# Patient Record
Sex: MALE | Race: BLACK OR AFRICAN AMERICAN | NOT HISPANIC OR LATINO | Employment: OTHER | URBAN - METROPOLITAN AREA
[De-identification: names, ages, dates, MRNs, and addresses within clinical notes are randomized per-mention and may not be internally consistent; named-entity substitution may affect disease eponyms.]

---

## 2017-08-22 ENCOUNTER — OFFICE VISIT (OUTPATIENT)
Dept: INTERNAL MEDICINE | Facility: CLINIC | Age: 42
End: 2017-08-22
Payer: COMMERCIAL

## 2017-08-22 ENCOUNTER — LAB VISIT (OUTPATIENT)
Dept: LAB | Facility: HOSPITAL | Age: 42
End: 2017-08-22
Attending: INTERNAL MEDICINE
Payer: COMMERCIAL

## 2017-08-22 VITALS
HEIGHT: 74 IN | WEIGHT: 216.06 LBS | HEART RATE: 60 BPM | RESPIRATION RATE: 16 BRPM | TEMPERATURE: 98 F | BODY MASS INDEX: 27.73 KG/M2 | DIASTOLIC BLOOD PRESSURE: 80 MMHG | SYSTOLIC BLOOD PRESSURE: 116 MMHG

## 2017-08-22 DIAGNOSIS — M54.6 ACUTE RIGHT-SIDED THORACIC BACK PAIN: ICD-10-CM

## 2017-08-22 LAB
ALBUMIN SERPL BCP-MCNC: 3.9 G/DL
ALP SERPL-CCNC: 89 U/L
ALT SERPL W/O P-5'-P-CCNC: 17 U/L
ANION GAP SERPL CALC-SCNC: 9 MMOL/L
AST SERPL-CCNC: 19 U/L
BASOPHILS # BLD AUTO: 0.02 K/UL
BASOPHILS NFR BLD: 0.4 %
BILIRUB SERPL-MCNC: 0.4 MG/DL
BUN SERPL-MCNC: 16 MG/DL
CALCIUM SERPL-MCNC: 9.7 MG/DL
CHLORIDE SERPL-SCNC: 107 MMOL/L
CHOLEST/HDLC SERPL: 3.3 {RATIO}
CK SERPL-CCNC: 172 U/L
CO2 SERPL-SCNC: 25 MMOL/L
CREAT SERPL-MCNC: 1.5 MG/DL
D DIMER PPP IA.FEU-MCNC: <0.19 MG/L FEU
DIFFERENTIAL METHOD: ABNORMAL
EOSINOPHIL # BLD AUTO: 0.1 K/UL
EOSINOPHIL NFR BLD: 1.6 %
ERYTHROCYTE [DISTWIDTH] IN BLOOD BY AUTOMATED COUNT: 13.8 %
EST. GFR  (AFRICAN AMERICAN): >60 ML/MIN/1.73 M^2
EST. GFR  (NON AFRICAN AMERICAN): 56.6 ML/MIN/1.73 M^2
GLUCOSE SERPL-MCNC: 84 MG/DL
HCT VFR BLD AUTO: 41.8 %
HDL/CHOLESTEROL RATIO: 30.3 %
HDLC SERPL-MCNC: 152 MG/DL
HDLC SERPL-MCNC: 46 MG/DL
HGB BLD-MCNC: 15.2 G/DL
LDLC SERPL CALC-MCNC: 94.8 MG/DL
LYMPHOCYTES # BLD AUTO: 2.1 K/UL
LYMPHOCYTES NFR BLD: 40.8 %
MCH RBC QN AUTO: 29.4 PG
MCHC RBC AUTO-ENTMCNC: 36.4 G/DL
MCV RBC AUTO: 81 FL
MONOCYTES # BLD AUTO: 0.5 K/UL
MONOCYTES NFR BLD: 9.4 %
NEUTROPHILS # BLD AUTO: 2.4 K/UL
NEUTROPHILS NFR BLD: 47.2 %
NONHDLC SERPL-MCNC: 106 MG/DL
PLATELET # BLD AUTO: 234 K/UL
PMV BLD AUTO: 9.5 FL
POTASSIUM SERPL-SCNC: 4.5 MMOL/L
PROT SERPL-MCNC: 8.1 G/DL
RBC # BLD AUTO: 5.17 M/UL
SODIUM SERPL-SCNC: 141 MMOL/L
TRIGL SERPL-MCNC: 56 MG/DL
TROPONIN I SERPL DL<=0.01 NG/ML-MCNC: <0.006 NG/ML
TSH SERPL DL<=0.005 MIU/L-ACNC: 1.28 UIU/ML
WBC # BLD AUTO: 5.02 K/UL

## 2017-08-22 PROCEDURE — 85025 COMPLETE CBC W/AUTO DIFF WBC: CPT

## 2017-08-22 PROCEDURE — 99204 OFFICE O/P NEW MOD 45 MIN: CPT | Mod: S$GLB,,, | Performed by: INTERNAL MEDICINE

## 2017-08-22 PROCEDURE — 36415 COLL VENOUS BLD VENIPUNCTURE: CPT | Mod: PO

## 2017-08-22 PROCEDURE — 80053 COMPREHEN METABOLIC PANEL: CPT

## 2017-08-22 PROCEDURE — 85379 FIBRIN DEGRADATION QUANT: CPT

## 2017-08-22 PROCEDURE — 82550 ASSAY OF CK (CPK): CPT

## 2017-08-22 PROCEDURE — 80061 LIPID PANEL: CPT

## 2017-08-22 PROCEDURE — 84443 ASSAY THYROID STIM HORMONE: CPT

## 2017-08-22 PROCEDURE — 3008F BODY MASS INDEX DOCD: CPT | Mod: S$GLB,,, | Performed by: INTERNAL MEDICINE

## 2017-08-22 PROCEDURE — 99999 PR PBB SHADOW E&M-EST. PATIENT-LVL III: CPT | Mod: PBBFAC,,, | Performed by: INTERNAL MEDICINE

## 2017-08-22 PROCEDURE — 84484 ASSAY OF TROPONIN QUANT: CPT

## 2017-08-22 RX ORDER — HYDROCODONE BITARTRATE AND ACETAMINOPHEN 7.5; 325 MG/1; MG/1
1 TABLET ORAL 3 TIMES DAILY
Refills: 0 | COMMUNITY
Start: 2017-07-26 | End: 2017-08-30 | Stop reason: SDUPTHER

## 2017-08-22 RX ORDER — TIZANIDINE 4 MG/1
4 TABLET ORAL NIGHTLY
Refills: 0 | COMMUNITY
Start: 2017-07-25 | End: 2017-11-10

## 2017-08-22 RX ORDER — IBUPROFEN 800 MG/1
800 TABLET ORAL 3 TIMES DAILY
Refills: 0 | COMMUNITY
Start: 2017-07-25 | End: 2017-08-30

## 2017-08-22 NOTE — PROGRESS NOTES
Subjective:       Patient ID: Hardik Jimenez is a 41 y.o. male.    Chief Complaint: Back Pain (on right side of back going toward neck.  pain at 2 now, was worse)  HISTORY OF PRESENT ILLNESS:  A 41-year-old black male patient comes in with pain   on his right middle back, which he said occasionally will radiate up to his   neck and head, lasting less than a half hour, not associated with any activity.    The patient has no history of injury.  He does not do manual work that would   account for this.  The patient has not really been evaluated in the past, other   than through the Emergency Room.  He is currently working at [employer] and getting   ready to move.    FAMILY HISTORY:  Mother with diabetes.  Father and brother are healthy.    SOCIAL HISTORY:  Occasionally smokes and drinks, but that is situational and   infrequent.  The patient has no history of other illness.  His asthma visits   were for minor problems.  The patient had lab on the day of the visit, showing   negative.  He had his blood work done, nonfasting, but his lipids are normal,   CBC is normal.  His creatinine is 1.5 and it has been around that in the past.    .  D-dimer is negative.  Troponin is negative.    PHYSICAL EXAMINATION:  VITAL SIGNS:  Normal.  SKIN:  Fair and healthy.  HEENT:  Clear.  NECK:  Shows no stiffness, adenopathy or thyroid enlargement.  CHEST:  Clear.  HEART:  There is no murmur or gallop.  ABDOMEN:  Nontender; without any organomegaly, mass, fullness.  GENITOURINARY:  Scrotal exam shows normal testicles.  No nodules.  No hernia.    No inguinal adenopathy.  BACK:  He seems to be tender in the right trapezius area, but only on very deep   pressure, not really with movement.  The rest of the exam is normal.    IMPRESSION:  Back pain, very likely due to tightness in his right trapezius.  I   told him to try magnesium one to two tablets a day to see if that will relieve   things.  I did not have his lab at that point, but  that supports the idea of   this.  If this continues, I will see him again soon.      JDC/HN  dd: 08/25/2017 15:18:31 (CDT)  td: 08/26/2017 04:19:01 (CDT)  Doc ID   #[id number]  Job ID #[id number]    CC:     [facility] [id number]  [facility]  Review of Systems   Constitutional: Negative for activity change, appetite change, fatigue and unexpected weight change.   HENT: Negative for dental problem, hearing loss, mouth sores, postnasal drip and sinus pressure.    Eyes: Negative for discharge and visual disturbance.   Respiratory: Negative for cough and shortness of breath.    Cardiovascular: Negative for chest pain and palpitations.   Gastrointestinal: Negative for abdominal pain, blood in stool, constipation, diarrhea and nausea.   Genitourinary: Negative for dysuria, hematuria and testicular pain.   Musculoskeletal: Positive for back pain and neck pain. Negative for arthralgias and joint swelling.   Skin: Negative for rash.   Neurological: Positive for headaches. Negative for weakness.   Psychiatric/Behavioral: Negative for agitation and sleep disturbance.       Objective:      Physical Exam   Constitutional: He is oriented to person, place, and time. He appears well-developed and well-nourished.   HENT:   Head: Normocephalic.   Right Ear: External ear normal.   Left Ear: External ear normal.   Mouth/Throat: Oropharynx is clear and moist.   Eyes: EOM are normal. Pupils are equal, round, and reactive to light. Right eye exhibits no discharge.   Neck: Normal range of motion. No JVD present. No tracheal deviation present. No thyromegaly present.   Cardiovascular: Normal rate, regular rhythm, normal heart sounds and intact distal pulses.  Exam reveals no gallop.    No murmur heard.  Pulmonary/Chest: Effort normal and breath sounds normal. He has no wheezes. He has no rales.   Abdominal: Soft. Bowel sounds are normal. He exhibits no mass. There is no tenderness.   Genitourinary: Prostate normal and penis normal. Rectal exam shows guaiac  negative stool.   Musculoskeletal: Normal range of motion. He exhibits no edema.   Lymphadenopathy:     He has no cervical adenopathy.   Neurological: He is oriented to person, place, and time. He displays normal reflexes. No cranial nerve deficit. Coordination normal.   Skin: Skin is warm. No rash noted. No pallor.   Psychiatric: He has a normal mood and affect.       Assessment:       1. Acute right-sided thoracic back pain        Plan:

## 2017-08-29 ENCOUNTER — TELEPHONE (OUTPATIENT)
Dept: INTERNAL MEDICINE | Facility: CLINIC | Age: 42
End: 2017-08-29

## 2017-08-29 NOTE — TELEPHONE ENCOUNTER
I tried to book him today w/ dr evangelista but he couldn't make it.  Scheduled appt with dr echevarria for tomorrow since dr evangelista till next Tuesday.

## 2017-08-29 NOTE — TELEPHONE ENCOUNTER
----- Message from Agustín Curtis MD sent at 8/26/2017 11:36 AM CDT -----  Lab is all normal, and how is he doing?

## 2017-08-29 NOTE — TELEPHONE ENCOUNTER
I told him your comments on results. He is on magnesium as you suggested.    Pain is still happening off and on.    Anything else can be done?    Please advise.  Thanks michelle

## 2017-08-30 ENCOUNTER — HOSPITAL ENCOUNTER (OUTPATIENT)
Dept: RADIOLOGY | Facility: HOSPITAL | Age: 42
Discharge: HOME | End: 2017-08-30
Attending: INTERNAL MEDICINE
Payer: COMMERCIAL

## 2017-08-30 ENCOUNTER — TELEPHONE (OUTPATIENT)
Dept: INTERNAL MEDICINE | Facility: CLINIC | Age: 42
End: 2017-08-30

## 2017-08-30 ENCOUNTER — OFFICE VISIT (OUTPATIENT)
Dept: INTERNAL MEDICINE | Facility: CLINIC | Age: 42
End: 2017-08-30
Payer: COMMERCIAL

## 2017-08-30 VITALS
HEIGHT: 73 IN | HEART RATE: 60 BPM | RESPIRATION RATE: 16 BRPM | WEIGHT: 218.69 LBS | SYSTOLIC BLOOD PRESSURE: 105 MMHG | DIASTOLIC BLOOD PRESSURE: 64 MMHG | BODY MASS INDEX: 28.98 KG/M2 | TEMPERATURE: 98 F

## 2017-08-30 DIAGNOSIS — M62.830 MUSCLE SPASM OF BACK: ICD-10-CM

## 2017-08-30 DIAGNOSIS — S23.9XXA THORACIC BACK SPRAIN, INITIAL ENCOUNTER: ICD-10-CM

## 2017-08-30 DIAGNOSIS — S23.9XXA THORACIC BACK SPRAIN, INITIAL ENCOUNTER: Primary | ICD-10-CM

## 2017-08-30 PROCEDURE — 99214 OFFICE O/P EST MOD 30 MIN: CPT | Mod: S$GLB,,, | Performed by: INTERNAL MEDICINE

## 2017-08-30 PROCEDURE — 3008F BODY MASS INDEX DOCD: CPT | Mod: S$GLB,,, | Performed by: INTERNAL MEDICINE

## 2017-08-30 PROCEDURE — 72070 X-RAY EXAM THORAC SPINE 2VWS: CPT | Mod: TC,PO

## 2017-08-30 PROCEDURE — 72070 X-RAY EXAM THORAC SPINE 2VWS: CPT | Mod: 26,,, | Performed by: RADIOLOGY

## 2017-08-30 PROCEDURE — 99999 PR PBB SHADOW E&M-EST. PATIENT-LVL III: CPT | Mod: PBBFAC,,, | Performed by: INTERNAL MEDICINE

## 2017-08-30 RX ORDER — MELOXICAM 15 MG/1
TABLET ORAL
Qty: 30 TABLET | Refills: 1 | Status: SHIPPED | OUTPATIENT
Start: 2017-08-30 | End: 2017-08-30 | Stop reason: SDUPTHER

## 2017-08-30 RX ORDER — MELOXICAM 15 MG/1
TABLET ORAL
Qty: 30 TABLET | Refills: 1 | Status: SHIPPED | OUTPATIENT
Start: 2017-08-30 | End: 2017-11-10

## 2017-08-30 RX ORDER — METHYLPREDNISOLONE 4 MG/1
TABLET ORAL
Qty: 1 PACKAGE | Refills: 0 | Status: SHIPPED | OUTPATIENT
Start: 2017-08-30 | End: 2017-11-10

## 2017-08-30 RX ORDER — METHYLPREDNISOLONE 4 MG/1
TABLET ORAL
Qty: 1 PACKAGE | Refills: 0 | Status: SHIPPED | OUTPATIENT
Start: 2017-08-30 | End: 2017-08-30 | Stop reason: SDUPTHER

## 2017-08-30 RX ORDER — HYDROCODONE BITARTRATE AND ACETAMINOPHEN 7.5; 325 MG/1; MG/1
1 TABLET ORAL EVERY 8 HOURS PRN
Qty: 20 TABLET | Refills: 0 | Status: SHIPPED | OUTPATIENT
Start: 2017-08-30 | End: 2017-11-10

## 2017-08-30 NOTE — TELEPHONE ENCOUNTER
----- Message from Kelvin Pablo DO sent at 8/30/2017  3:03 PM CDT -----  No acute findings on X-ray of the thoracic spine

## 2017-08-30 NOTE — PROGRESS NOTES
Subjective:       Patient ID: Hardik Jimenez is a 41 y.o. male.    Chief Complaint: Back Pain (mid)    HPI   Pt here for f/u regarding right sided thoracic back pain which has been intermittent over the last 2 weeks. It is described as a dull ache at rest and can be sharp with movement. Pt thinks he hurt it carrying luggage. Minimal relief with Advil or magnesium.   Review of Systems   Constitutional: Negative for activity change, appetite change, chills, diaphoresis, fatigue, fever and unexpected weight change.   HENT: Negative for postnasal drip, rhinorrhea, sinus pressure, sneezing, sore throat, trouble swallowing and voice change.    Respiratory: Negative for cough, shortness of breath and wheezing.    Cardiovascular: Negative for chest pain, palpitations and leg swelling.   Gastrointestinal: Negative for abdominal pain, blood in stool, constipation, diarrhea, nausea and vomiting.   Genitourinary: Negative for dysuria.   Musculoskeletal: Positive for back pain and myalgias. Negative for arthralgias.   Skin: Negative for rash and wound.   Allergic/Immunologic: Negative for environmental allergies and food allergies.   Hematological: Negative for adenopathy. Does not bruise/bleed easily.       Objective:      Physical Exam   Constitutional: He is oriented to person, place, and time. He appears well-developed and well-nourished. No distress.   HENT:   Head: Normocephalic and atraumatic.   Eyes: Conjunctivae and EOM are normal. Pupils are equal, round, and reactive to light. Right eye exhibits no discharge. Left eye exhibits no discharge. No scleral icterus.   Neck: Normal range of motion. Neck supple. No JVD present.   Cardiovascular: Normal rate, regular rhythm and normal heart sounds.    No murmur heard.  Pulmonary/Chest: Effort normal and breath sounds normal. No respiratory distress. He has no wheezes. He has no rales.   Musculoskeletal: He exhibits no edema.        Thoracic back: He exhibits tenderness and  pain.        Back:    Lymphadenopathy:     He has no cervical adenopathy.   Neurological: He is alert and oriented to person, place, and time.   Skin: Skin is warm and dry. No rash noted. He is not diaphoretic. No pallor.       Assessment:       1. Thoracic back sprain, initial encounter    2. Muscle spasm of back        Plan:    1/2. X-ray thoracic spine          Referral to PT          Rx Medrol pack and then start Mobic 15 mg daily          Refill Norco q8 PRN          Heat PRN

## 2017-11-10 ENCOUNTER — OFFICE VISIT (OUTPATIENT)
Dept: INTERNAL MEDICINE | Facility: CLINIC | Age: 42
End: 2017-11-10
Payer: COMMERCIAL

## 2017-11-10 VITALS
WEIGHT: 215.38 LBS | TEMPERATURE: 99 F | BODY MASS INDEX: 29.17 KG/M2 | SYSTOLIC BLOOD PRESSURE: 130 MMHG | DIASTOLIC BLOOD PRESSURE: 77 MMHG | RESPIRATION RATE: 16 BRPM | HEIGHT: 72 IN | HEART RATE: 96 BPM

## 2017-11-10 DIAGNOSIS — J32.9 VIRAL SINUSITIS: Primary | ICD-10-CM

## 2017-11-10 DIAGNOSIS — B97.89 VIRAL SINUSITIS: Primary | ICD-10-CM

## 2017-11-10 DIAGNOSIS — J20.9 ACUTE BRONCHITIS, UNSPECIFIED ORGANISM: ICD-10-CM

## 2017-11-10 PROCEDURE — 99214 OFFICE O/P EST MOD 30 MIN: CPT | Mod: 25,S$GLB,, | Performed by: INTERNAL MEDICINE

## 2017-11-10 PROCEDURE — 99999 PR PBB SHADOW E&M-EST. PATIENT-LVL III: CPT | Mod: PBBFAC,,, | Performed by: INTERNAL MEDICINE

## 2017-11-10 PROCEDURE — 96372 THER/PROPH/DIAG INJ SC/IM: CPT | Mod: S$GLB,,, | Performed by: INTERNAL MEDICINE

## 2017-11-10 RX ORDER — TRIAMCINOLONE ACETONIDE 40 MG/ML
40 INJECTION, SUSPENSION INTRA-ARTICULAR; INTRAMUSCULAR
Status: COMPLETED | OUTPATIENT
Start: 2017-11-10 | End: 2017-11-10

## 2017-11-10 RX ORDER — FLUTICASONE PROPIONATE 50 MCG
2 SPRAY, SUSPENSION (ML) NASAL DAILY
Qty: 16 G | Refills: 2 | Status: SHIPPED | OUTPATIENT
Start: 2017-11-10 | End: 2017-12-10

## 2017-11-10 RX ORDER — LEVOCETIRIZINE DIHYDROCHLORIDE 5 MG/1
5 TABLET, FILM COATED ORAL NIGHTLY
Qty: 30 TABLET | Refills: 3 | Status: ON HOLD | OUTPATIENT
Start: 2017-11-10 | End: 2018-02-27

## 2017-11-10 RX ADMIN — TRIAMCINOLONE ACETONIDE 40 MG: 40 INJECTION, SUSPENSION INTRA-ARTICULAR; INTRAMUSCULAR at 09:11

## 2017-11-10 NOTE — PROGRESS NOTES
Subjective:       Patient ID: Hardik Jimenez is a 42 y.o. male.    Chief Complaint: Fatigue    HPI   Pt here c/o 1 week of slowly improving sinus/chest congestion, slight dry cough, post nasal drip, fatigue, subjective fevers/chills, body aches. Some relief with Mucinex and Rx cough medication.   Review of Systems   Constitutional: Positive for fatigue. Negative for activity change, appetite change, chills, diaphoresis, fever and unexpected weight change.   HENT: Positive for congestion, postnasal drip, rhinorrhea and sinus pain. Negative for sinus pressure, sneezing, sore throat, trouble swallowing and voice change.    Respiratory: Positive for cough. Negative for shortness of breath and wheezing.    Cardiovascular: Negative for chest pain, palpitations and leg swelling.   Gastrointestinal: Negative for abdominal pain, blood in stool, constipation, diarrhea, nausea and vomiting.   Genitourinary: Negative for dysuria.   Musculoskeletal: Positive for arthralgias and myalgias.   Skin: Negative for rash and wound.   Allergic/Immunologic: Negative for environmental allergies and food allergies.   Hematological: Negative for adenopathy. Does not bruise/bleed easily.       Objective:      Physical Exam   Constitutional: He is oriented to person, place, and time. He appears well-developed and well-nourished. No distress.   HENT:   Head: Normocephalic and atraumatic.   Right Ear: External ear normal.   Left Ear: External ear normal.   Nose: Mucosal edema and rhinorrhea present.   Mouth/Throat: Oropharynx is clear and moist. No oropharyngeal exudate.   Eyes: Conjunctivae and EOM are normal. Pupils are equal, round, and reactive to light. Right eye exhibits no discharge. Left eye exhibits no discharge. No scleral icterus.   Neck: Normal range of motion. Neck supple. No JVD present.   Cardiovascular: Normal rate, regular rhythm and normal heart sounds.    No murmur heard.  Pulmonary/Chest: Effort normal and breath sounds  normal. No respiratory distress. He has no wheezes. He has no rales.   Abdominal: Soft. Bowel sounds are normal. There is no tenderness.   Musculoskeletal: He exhibits no edema.   Lymphadenopathy:     He has no cervical adenopathy.   Neurological: He is alert and oriented to person, place, and time.   Skin: Skin is warm and dry. No rash noted. He is not diaphoretic. No pallor.       Assessment:       1. Viral sinusitis    2. Acute bronchitis, unspecified organism        Plan:    1. Rx Xyzal/Flonase, Kenalog 40 mg IM   2. May use Mucinex DM

## 2018-02-25 ENCOUNTER — HOSPITAL ENCOUNTER (EMERGENCY)
Facility: OTHER | Age: 43
Discharge: HOME | End: 2018-02-25
Attending: EMERGENCY MEDICINE
Payer: COMMERCIAL

## 2018-02-25 VITALS
DIASTOLIC BLOOD PRESSURE: 80 MMHG | TEMPERATURE: 98 F | HEIGHT: 73 IN | RESPIRATION RATE: 18 BRPM | OXYGEN SATURATION: 100 % | HEART RATE: 72 BPM | BODY MASS INDEX: 34.46 KG/M2 | SYSTOLIC BLOOD PRESSURE: 117 MMHG | WEIGHT: 260 LBS

## 2018-02-25 DIAGNOSIS — K40.20 BILATERAL INGUINAL HERNIA WITHOUT OBSTRUCTION OR GANGRENE, RECURRENCE NOT SPECIFIED: ICD-10-CM

## 2018-02-25 DIAGNOSIS — N50.812 TESTICULAR PAIN, LEFT: ICD-10-CM

## 2018-02-25 DIAGNOSIS — R10.84 ABDOMINAL PAIN, GENERALIZED: Primary | ICD-10-CM

## 2018-02-25 LAB
ALBUMIN SERPL BCP-MCNC: 4.2 G/DL
ALP SERPL-CCNC: 73 U/L
ALT SERPL W/O P-5'-P-CCNC: 18 U/L
ANION GAP SERPL CALC-SCNC: 12 MMOL/L
AST SERPL-CCNC: 19 U/L
BASOPHILS # BLD AUTO: 0.02 K/UL
BASOPHILS NFR BLD: 0.3 %
BILIRUB SERPL-MCNC: 0.6 MG/DL
BILIRUB UR QL STRIP: NEGATIVE
BILIRUB UR QL STRIP: NEGATIVE
BUN SERPL-MCNC: 12 MG/DL
CALCIUM SERPL-MCNC: 9.4 MG/DL
CHLORIDE SERPL-SCNC: 107 MMOL/L
CLARITY UR: CLEAR
CLARITY UR: CLEAR
CO2 SERPL-SCNC: 21 MMOL/L
COLOR UR: YELLOW
COLOR UR: YELLOW
CREAT SERPL-MCNC: 1.2 MG/DL
DIFFERENTIAL METHOD: ABNORMAL
EOSINOPHIL # BLD AUTO: 0.1 K/UL
EOSINOPHIL NFR BLD: 1.1 %
ERYTHROCYTE [DISTWIDTH] IN BLOOD BY AUTOMATED COUNT: 13.7 %
EST. GFR  (AFRICAN AMERICAN): >60 ML/MIN/1.73 M^2
EST. GFR  (NON AFRICAN AMERICAN): >60 ML/MIN/1.73 M^2
GLUCOSE SERPL-MCNC: 127 MG/DL
GLUCOSE UR QL STRIP: NEGATIVE
GLUCOSE UR QL STRIP: NEGATIVE
HCT VFR BLD AUTO: 39.7 %
HGB BLD-MCNC: 14 G/DL
HGB UR QL STRIP: NEGATIVE
HGB UR QL STRIP: NEGATIVE
KETONES UR QL STRIP: NEGATIVE
KETONES UR QL STRIP: NEGATIVE
LEUKOCYTE ESTERASE UR QL STRIP: NEGATIVE
LEUKOCYTE ESTERASE UR QL STRIP: NEGATIVE
LIPASE SERPL-CCNC: 59 U/L
LYMPHOCYTES # BLD AUTO: 1.9 K/UL
LYMPHOCYTES NFR BLD: 24.8 %
MCH RBC QN AUTO: 29.4 PG
MCHC RBC AUTO-ENTMCNC: 35.3 G/DL
MCV RBC AUTO: 83 FL
MONOCYTES # BLD AUTO: 0.6 K/UL
MONOCYTES NFR BLD: 8.3 %
NEUTROPHILS # BLD AUTO: 4.9 K/UL
NEUTROPHILS NFR BLD: 65.2 %
NITRITE UR QL STRIP: NEGATIVE
NITRITE UR QL STRIP: NEGATIVE
PH UR STRIP: 7 [PH] (ref 5–8)
PH UR STRIP: 7 [PH] (ref 5–8)
PLATELET # BLD AUTO: 217 K/UL
PMV BLD AUTO: 9.2 FL
POTASSIUM SERPL-SCNC: 3.8 MMOL/L
PROT SERPL-MCNC: 7.6 G/DL
PROT UR QL STRIP: NEGATIVE
PROT UR QL STRIP: NEGATIVE
RBC # BLD AUTO: 4.76 M/UL
SODIUM SERPL-SCNC: 140 MMOL/L
SP GR UR STRIP: 1.02 (ref 1–1.03)
SP GR UR STRIP: 1.02 (ref 1–1.03)
URN SPEC COLLECT METH UR: NORMAL
URN SPEC COLLECT METH UR: NORMAL
UROBILINOGEN UR STRIP-ACNC: NEGATIVE EU/DL
UROBILINOGEN UR STRIP-ACNC: NEGATIVE EU/DL
WBC # BLD AUTO: 7.5 K/UL

## 2018-02-25 PROCEDURE — 96375 TX/PRO/DX INJ NEW DRUG ADDON: CPT

## 2018-02-25 PROCEDURE — 25500020 PHARM REV CODE 255: Performed by: EMERGENCY MEDICINE

## 2018-02-25 PROCEDURE — 99284 EMERGENCY DEPT VISIT MOD MDM: CPT | Mod: 25

## 2018-02-25 PROCEDURE — 85025 COMPLETE CBC W/AUTO DIFF WBC: CPT

## 2018-02-25 PROCEDURE — 63600175 PHARM REV CODE 636 W HCPCS: Performed by: EMERGENCY MEDICINE

## 2018-02-25 PROCEDURE — 96374 THER/PROPH/DIAG INJ IV PUSH: CPT

## 2018-02-25 PROCEDURE — 81003 URINALYSIS AUTO W/O SCOPE: CPT

## 2018-02-25 PROCEDURE — 87086 URINE CULTURE/COLONY COUNT: CPT

## 2018-02-25 PROCEDURE — 96376 TX/PRO/DX INJ SAME DRUG ADON: CPT

## 2018-02-25 PROCEDURE — 25000003 PHARM REV CODE 250: Performed by: EMERGENCY MEDICINE

## 2018-02-25 PROCEDURE — 83690 ASSAY OF LIPASE: CPT

## 2018-02-25 PROCEDURE — 80053 COMPREHEN METABOLIC PANEL: CPT

## 2018-02-25 PROCEDURE — 96361 HYDRATE IV INFUSION ADD-ON: CPT

## 2018-02-25 RX ORDER — KETOROLAC TROMETHAMINE 30 MG/ML
15 INJECTION, SOLUTION INTRAMUSCULAR; INTRAVENOUS
Status: COMPLETED | OUTPATIENT
Start: 2018-02-25 | End: 2018-02-25

## 2018-02-25 RX ORDER — HYDROMORPHONE HYDROCHLORIDE 1 MG/ML
1 INJECTION, SOLUTION INTRAMUSCULAR; INTRAVENOUS; SUBCUTANEOUS ONCE
Status: COMPLETED | OUTPATIENT
Start: 2018-02-25 | End: 2018-02-25

## 2018-02-25 RX ORDER — DICYCLOMINE HYDROCHLORIDE 20 MG/1
20 TABLET ORAL 2 TIMES DAILY
Qty: 20 TABLET | Refills: 0 | Status: SHIPPED | OUTPATIENT
Start: 2018-02-25 | End: 2018-03-08 | Stop reason: SDUPTHER

## 2018-02-25 RX ORDER — HYDROMORPHONE HYDROCHLORIDE 1 MG/ML
1 INJECTION, SOLUTION INTRAMUSCULAR; INTRAVENOUS; SUBCUTANEOUS
Status: DISCONTINUED | OUTPATIENT
Start: 2018-02-25 | End: 2018-02-25 | Stop reason: CLARIF

## 2018-02-25 RX ORDER — HYDROCODONE BITARTRATE AND ACETAMINOPHEN 5; 325 MG/1; MG/1
2 TABLET ORAL EVERY 6 HOURS PRN
Qty: 20 TABLET | Refills: 0 | Status: SHIPPED | OUTPATIENT
Start: 2018-02-25 | End: 2018-03-06

## 2018-02-25 RX ORDER — CIPROFLOXACIN 500 MG/1
500 TABLET ORAL 2 TIMES DAILY
Qty: 20 TABLET | Refills: 0 | Status: SHIPPED | OUTPATIENT
Start: 2018-02-25 | End: 2018-02-26 | Stop reason: ALTCHOICE

## 2018-02-25 RX ORDER — HYDROMORPHONE HYDROCHLORIDE 1 MG/ML
1 INJECTION, SOLUTION INTRAMUSCULAR; INTRAVENOUS; SUBCUTANEOUS
Status: DISCONTINUED | OUTPATIENT
Start: 2018-02-25 | End: 2018-02-25 | Stop reason: SDUPTHER

## 2018-02-25 RX ORDER — HYDROMORPHONE HYDROCHLORIDE 2 MG/ML
1 INJECTION, SOLUTION INTRAMUSCULAR; INTRAVENOUS; SUBCUTANEOUS
Status: COMPLETED | OUTPATIENT
Start: 2018-02-25 | End: 2018-02-25

## 2018-02-25 RX ORDER — HYDROMORPHONE HYDROCHLORIDE 1 MG/ML
1 INJECTION, SOLUTION INTRAMUSCULAR; INTRAVENOUS; SUBCUTANEOUS
Status: DISCONTINUED | OUTPATIENT
Start: 2018-02-25 | End: 2018-02-25 | Stop reason: RX

## 2018-02-25 RX ORDER — DICYCLOMINE HYDROCHLORIDE 10 MG/1
20 CAPSULE ORAL
Status: COMPLETED | OUTPATIENT
Start: 2018-02-25 | End: 2018-02-25

## 2018-02-25 RX ORDER — ONDANSETRON 2 MG/ML
8 INJECTION INTRAMUSCULAR; INTRAVENOUS
Status: COMPLETED | OUTPATIENT
Start: 2018-02-25 | End: 2018-02-25

## 2018-02-25 RX ADMIN — IOHEXOL 100 ML: 350 INJECTION, SOLUTION INTRAVENOUS at 06:02

## 2018-02-25 RX ADMIN — HYDROMORPHONE HYDROCHLORIDE 1 MG: 2 INJECTION, SOLUTION INTRAMUSCULAR; INTRAVENOUS; SUBCUTANEOUS at 02:02

## 2018-02-25 RX ADMIN — DICYCLOMINE HYDROCHLORIDE 20 MG: 10 CAPSULE ORAL at 03:02

## 2018-02-25 RX ADMIN — SODIUM CHLORIDE 1000 ML: 0.9 INJECTION, SOLUTION INTRAVENOUS at 02:02

## 2018-02-25 RX ADMIN — KETOROLAC TROMETHAMINE 15 MG: 30 INJECTION, SOLUTION INTRAMUSCULAR at 02:02

## 2018-02-25 RX ADMIN — ONDANSETRON 8 MG: 2 INJECTION, SOLUTION INTRAMUSCULAR; INTRAVENOUS at 02:02

## 2018-02-25 RX ADMIN — SODIUM CHLORIDE 1000 ML: 0.9 INJECTION, SOLUTION INTRAVENOUS at 03:02

## 2018-02-25 RX ADMIN — HYDROMORPHONE HYDROCHLORIDE 1 MG: 1 INJECTION, SOLUTION INTRAMUSCULAR; INTRAVENOUS; SUBCUTANEOUS at 05:02

## 2018-02-25 NOTE — ED NOTES
Pt rounding complete. Pt denies any needs at the moment. Pt does not appear to be in acute distress. Respirations are even and unlabored. VSS. Bed is locked and in lowest position with side rails up x2. Call light is within reach. Will continue to monitor.

## 2018-02-25 NOTE — ED PROVIDER NOTES
"Encounter Date: 2/25/2018    SCRIBE #1 NOTE: I, Jeanette Reynoso, am scribing for, and in the presence of, Dr. Lynch.       History     Chief Complaint   Patient presents with    Abdominal Pain     pt with abdominal pain and vomiting x 3 days.  pt states feels like kidney stones  that he ahs had in past     Time seen by provider: 2:12 PM    This is a 42 y.o. Male who presents with complaint of abdominal pain that began three days ago. The "burning" pain is located in the left flank. Symptoms feel similar to a prior bout of nephrolithiasis. He reports nausea, vomiting, and increased urinary frequency. He denies fever, chills, SOB, diarrhea, constipation, dysuria, decreased urine volume, or hematuria. He has no additional complaints.     Additional past medical, surgical, and social history as outlined in the nursing assessment was reviewed by me.          The history is provided by the patient. The history is limited by the condition of the patient.     Review of patient's allergies indicates:  No Known Allergies  Past Medical History:   Diagnosis Date    Asthma     Renal disorder     Kidney stones     History reviewed. No pertinent surgical history.  No family history on file.  Social History   Substance Use Topics    Smoking status: Never Smoker    Smokeless tobacco: Never Used    Alcohol use Yes      Comment: occasionally.     Review of Systems   Constitutional: Positive for chills. Negative for fever.   HENT: Negative for congestion, rhinorrhea and sore throat.    Respiratory: Negative for cough and shortness of breath.    Cardiovascular: Negative for chest pain.   Gastrointestinal: Positive for abdominal pain, nausea and vomiting. Negative for blood in stool, constipation and diarrhea.   Endocrine: Negative for polyuria.   Genitourinary: Positive for frequency and testicular pain. Negative for decreased urine volume, dysuria and hematuria.   Musculoskeletal: Positive for back pain.   Skin: Negative for " rash.   Allergic/Immunologic: Negative for immunocompromised state.   Neurological: Negative for dizziness and weakness.   Hematological: Does not bruise/bleed easily.   Psychiatric/Behavioral: Negative for confusion.       Physical Exam     Initial Vitals [02/25/18 1350]   BP Pulse Resp Temp SpO2   136/82 (!) 57 20 97.5 °F (36.4 °C) 100 %      MAP       100         Physical Exam    Nursing note and vitals reviewed.  Constitutional: He appears well-developed and well-nourished. He is not diaphoretic. He appears distressed (due to pain).   HENT:   Head: Normocephalic and atraumatic.   Right Ear: External ear normal.   Left Ear: External ear normal.   Eyes: Conjunctivae and EOM are normal. Right eye exhibits no discharge. Left eye exhibits no discharge.   Neck: Normal range of motion. Neck supple. No tracheal deviation present.   Cardiovascular: Normal rate, regular rhythm, normal heart sounds and intact distal pulses. Exam reveals no gallop and no friction rub.    No murmur heard.  Pulmonary/Chest: Breath sounds normal. No stridor. No respiratory distress. He has no wheezes. He has no rhonchi. He has no rales.   Abdominal: Soft. Bowel sounds are normal. He exhibits no distension. There is no tenderness. There is no rebound and no guarding. Hernia confirmed negative in the right inguinal area and confirmed negative in the left inguinal area.   Genitourinary: Right testis shows no mass and no tenderness. Left testis shows no mass and no tenderness. Uncircumcised.   Musculoskeletal: Normal range of motion. He exhibits no edema or tenderness.   Neurological: He is alert and oriented to person, place, and time. He has normal strength. No cranial nerve deficit.   Skin: Skin is warm and dry. Capillary refill takes less than 2 seconds. No erythema. No pallor.   Psychiatric: He has a normal mood and affect. His behavior is normal. Thought content normal.         ED Course   Procedures  Labs Reviewed   CBC W/ AUTO DIFFERENTIAL  - Abnormal; Notable for the following:        Result Value    Hematocrit 39.7 (*)     All other components within normal limits   COMPREHENSIVE METABOLIC PANEL - Abnormal; Notable for the following:     CO2 21 (*)     Glucose 127 (*)     All other components within normal limits   CULTURE, URINE   URINALYSIS   LIPASE   URINALYSIS     Imaging Results          CT Abdomen Pelvis With Contrast (Final result)  Result time 02/25/18 18:30:09    Final result by Taran Dyson MD (02/25/18 18:30:09)                 Impression:      1.  No acute CT abnormality to correlate with left lower quadrant pain, no diverticulitis as clinically questioned.    2.  Mild fatty proliferation of distal small bowel, nonspecific, could reflect sequela of previous infection or inflammation, no active inflammation at this time.    3.  Several additional findings above, noting left nonobstructive nephrolithiasis.      Electronically signed by: TARAN DYSON MD  Date:     02/25/18  Time:    18:30              Narrative:    CT abdomen and pelvis with contrast    Clinical history: Left lower quadrant pain and suspected diverticulitis    Comparison: 10/13/2016    Technique:  Axial images of the abdomen and pelvis were obtained at 5 mm intervals following administration of 100 cc Omni 350 IV contrast.  Coronal and sagittal and delayed images were reviewed.    Findings:  Images of the lower thorax are remarkable for mild dependent atelectasis.    The liver, spleen, pancreas, gallbladder and adrenal glands are grossly unremarkable.  There is no biliary dilation or ascites.  Scattered shotty periaortic and paracaval lymph nodes are noted.  The portal vein, splenic vein, SMV, celiac axis and SMA all are patent.    There is left nonobstructing nephrolithiasis.  The kidneys enhance symmetrically without right nephrolithiasis or hydronephrosis.  The left ureter is unremarkable without calculi seen, the right ureter is unremarkable without calculi seen.   The urinary bladder is unremarkable.  The kidneys excrete contrast appropriately on delayed imaging.  The prostate is prominent.    There are a few scattered colonic diverticula, no convincing inflammation to suggest diverticulitis.  The appendix and terminal ileum are unremarkable.  The small bowel is grossly unremarkable.  No bulky pelvic lymphadenopathy.  No significant free fluid in the pelvis.    No focal osseous destructive process.    No significant inguinal lymphadenopathy.  There are bilateral mesenteric containing inguinal hernias.                             US Scrotum And Testicles (Final result)  Result time 02/25/18 17:02:00    Final result by Eh Quesada MD (02/25/18 17:02:00)                 Impression:        No etiology for left testicular pain identified, noting patient uncooperative/ could not lie still, which limits evaluation. Indeterminate 3 mm hyperechoic focus in the right testicle, possible focal fibrosis. Short term imaging followup could be performed to ensure stability, if indicated.        Electronically signed by: Eh Quesada MD  Date:     02/25/18  Time:    17:01              Narrative:    Comparison: None.    Results: Scrotal ultrasound performed.  The testicles are symmetric in echogenicity and size.  The right testicle measures 3.6 x 2.7 x 4.7 cm.  The left testicle measures 3.4 x 3.1 x 4.7 cm.  No testicular masses.  Small 3 mm hyperechoic area. The epididymis are difficult to visualize, noting patient uncooperative/could not lie still. No hydrocele or varicocele. Bilateral arterial and venous flow demonstrated to both testicles.                                    Medical Decision Making:   Initial Assessment:   Patient with severe abdominal/flank pain. H/o nephrolithiasis. He was severe in distress on arrival; pain improved to 7/10 after Dilaudid/Toradol. He then was able to localize discomfort to LLQ/left lumbar region of the abdomen. He is a poor historian and additionally  endorses intermittent testicular discomfort. He has no evidence of hernia. I will start with an US of the testicle to rule out torsion though my suspicion for this is relatively low. He will likely need a CT of his abdomen. I will determine the need for IV contrast based on his UA. I will give IVF. I will reassess.   Clinical Tests:   Lab Tests: Ordered and Reviewed  Radiological Study: Ordered and Reviewed  ED Management:  5:54 PM - Patient remains uncomfortable, only finding relief with Dilaudid. UA is normal. US showed flow in both testicles. I doubt torsion/detorsion. I will CT his abdomen with IV contrast at this time.     6:43 PM - Patient is on his phone sitting comfortably in the bed and noting marked improvement after the second dose of Dilaudid. CT shows no obstructing nephrolithiasis, mesenteric containing inguinal hernia bilaterally, and inflammatory changes in the small bowel. I will give patient antibiotics in addition to pain medication. I have advised he see his PCP this week for re-evaluation and consider evaluation by GenSurg as needed. He wants to go home. I have discussed with patient the diagnostic results, diagnosis, treatment plan, and need for follow-up. Patient has expressed understanding of my instructions. I am comfortable with his discharge home at this time.              Scribe Attestation:   Scribe #1: I performed the above scribed service and the documentation accurately describes the services I performed. I attest to the accuracy of the note.    Attending Attestation:           Physician Attestation for Scribe:  Physician Attestation Statement for Scribe #1: I, Dr. Lynch, reviewed documentation, as scribed by Jeanette Reynoso in my presence, and it is both accurate and complete.                    Clinical Impression:     1. Abdominal pain, generalized    2. Testicular pain, left    3. Bilateral inguinal hernia without obstruction or gangrene, recurrence not specified                                Rubi Lynch MD  02/25/18 [id number]

## 2018-02-25 NOTE — ED NOTES
Pt rounding complete.Pt does appear to be more comfortable. Pt reports pain now 6/10.  Pt updated on POC. Pt verbalized understanding. Pt denies any needs at the moment. Pt does not appear to be in acute distress. Respirations are even and unlabored. VSS. Bed is locked and in lowest position with side rails up x2. Call light is within reach. Will continue to monitor.

## 2018-02-25 NOTE — ED NOTES
Pt rounding complete. Pt denies needs at the moment. Pt does not appear to be in acute distress. Respirations are even and unlabored. VSS. Bed is locked and in lowest position with side rails up x2. Call light is within reach. Will continue to monitor.

## 2018-02-25 NOTE — ED NOTES
Pt presents to the ed with c/o right lower abdominal pain intermittent for a few weeks. Pt reports pain became unbearable this morning. Pt reports n/v. Pt denies any sob or chest pain. Pt appears to be in distress. Pt is diaphoretic and moaning. Pt is placed on cont cardiac, bp and pulse ox monitoring. Bed is locked and in lowest position with side rails up x2. Call light is within reach.

## 2018-02-26 ENCOUNTER — HOSPITAL ENCOUNTER (INPATIENT)
Facility: OTHER | Age: 43
LOS: 1 days | Discharge: HOME | DRG: 392 | End: 2018-02-28
Attending: EMERGENCY MEDICINE | Admitting: EMERGENCY MEDICINE
Payer: COMMERCIAL

## 2018-02-26 DIAGNOSIS — R11.2 NON-INTRACTABLE VOMITING WITH NAUSEA, UNSPECIFIED VOMITING TYPE: Primary | ICD-10-CM

## 2018-02-26 DIAGNOSIS — R10.32 LEFT LOWER QUADRANT PAIN: ICD-10-CM

## 2018-02-26 DIAGNOSIS — E86.0 DEHYDRATION, MILD: ICD-10-CM

## 2018-02-26 DIAGNOSIS — R10.9 ABDOMINAL PAIN: ICD-10-CM

## 2018-02-26 DIAGNOSIS — N20.0 LEFT NEPHROLITHIASIS: ICD-10-CM

## 2018-02-26 PROBLEM — N23 RENAL COLIC ON LEFT SIDE: Status: ACTIVE | Noted: 2018-02-26

## 2018-02-26 LAB
ALBUMIN SERPL BCP-MCNC: 3.8 G/DL
ALP SERPL-CCNC: 68 U/L
ALT SERPL W/O P-5'-P-CCNC: 14 U/L
AMPHET+METHAMPHET UR QL: NEGATIVE
ANION GAP SERPL CALC-SCNC: 11 MMOL/L
AST SERPL-CCNC: 19 U/L
BARBITURATES UR QL SCN>200 NG/ML: NEGATIVE
BASOPHILS # BLD AUTO: 0.01 K/UL
BASOPHILS NFR BLD: 0.1 %
BENZODIAZ UR QL SCN>200 NG/ML: NEGATIVE
BILIRUB SERPL-MCNC: 0.5 MG/DL
BILIRUB UR QL STRIP: NEGATIVE
BUN SERPL-MCNC: 11 MG/DL
BZE UR QL SCN: NEGATIVE
CALCIUM SERPL-MCNC: 9 MG/DL
CANNABINOIDS UR QL SCN: NORMAL
CHLORIDE SERPL-SCNC: 107 MMOL/L
CLARITY UR: CLEAR
CO2 SERPL-SCNC: 21 MMOL/L
COLOR UR: YELLOW
CREAT SERPL-MCNC: 1.2 MG/DL
CREAT UR-MCNC: 184.1 MG/DL
DIFFERENTIAL METHOD: ABNORMAL
EOSINOPHIL # BLD AUTO: 0.1 K/UL
EOSINOPHIL NFR BLD: 0.6 %
ERYTHROCYTE [DISTWIDTH] IN BLOOD BY AUTOMATED COUNT: 13.8 %
EST. GFR  (AFRICAN AMERICAN): >60 ML/MIN/1.73 M^2
EST. GFR  (NON AFRICAN AMERICAN): >60 ML/MIN/1.73 M^2
ETHANOL UR-MCNC: <10 MG/DL
GLUCOSE SERPL-MCNC: 119 MG/DL
GLUCOSE UR QL STRIP: NEGATIVE
HCT VFR BLD AUTO: 36.7 %
HGB BLD-MCNC: 13 G/DL
HGB UR QL STRIP: NEGATIVE
KETONES UR QL STRIP: ABNORMAL
LEUKOCYTE ESTERASE UR QL STRIP: NEGATIVE
LIPASE SERPL-CCNC: 11 U/L
LYMPHOCYTES # BLD AUTO: 1.6 K/UL
LYMPHOCYTES NFR BLD: 18.5 %
MCH RBC QN AUTO: 29.5 PG
MCHC RBC AUTO-ENTMCNC: 35.4 G/DL
MCV RBC AUTO: 83 FL
METHADONE UR QL SCN>300 NG/ML: NEGATIVE
MONOCYTES # BLD AUTO: 0.6 K/UL
MONOCYTES NFR BLD: 6.6 %
NEUTROPHILS # BLD AUTO: 6.5 K/UL
NEUTROPHILS NFR BLD: 73.7 %
NITRITE UR QL STRIP: NEGATIVE
OPIATES UR QL SCN: NORMAL
PCP UR QL SCN>25 NG/ML: NEGATIVE
PH UR STRIP: 7 [PH] (ref 5–8)
PLATELET # BLD AUTO: 194 K/UL
PMV BLD AUTO: 9.1 FL
POTASSIUM SERPL-SCNC: 3.8 MMOL/L
PROT SERPL-MCNC: 6.9 G/DL
PROT UR QL STRIP: NEGATIVE
RBC # BLD AUTO: 4.4 M/UL
SODIUM SERPL-SCNC: 139 MMOL/L
SP GR UR STRIP: 1.01 (ref 1–1.03)
TOXICOLOGY INFORMATION: NORMAL
URN SPEC COLLECT METH UR: ABNORMAL
UROBILINOGEN UR STRIP-ACNC: NEGATIVE EU/DL
WBC # BLD AUTO: 8.8 K/UL

## 2018-02-26 PROCEDURE — 99223 1ST HOSP IP/OBS HIGH 75: CPT | Mod: ,,, | Performed by: HOSPITALIST

## 2018-02-26 PROCEDURE — 99285 EMERGENCY DEPT VISIT HI MDM: CPT | Mod: 25

## 2018-02-26 PROCEDURE — 96376 TX/PRO/DX INJ SAME DRUG ADON: CPT

## 2018-02-26 PROCEDURE — 63600175 PHARM REV CODE 636 W HCPCS: Performed by: EMERGENCY MEDICINE

## 2018-02-26 PROCEDURE — 93010 ELECTROCARDIOGRAM REPORT: CPT | Mod: ,,, | Performed by: INTERNAL MEDICINE

## 2018-02-26 PROCEDURE — G0378 HOSPITAL OBSERVATION PER HR: HCPCS

## 2018-02-26 PROCEDURE — 96361 HYDRATE IV INFUSION ADD-ON: CPT

## 2018-02-26 PROCEDURE — 96374 THER/PROPH/DIAG INJ IV PUSH: CPT

## 2018-02-26 PROCEDURE — 80053 COMPREHEN METABOLIC PANEL: CPT

## 2018-02-26 PROCEDURE — 85025 COMPLETE CBC W/AUTO DIFF WBC: CPT

## 2018-02-26 PROCEDURE — 83690 ASSAY OF LIPASE: CPT

## 2018-02-26 PROCEDURE — 81003 URINALYSIS AUTO W/O SCOPE: CPT

## 2018-02-26 PROCEDURE — 93005 ELECTROCARDIOGRAM TRACING: CPT

## 2018-02-26 PROCEDURE — 25000003 PHARM REV CODE 250: Performed by: EMERGENCY MEDICINE

## 2018-02-26 PROCEDURE — 80307 DRUG TEST PRSMV CHEM ANLYZR: CPT

## 2018-02-26 PROCEDURE — 25500020 PHARM REV CODE 255: Performed by: EMERGENCY MEDICINE

## 2018-02-26 PROCEDURE — 96372 THER/PROPH/DIAG INJ SC/IM: CPT | Mod: 59

## 2018-02-26 PROCEDURE — 63600175 PHARM REV CODE 636 W HCPCS: Performed by: HOSPITALIST

## 2018-02-26 PROCEDURE — 96375 TX/PRO/DX INJ NEW DRUG ADDON: CPT

## 2018-02-26 RX ORDER — HYDROMORPHONE HYDROCHLORIDE 1 MG/ML
1 INJECTION, SOLUTION INTRAMUSCULAR; INTRAVENOUS; SUBCUTANEOUS
Status: ACTIVE | OUTPATIENT
Start: 2018-02-26 | End: 2018-02-26

## 2018-02-26 RX ORDER — ONDANSETRON 2 MG/ML
4 INJECTION INTRAMUSCULAR; INTRAVENOUS EVERY 6 HOURS PRN
Status: DISCONTINUED | OUTPATIENT
Start: 2018-02-26 | End: 2018-02-28 | Stop reason: HOSPADM

## 2018-02-26 RX ORDER — ZOLPIDEM TARTRATE 5 MG/1
5 TABLET ORAL NIGHTLY PRN
Status: DISCONTINUED | OUTPATIENT
Start: 2018-02-26 | End: 2018-02-28 | Stop reason: HOSPADM

## 2018-02-26 RX ORDER — FENTANYL CITRATE 50 UG/ML
75 INJECTION, SOLUTION INTRAMUSCULAR; INTRAVENOUS
Status: COMPLETED | OUTPATIENT
Start: 2018-02-26 | End: 2018-02-26

## 2018-02-26 RX ORDER — DICYCLOMINE HYDROCHLORIDE 10 MG/ML
20 INJECTION INTRAMUSCULAR
Status: COMPLETED | OUTPATIENT
Start: 2018-02-26 | End: 2018-02-26

## 2018-02-26 RX ORDER — ONDANSETRON 2 MG/ML
4 INJECTION INTRAMUSCULAR; INTRAVENOUS
Status: COMPLETED | OUTPATIENT
Start: 2018-02-26 | End: 2018-02-26

## 2018-02-26 RX ORDER — SODIUM CHLORIDE 9 MG/ML
INJECTION, SOLUTION INTRAVENOUS CONTINUOUS
Status: DISCONTINUED | OUTPATIENT
Start: 2018-02-26 | End: 2018-02-28 | Stop reason: HOSPADM

## 2018-02-26 RX ORDER — MEPERIDINE HYDROCHLORIDE 50 MG/ML
25 INJECTION INTRAMUSCULAR; INTRAVENOUS; SUBCUTANEOUS
Status: COMPLETED | OUTPATIENT
Start: 2018-02-26 | End: 2018-02-26

## 2018-02-26 RX ORDER — CIPROFLOXACIN 500 MG/1
500 TABLET ORAL
Status: ON HOLD | COMMUNITY
End: 2018-02-28 | Stop reason: HOSPADM

## 2018-02-26 RX ORDER — ALBUTEROL SULFATE 0.83 MG/ML
1.25 SOLUTION RESPIRATORY (INHALATION) EVERY 6 HOURS PRN
Status: DISCONTINUED | OUTPATIENT
Start: 2018-02-26 | End: 2018-02-28 | Stop reason: HOSPADM

## 2018-02-26 RX ORDER — HYDROMORPHONE HYDROCHLORIDE 1 MG/ML
1 INJECTION, SOLUTION INTRAMUSCULAR; INTRAVENOUS; SUBCUTANEOUS EVERY 4 HOURS PRN
Status: DISCONTINUED | OUTPATIENT
Start: 2018-02-26 | End: 2018-02-28 | Stop reason: HOSPADM

## 2018-02-26 RX ORDER — FENTANYL CITRATE 50 UG/ML
50 INJECTION, SOLUTION INTRAMUSCULAR; INTRAVENOUS
Status: COMPLETED | OUTPATIENT
Start: 2018-02-26 | End: 2018-02-26

## 2018-02-26 RX ORDER — KETOROLAC TROMETHAMINE 30 MG/ML
15 INJECTION, SOLUTION INTRAMUSCULAR; INTRAVENOUS
Status: COMPLETED | OUTPATIENT
Start: 2018-02-26 | End: 2018-02-26

## 2018-02-26 RX ORDER — FENTANYL CITRATE 50 UG/ML
100 INJECTION, SOLUTION INTRAMUSCULAR; INTRAVENOUS
Status: DISCONTINUED | OUTPATIENT
Start: 2018-02-26 | End: 2018-02-26

## 2018-02-26 RX ORDER — HEPARIN SODIUM 5000 [USP'U]/ML
5000 INJECTION, SOLUTION INTRAVENOUS; SUBCUTANEOUS EVERY 8 HOURS
Status: DISCONTINUED | OUTPATIENT
Start: 2018-02-26 | End: 2018-02-28 | Stop reason: HOSPADM

## 2018-02-26 RX ORDER — SODIUM CHLORIDE 9 MG/ML
INJECTION, SOLUTION INTRAVENOUS CONTINUOUS
Status: ACTIVE | OUTPATIENT
Start: 2018-02-26 | End: 2018-02-27

## 2018-02-26 RX ADMIN — HYDROMORPHONE HYDROCHLORIDE 1 MG: 1 INJECTION, SOLUTION INTRAMUSCULAR; INTRAVENOUS; SUBCUTANEOUS at 11:02

## 2018-02-26 RX ADMIN — DICYCLOMINE HYDROCHLORIDE 20 MG: 10 INJECTION INTRAMUSCULAR at 11:02

## 2018-02-26 RX ADMIN — SODIUM CHLORIDE 1000 ML: 0.9 INJECTION, SOLUTION INTRAVENOUS at 10:02

## 2018-02-26 RX ADMIN — FENTANYL CITRATE 75 MCG: 50 INJECTION, SOLUTION INTRAMUSCULAR; INTRAVENOUS at 12:02

## 2018-02-26 RX ADMIN — KETOROLAC TROMETHAMINE 15 MG: 30 INJECTION, SOLUTION INTRAMUSCULAR at 10:02

## 2018-02-26 RX ADMIN — HEPARIN SODIUM 5000 UNITS: 5000 INJECTION, SOLUTION INTRAVENOUS; SUBCUTANEOUS at 09:02

## 2018-02-26 RX ADMIN — IOHEXOL 100 ML: 350 INJECTION, SOLUTION INTRAVENOUS at 03:02

## 2018-02-26 RX ADMIN — IOHEXOL 15 ML: 350 INJECTION, SOLUTION INTRAVENOUS at 03:02

## 2018-02-26 RX ADMIN — SODIUM CHLORIDE: 0.9 INJECTION, SOLUTION INTRAVENOUS at 08:02

## 2018-02-26 RX ADMIN — ONDANSETRON 4 MG: 2 INJECTION, SOLUTION INTRAMUSCULAR; INTRAVENOUS at 02:02

## 2018-02-26 RX ADMIN — ONDANSETRON 4 MG: 2 INJECTION, SOLUTION INTRAMUSCULAR; INTRAVENOUS at 10:02

## 2018-02-26 RX ADMIN — MEPERIDINE HYDROCHLORIDE 25 MG: 50 INJECTION INTRAMUSCULAR; INTRAVENOUS; SUBCUTANEOUS at 02:02

## 2018-02-26 RX ADMIN — FENTANYL CITRATE 50 MCG: 50 INJECTION, SOLUTION INTRAMUSCULAR; INTRAVENOUS at 10:02

## 2018-02-26 RX ADMIN — HYDROMORPHONE HYDROCHLORIDE 1 MG: 1 INJECTION, SOLUTION INTRAMUSCULAR; INTRAVENOUS; SUBCUTANEOUS at 07:02

## 2018-02-26 NOTE — ED PROVIDER NOTES
Encounter Date: 2/26/2018    SCRIBE #1 NOTE: I, Luis Contreras, am scribing for, and in the presence of, Dr. Hui.       History     Chief Complaint   Patient presents with    Abdominal Pain     continued abd pain from less than 24 hrs ago. reports unable to get nausea medication due to pharmacy being closed. took pain medication with no relief.     9:39 AM    Patient is a 42 y.o. male who presents to the ED with complaint of abdominal pain which began four days ago. Patient was seen here yesterday and had an extensive work up with no acute findings. Patient reports pain is located in the left side of his abdomen and occasionally radiated to the left flank region. He reports associated nausea and vomiting. He denies fever, and reports no diarrhea, constipation, or urinary symptoms. He reports experiencing similar pain with past kidney stones. He reports no past abdominal surgeries. He has not contacted his PCP regarding his current symptoms. He has no additional complaints.      The history is provided by the patient.     Review of patient's allergies indicates:  No Known Allergies  Past Medical History:   Diagnosis Date    Asthma     Renal disorder     Kidney stones     History reviewed. No pertinent surgical history.  History reviewed. No pertinent family history.  Social History   Substance Use Topics    Smoking status: Never Smoker    Smokeless tobacco: Never Used    Alcohol use Yes      Comment: occasionally.     Review of Systems   Constitutional: Negative for chills and fever.   HENT: Negative for congestion.    Respiratory: Negative for shortness of breath.    Cardiovascular: Negative for chest pain.   Gastrointestinal: Positive for abdominal pain (left sided) and vomiting. Negative for constipation and diarrhea.   Genitourinary: Positive for flank pain (left sided). Negative for difficulty urinating, dysuria and urgency.   Musculoskeletal: Negative for gait problem and joint swelling.   Skin:  Negative for rash.   Neurological: Negative for weakness and headaches.   Psychiatric/Behavioral: Negative for confusion.       Physical Exam     Initial Vitals [02/26/18 0928]   BP Pulse Resp Temp SpO2   (!) 136/97 60 18 97.6 °F (36.4 °C) 99 %      MAP       110         Physical Exam    Nursing note and vitals reviewed.  Constitutional: He appears well-developed and well-nourished. No distress.   Patient appears uncomfortable.   HENT:   Head: Normocephalic and atraumatic.   Right Ear: External ear normal.   Left Ear: External ear normal.   Nose: Nose normal.   Mouth/Throat: Oropharynx is clear and moist.   Eyes: Conjunctivae and EOM are normal. Pupils are equal, round, and reactive to light.   Neck: Normal range of motion. Neck supple.   Cardiovascular: Normal rate, regular rhythm, normal heart sounds and intact distal pulses. Exam reveals no gallop and no friction rub.    No murmur heard.  Femoral and distal pulses are 2+.   Pulmonary/Chest: Breath sounds normal. No respiratory distress. He has no wheezes. He has no rhonchi. He has no rales.   Abdominal: Soft. Bowel sounds are normal. There is no tenderness. There is no rebound and no guarding.   No palpable hernia.   Genitourinary:   Genitourinary Comments: No tenderness to his scrotum.    Musculoskeletal: Normal range of motion.   Neurological: He is alert and oriented to person, place, and time. He has normal strength. No cranial nerve deficit or sensory deficit.   Normal strength and sensation.   Skin: Skin is warm and dry.   Psychiatric: He has a normal mood and affect. His behavior is normal. Judgment and thought content normal.         ED Course   Procedures  Labs Reviewed   CBC W/ AUTO DIFFERENTIAL - Abnormal; Notable for the following:        Result Value    RBC 4.40 (*)     Hemoglobin 13.0 (*)     Hematocrit 36.7 (*)     MPV 9.1 (*)     Gran% 73.7 (*)     All other components within normal limits   COMPREHENSIVE METABOLIC PANEL - Abnormal; Notable for  the following:     CO2 21 (*)     Glucose 119 (*)     All other components within normal limits   URINALYSIS - Abnormal; Notable for the following:     Ketones, UA Trace (*)     All other components within normal limits   LIPASE   TOXICOLOGY SCREEN, URINE, RANDOM (COMPLIANCE)     Imaging Results          CT Abdomen Pelvis  Without Contrast (Final result)  Result time 02/26/18 10:51:55    Final result by Rosa Arevalo MD (02/26/18 10:51:55)                 Impression:       No acute abnormality. Nonobstructing left renal stones.      Electronically signed by: ROSA AREVALO  Date:     02/26/18  Time:    10:51              Narrative:    HISTORY:  left flank pain     TECHNIQUE: Axial CT images acquired from the diaphragm through the ischial tuberosities without contrast.    Multiplanar reconstructions provided for review.    COMPARISON: CT of the abdomen and pelvis one day prior     FINDINGS:    Inferior Mediastinum/Heart: Normal.    Lung Bases: No nodules/consolidation.    Peritoneal Space: No ascites. No free air.    Liver: No focal mass.      Gallbladder: No calcified gallstones. Excretion of contrast is present within the gallbladder lumen.    Bile Ducts: No evidence of dilated ducts.    Pancreas: No mass or peripancreatic fat stranding.     Spleen: Normal.    Adrenals: Unremarkable.    Bowel/Mesentery: No evidence of bowel obstruction or inflammation.  Appendix is normal.    Urinary Tract: No evidence of obstructive uropathy. 2 nonobstructing stones measuring 3-5 mm are present in the left kidney. Small amount of contrast excretion is present in the ureters and bladder.    Retroperitoneum:  No significant adenopathy.     Pelvis:  No pelvic masses, adenopathy, or free fluid.     Abdominal wall:  Normal.     Vasculature: No aneurysm.      Bones: No acute fracture or bony destructive process.                              EKG Readings: (Independently Interpreted)   Sinus rhythm. Rate of 63. No acute ischemic changes.            Medical Decision Making:   History:   Old Medical Records: I decided to obtain old medical records.       <> Summary of Records: Patient has an extensive work up for abdominal pain yesterday. CT abdomen with IV contrast showed no acute findings. Ultrasound of testicles showed no acute findings. Labs were unremarkable including urine, CMP, CBC, and lipase.  Initial Assessment:   Patient with recurrent abdominal pain. Seems to be located on the left side. It is possible he is having a kidney stone on the left. Will repeat evaluation. In reviewing his records he has had many evaluations for abdominal pain without finding a diagnosis.  Independently Interpreted Test(s):   I have ordered and independently interpreted EKG Reading(s) - see prior notes  Clinical Tests:   Lab Tests: Ordered and Reviewed  Radiological Study: Ordered and Reviewed  Medical Tests: Ordered and Reviewed  ED Management:  11:25 AM - On repeat evaluation he now relates that this pain has been going on and off for several weeks. On repeat examination, patient appears uncomfortable but has a benign exam. Awaiting urine.    12:45 PM - I reevaluated the patient. Continues to be benign. I repeated genital exam. He has no incarcerated hernias, swelling, or tenderness. Patient with left-sided pelvic pain. Unclear etiology.    1:25 PM - I asked my partner and colleague Dr. Zaman to evaluate the patient since I am unable to find the cause of his pain. We discussed the case and considered doing a CT of his abdomen and pelvis with IV and oral contrast. This was ordered.    1:53 PM - Attempted to contact the patient's PCP without success. I discussed the case with Dr. Stoddard. Will place in observation, CT abdomen/pelvis with IV and oral contrast. May consider GI consult for pelvic pain of unclear etiology.            Scribe Attestation:   Scribe #1: I performed the above scribed service and the documentation accurately describes the services I  performed. I attest to the accuracy of the note.    Attending Attestation:           Physician Attestation for Scribe:  Physician Attestation Statement for Scribe #1: I, Dr. Hui, reviewed documentation, as scribed by Luis Contreras in my presence, and it is both accurate and complete.                    Clinical Impression:     1. Abdominal pain        Disposition:   Disposition: Placed in Observation  Condition: Stable                        Lavon Hui MD  02/26/18 [id number]

## 2018-02-26 NOTE — H&P
[facility] Medicine  History & Physical    Patient Name: Hardik Jimenez  MRN: [id number]  Admission Date: 2/26/2018  Attending Physician: Bobo Loredo MD   Primary Care Provider: Kelvin Pablo DO         Patient information was obtained from patient and ER records.     Subjective:     Principal Problem:Abdominal pain    Chief Complaint:   Chief Complaint   Patient presents with    Abdominal Pain     continued abd pain from less than 24 hrs ago. reports unable to get nausea medication due to pharmacy being closed. took pain medication with no relief.        HPI: 41 y/o man with PMH renal stones and renal colic episodes presents with 4 day history of left flank pain and associated nausea and vomiting.  He was seen in the ED yesterday and had CT ABD that showed left nonobstructing nephrolithiasis; the kidneys enhance symmetrically without right nephrolithiasis or hydronephrosis, and the left ureter is unremarkable without calculi seen, the right ureter is unremarkable without calculi seen.  He comes back today and repeat CT findings are largely stable, also showing mild mural stratification and mucosal enhancement, predominantly of the left and sigmoid colon, somewhat nonspecific but includes enteritis/colitis (either infectious or inflammatory) as well as irritable and inflammatory bowel disease, no evidence of perforation or abscess.  He denies any fever and states this pain feels consistent with his past episodes of renal colic. UA yesterday and today are negative for blood.       Past Medical History:   Diagnosis Date    Asthma     Renal disorder     Kidney stones       History reviewed. No pertinent surgical history.    Review of patient's allergies indicates:  No Known Allergies    Current Facility-Administered Medications on File Prior to Encounter   Medication    [COMPLETED] omnipaque 350 iohexol 100 mL    [COMPLETED] sodium chloride 0.9% bolus 1,000 mL     Current  Outpatient Prescriptions on File Prior to Encounter   Medication Sig    dicyclomine (BENTYL) 20 mg tablet Take 1 tablet (20 mg total) by mouth 2 (two) times daily.    hydrocodone-acetaminophen 5-325mg (NORCO) 5-325 mg per tablet Take 2 tablets by mouth every 6 (six) hours as needed for Pain.    levocetirizine (XYZAL) 5 MG tablet Take 1 tablet (5 mg total) by mouth every evening.    [DISCONTINUED] ciprofloxacin HCl (CIPRO) 500 MG tablet Take 1 tablet (500 mg total) by mouth 2 (two) times daily.     Family History     None        Social History Main Topics    Smoking status: Never Smoker    Smokeless tobacco: Never Used    Alcohol use Yes      Comment: occasionally.    Drug use: Yes     Types: Marijuana    Sexual activity: Not on file     Review of Systems   Constitutional: Negative for appetite change and fever.   Respiratory: Negative for cough and shortness of breath.    Cardiovascular: Negative for chest pain and palpitations.   Gastrointestinal: Positive for abdominal pain. Negative for abdominal distention, nausea and vomiting.   Genitourinary: Negative for difficulty urinating and dysuria.        Left flank pain radiating to left groin     Skin: Negative for color change and rash.   Neurological: Negative for dizziness and weakness.   Psychiatric/Behavioral: Negative for agitation and confusion.     Objective:     Vital Signs (Most Recent):  Temp: 97.6 °F (36.4 °C) (02/26/18 0928)  Pulse: (!) 54 (02/26/18 1605)  Resp: 20 (02/26/18 1605)  BP: (!) 141/80 (02/26/18 1420)  SpO2: 100 % (02/26/18 1605) Vital Signs (24h Range):  Temp:  [97.6 °F (36.4 °C)] 97.6 °F (36.4 °C)  Pulse:  [52-86] 54  Resp:  [17-20] 20  SpO2:  [96 %-100 %] 100 %  BP: (136-141)/(80-97) 141/80     Weight: 117.9 kg (260 lb)  Body mass index is 34.3 kg/m².    Physical Exam   Constitutional: He is oriented to person, place, and time. He appears well-developed and well-nourished. He appears distressed.   Appears uncomfortable from pain    HENT:   Head: Normocephalic and atraumatic.   Eyes: Conjunctivae are normal. Pupils are equal, round, and reactive to light.   Neck: Normal range of motion. Neck supple.   Cardiovascular: Normal rate, regular rhythm and normal heart sounds.    Pulmonary/Chest: Effort normal and breath sounds normal.   Abdominal: Soft. Bowel sounds are normal. There is no tenderness. There is no rebound and no guarding.   Musculoskeletal: Normal range of motion. He exhibits no edema.   Neurological: He is alert and oriented to person, place, and time.   Skin: Skin is warm and dry.         CRANIAL NERVES     CN III, IV, VI   Pupils are equal, round, and reactive to light.       Significant Labs:   CBC:   Recent Labs  Lab 02/25/18  1421 02/26/18  1008   WBC 7.50 8.80   HGB 14.0 13.0*   HCT 39.7* 36.7*    194     CMP:   Recent Labs  Lab 02/25/18  1421 02/26/18  1008    139   K 3.8 3.8    107   CO2 21* 21*   * 119*   BUN 12 11   CREATININE 1.2 1.2   CALCIUM 9.4 9.0   PROT 7.6 6.9   ALBUMIN 4.2 3.8   BILITOT 0.6 0.5   ALKPHOS 73 68   AST 19 19   ALT 18 14   ANIONGAP 12 11   EGFRNONAA >60 >60     Lipase:   Recent Labs  Lab 02/25/18  1421 02/26/18  1008   LIPASE 59 11     Urine Culture: No results for input(s): LABURIN in the last 48 hours.  Urine Studies:   Recent Labs  Lab 02/26/18  1149   COLORU Yellow   APPEARANCEUA Clear   PHUR 7.0   SPECGRAV 1.015   PROTEINUA Negative   GLUCUA Negative   KETONESU Trace*   BILIRUBINUA Negative   OCCULTUA Negative   NITRITE Negative   UROBILINOGEN Negative   LEUKOCYTESUR Negative       Significant Imaging: I have reviewed all pertinent imaging results/findings within the past 24 hours.   Imaging Results          CT Abdomen Pelvis With Contrast (Final result)  Result time 02/26/18 16:12:13    Final result by Shar Hernandez MD (02/26/18 16:12:13)                 Impression:        There is mild mural stratification and mucosal enhancement, predominantly of the left and  sigmoid colon. Findings somewhat nonspecific but includes enteritis/colitis (either infectious or inflammatory) as well as irritable and inflammatory bowel disease. There is no evidence of perforation or abscess. No evidence of terminal ileum involvement.    Unchanged nonobstructing left sided nephrolithiasis.      Electronically signed by: CAYLA RENNER MD  Date:     02/26/18  Time:    16:12              Narrative:    Procedure comments: The patient was surveyed from the lung bases through the pelvis after the administration of 100 cc Omni 350 IV contrast  and data was reconstructed for coronal, sagittal, and axial images. Patient could not tolerate oral contrast.    Comparison: Same day noncontrasted CT, contrasted CT 2/25/18.    Findings:    The lung bases are clear.  No pleural effusion is seen.  The visualized portions of the heart appear normal.    The liver is normal in size and attenuation.  No focal hepatic abnormality is seen.  The gallbladder contains excreted contrast but is otherwise unremarkable.  No intrahepatic or extrahepatic biliary ductal dilatation.    The stomach, spleen, pancreas, and adrenal glands are unremarkable.    The kidneys are normal in size and location.  They concentrate and excrete contrast appropriately. Unchanged nonobstructing left renal calculi No hydronephrosis is seen.  The ureters appear normal in course and caliber without evidence of ureteral dilatation. The urinary bladder and prostate are unremarkable.     The visualized loops of small and large bowel show no evidence of obstruction or inflammation. There is mild enhancement of the mucosa of the colon, particularly the descending and sigmoid colon. No adjacent inflammatory change.    No ascites, free fluid, or intraperitoneal free air is noted. There is no evidence of lymph node enlargement in the abdomen or pelvis.    The abdominal aorta is normal in course and caliber without significant atherosclerotic  calcifications. The osseous structures demonstrate no evidence of acute fracture or osseous destructive process.  Lumbar DJD with vacuum disc phenomenon at L5-S1. The extraperitoneal soft tissues are unremarkable.                             CT Abdomen Pelvis  Without Contrast (Final result)  Result time 02/26/18 10:51:55    Final result by Rosa Arevalo MD (02/26/18 10:51:55)                 Impression:       No acute abnormality. Nonobstructing left renal stones.      Electronically signed by: ROSA AREVALO  Date:     02/26/18  Time:    10:51              Narrative:    HISTORY:  left flank pain     TECHNIQUE: Axial CT images acquired from the diaphragm through the ischial tuberosities without contrast.    Multiplanar reconstructions provided for review.    COMPARISON: CT of the abdomen and pelvis one day prior     FINDINGS:    Inferior Mediastinum/Heart: Normal.    Lung Bases: No nodules/consolidation.    Peritoneal Space: No ascites. No free air.    Liver: No focal mass.      Gallbladder: No calcified gallstones. Excretion of contrast is present within the gallbladder lumen.    Bile Ducts: No evidence of dilated ducts.    Pancreas: No mass or peripancreatic fat stranding.     Spleen: Normal.    Adrenals: Unremarkable.    Bowel/Mesentery: No evidence of bowel obstruction or inflammation.  Appendix is normal.    Urinary Tract: No evidence of obstructive uropathy. 2 nonobstructing stones measuring 3-5 mm are present in the left kidney. Small amount of contrast excretion is present in the ureters and bladder.    Retroperitoneum:  No significant adenopathy.     Pelvis:  No pelvic masses, adenopathy, or free fluid.     Abdominal wall:  Normal.     Vasculature: No aneurysm.      Bones: No acute fracture or bony destructive process.                            Assessment/Plan:     * Abdominal pain    - Abdominal exam benign.  - Appears consistent with renal colic.   - Consult GI for evaluation given CT findings possible  enteritis.            Left nephrolithiasis    - Noted on CT.             Renal colic on left side    - History suspicious for renal colic.  - CT shows left renal stone, but no evidence ureteral stone  - UA no blood.  Urine culture pending but no UA evidence infection.   - PE did not reveal abdominal tenderness, but patient appears quite uncomfortable.   - NS @ 125/hr  - Pain med PRN.  - Consult urology for evaluation.              Nausea and vomiting    - Phenergan and Zofran PRN.               VTE Risk Mitigation     None      Heparin 5000 q 8        Bobo Loredo MD  Department of Hospital Medicine   [facility]

## 2018-02-26 NOTE — ASSESSMENT & PLAN NOTE
- Abdominal exam benign.  - Appears consistent with renal colic.   - Consult GI for evaluation given CT findings possible enteritis.

## 2018-02-26 NOTE — HPI
Mr. Jimenez is a 42 year old man who presented with 4 days of left flank pain, nausea and vomiting.  He has not had fever or diarrhea.  He was seen in the ED the previous day and had a CT of the abdomen that showed left sided nonobstructing nephrolithiasis.  CT repeated with contrast on the current presentation showed nonspecific mucosal enhancement of the left and sigmoid colon, with differential of infectious or inflammatory colitis without evidence of perforation or abscess.  Patient felt his symptoms were typical of his previous episode of passing kidney stones, about 10 years ago.  Labs in ED showed a UA with a trace of ketones but no blood or leukocytes.  CBC was at baseline and chemistry was normal. Tox screen showed opiates and THC.  He was started on a clear liquid diet and IV fluids and admitted for further workup.    Patient reports having recurrent pain in the right side of his lower back that was diagnosed as a muscle strain last August.  He was prescribed ibuprofen 800 mg and Norco at that time.  He reports he took an ibuprofen last week but does not take it often.

## 2018-02-26 NOTE — ASSESSMENT & PLAN NOTE
- History suspicious for renal colic.  - CT shows left renal stone, but no evidence ureteral stone  - UA no blood.  Urine culture pending but no UA evidence infection.   - PE did not reveal abdominal tenderness, but patient appears quite uncomfortable.   - NS @ 125/hr  - Pain med PRN.  - Consult urology for evaluation.

## 2018-02-26 NOTE — ED NOTES
Pt rounding complete. Pt returned from ct scan. Pt reporting pain 5/10. Pt denies any needs at the moment. Pt does not appear to be in acute distress. Respirations are even and unlabored. VSS. Bed is locked and in lowest position with side rails up x2. Call light is within reach. Will continue to monitor.

## 2018-02-26 NOTE — SUBJECTIVE & OBJECTIVE
Past Medical History:   Diagnosis Date    Asthma     Renal disorder     Kidney stones       History reviewed. No pertinent surgical history.    Review of patient's allergies indicates:  No Known Allergies    Current Facility-Administered Medications on File Prior to Encounter   Medication    [COMPLETED] omnipaque 350 iohexol 100 mL    [COMPLETED] sodium chloride 0.9% bolus 1,000 mL     Current Outpatient Prescriptions on File Prior to Encounter   Medication Sig    dicyclomine (BENTYL) 20 mg tablet Take 1 tablet (20 mg total) by mouth 2 (two) times daily.    hydrocodone-acetaminophen 5-325mg (NORCO) 5-325 mg per tablet Take 2 tablets by mouth every 6 (six) hours as needed for Pain.    levocetirizine (XYZAL) 5 MG tablet Take 1 tablet (5 mg total) by mouth every evening.    [DISCONTINUED] ciprofloxacin HCl (CIPRO) 500 MG tablet Take 1 tablet (500 mg total) by mouth 2 (two) times daily.     Family History     None        Social History Main Topics    Smoking status: Never Smoker    Smokeless tobacco: Never Used    Alcohol use Yes      Comment: occasionally.    Drug use: Yes     Types: Marijuana    Sexual activity: Not on file     Review of Systems   Constitutional: Negative for appetite change and fever.   Respiratory: Negative for cough and shortness of breath.    Cardiovascular: Negative for chest pain and palpitations.   Gastrointestinal: Positive for abdominal pain. Negative for abdominal distention, nausea and vomiting.   Genitourinary: Negative for difficulty urinating and dysuria.        Left flank pain radiating to left groin     Skin: Negative for color change and rash.   Neurological: Negative for dizziness and weakness.   Psychiatric/Behavioral: Negative for agitation and confusion.     Objective:     Vital Signs (Most Recent):  Temp: 97.6 °F (36.4 °C) (02/26/18 0928)  Pulse: (!) 54 (02/26/18 1605)  Resp: 20 (02/26/18 1605)  BP: (!) 141/80 (02/26/18 1420)  SpO2: 100 % (02/26/18 1605) Vital Signs  (24h Range):  Temp:  [97.6 °F (36.4 °C)] 97.6 °F (36.4 °C)  Pulse:  [52-86] 54  Resp:  [17-20] 20  SpO2:  [96 %-100 %] 100 %  BP: (136-141)/(80-97) 141/80     Weight: 117.9 kg (260 lb)  Body mass index is 34.3 kg/m².    Physical Exam   Constitutional: He is oriented to person, place, and time. He appears well-developed and well-nourished. He appears distressed.   Appears uncomfortable from pain   HENT:   Head: Normocephalic and atraumatic.   Eyes: Conjunctivae are normal. Pupils are equal, round, and reactive to light.   Neck: Normal range of motion. Neck supple.   Cardiovascular: Normal rate, regular rhythm and normal heart sounds.    Pulmonary/Chest: Effort normal and breath sounds normal.   Abdominal: Soft. Bowel sounds are normal. There is no tenderness. There is no rebound and no guarding.   Musculoskeletal: Normal range of motion. He exhibits no edema.   Neurological: He is alert and oriented to person, place, and time.   Skin: Skin is warm and dry.         CRANIAL NERVES     CN III, IV, VI   Pupils are equal, round, and reactive to light.       Significant Labs:   CBC:   Recent Labs  Lab 02/25/18  1421 02/26/18  1008   WBC 7.50 8.80   HGB 14.0 13.0*   HCT 39.7* 36.7*    194     CMP:   Recent Labs  Lab 02/25/18  1421 02/26/18  1008    139   K 3.8 3.8    107   CO2 21* 21*   * 119*   BUN 12 11   CREATININE 1.2 1.2   CALCIUM 9.4 9.0   PROT 7.6 6.9   ALBUMIN 4.2 3.8   BILITOT 0.6 0.5   ALKPHOS 73 68   AST 19 19   ALT 18 14   ANIONGAP 12 11   EGFRNONAA >60 >60     Lipase:   Recent Labs  Lab 02/25/18  1421 02/26/18  1008   LIPASE 59 11     Urine Culture: No results for input(s): LABURIN in the last 48 hours.  Urine Studies:   Recent Labs  Lab 02/26/18  1149   COLORU Yellow   APPEARANCEUA Clear   PHUR 7.0   SPECGRAV 1.015   PROTEINUA Negative   GLUCUA Negative   KETONESU Trace*   BILIRUBINUA Negative   OCCULTUA Negative   NITRITE Negative   UROBILINOGEN Negative   LEUKOCYTESUR Negative        Significant Imaging: I have reviewed all pertinent imaging results/findings within the past 24 hours.   Imaging Results          CT Abdomen Pelvis With Contrast (Final result)  Result time 02/26/18 16:12:13    Final result by Shar Hernandez MD (02/26/18 16:12:13)                 Impression:        There is mild mural stratification and mucosal enhancement, predominantly of the left and sigmoid colon. Findings somewhat nonspecific but includes enteritis/colitis (either infectious or inflammatory) as well as irritable and inflammatory bowel disease. There is no evidence of perforation or abscess. No evidence of terminal ileum involvement.    Unchanged nonobstructing left sided nephrolithiasis.      Electronically signed by: SHAR HERNANDEZ MD  Date:     02/26/18  Time:    16:12              Narrative:    Procedure comments: The patient was surveyed from the lung bases through the pelvis after the administration of 100 cc Omni 350 IV contrast  and data was reconstructed for coronal, sagittal, and axial images. Patient could not tolerate oral contrast.    Comparison: Same day noncontrasted CT, contrasted CT 2/25/18.    Findings:    The lung bases are clear.  No pleural effusion is seen.  The visualized portions of the heart appear normal.    The liver is normal in size and attenuation.  No focal hepatic abnormality is seen.  The gallbladder contains excreted contrast but is otherwise unremarkable.  No intrahepatic or extrahepatic biliary ductal dilatation.    The stomach, spleen, pancreas, and adrenal glands are unremarkable.    The kidneys are normal in size and location.  They concentrate and excrete contrast appropriately. Unchanged nonobstructing left renal calculi No hydronephrosis is seen.  The ureters appear normal in course and caliber without evidence of ureteral dilatation. The urinary bladder and prostate are unremarkable.     The visualized loops of small and large bowel show no evidence  of obstruction or inflammation. There is mild enhancement of the mucosa of the colon, particularly the descending and sigmoid colon. No adjacent inflammatory change.    No ascites, free fluid, or intraperitoneal free air is noted. There is no evidence of lymph node enlargement in the abdomen or pelvis.    The abdominal aorta is normal in course and caliber without significant atherosclerotic calcifications. The osseous structures demonstrate no evidence of acute fracture or osseous destructive process.  Lumbar DJD with vacuum disc phenomenon at L5-S1. The extraperitoneal soft tissues are unremarkable.                             CT Abdomen Pelvis  Without Contrast (Final result)  Result time 02/26/18 10:51:55    Final result by Rosa Arevalo MD (02/26/18 10:51:55)                 Impression:       No acute abnormality. Nonobstructing left renal stones.      Electronically signed by: ROSA AREVALO  Date:     02/26/18  Time:    10:51              Narrative:    HISTORY:  left flank pain     TECHNIQUE: Axial CT images acquired from the diaphragm through the ischial tuberosities without contrast.    Multiplanar reconstructions provided for review.    COMPARISON: CT of the abdomen and pelvis one day prior     FINDINGS:    Inferior Mediastinum/Heart: Normal.    Lung Bases: No nodules/consolidation.    Peritoneal Space: No ascites. No free air.    Liver: No focal mass.      Gallbladder: No calcified gallstones. Excretion of contrast is present within the gallbladder lumen.    Bile Ducts: No evidence of dilated ducts.    Pancreas: No mass or peripancreatic fat stranding.     Spleen: Normal.    Adrenals: Unremarkable.    Bowel/Mesentery: No evidence of bowel obstruction or inflammation.  Appendix is normal.    Urinary Tract: No evidence of obstructive uropathy. 2 nonobstructing stones measuring 3-5 mm are present in the left kidney. Small amount of contrast excretion is present in the ureters and bladder.    Retroperitoneum:  No  significant adenopathy.     Pelvis:  No pelvic masses, adenopathy, or free fluid.     Abdominal wall:  Normal.     Vasculature: No aneurysm.      Bones: No acute fracture or bony destructive process.

## 2018-02-27 PROBLEM — E86.0 DEHYDRATION, MILD: Status: ACTIVE | Noted: 2018-02-27

## 2018-02-27 LAB
ALBUMIN SERPL BCP-MCNC: 3.4 G/DL
ALP SERPL-CCNC: 60 U/L
ALT SERPL W/O P-5'-P-CCNC: 19 U/L
ANION GAP SERPL CALC-SCNC: 6 MMOL/L
AST SERPL-CCNC: 25 U/L
BACTERIA UR CULT: NORMAL
BASOPHILS # BLD AUTO: 0.01 K/UL
BASOPHILS NFR BLD: 0.1 %
BILIRUB SERPL-MCNC: 0.5 MG/DL
BUN SERPL-MCNC: 9 MG/DL
CALCIUM SERPL-MCNC: 8.5 MG/DL
CHLORIDE SERPL-SCNC: 109 MMOL/L
CO2 SERPL-SCNC: 24 MMOL/L
CREAT SERPL-MCNC: 1.1 MG/DL
DIFFERENTIAL METHOD: ABNORMAL
EOSINOPHIL # BLD AUTO: 0 K/UL
EOSINOPHIL NFR BLD: 0.6 %
ERYTHROCYTE [DISTWIDTH] IN BLOOD BY AUTOMATED COUNT: 14 %
EST. GFR  (AFRICAN AMERICAN): >60 ML/MIN/1.73 M^2
EST. GFR  (NON AFRICAN AMERICAN): >60 ML/MIN/1.73 M^2
GLUCOSE SERPL-MCNC: 87 MG/DL
HCT VFR BLD AUTO: 34 %
HGB BLD-MCNC: 12.2 G/DL
LYMPHOCYTES # BLD AUTO: 2.6 K/UL
LYMPHOCYTES NFR BLD: 37.6 %
MAGNESIUM SERPL-MCNC: 2.1 MG/DL
MCH RBC QN AUTO: 29.8 PG
MCHC RBC AUTO-ENTMCNC: 35.9 G/DL
MCV RBC AUTO: 83 FL
MONOCYTES # BLD AUTO: 0.5 K/UL
MONOCYTES NFR BLD: 7.8 %
NEUTROPHILS # BLD AUTO: 3.7 K/UL
NEUTROPHILS NFR BLD: 53.5 %
PHOSPHATE SERPL-MCNC: 3.3 MG/DL
PLATELET # BLD AUTO: 201 K/UL
PMV BLD AUTO: 9 FL
POTASSIUM SERPL-SCNC: 4.6 MMOL/L
PROT SERPL-MCNC: 6.3 G/DL
RBC # BLD AUTO: 4.09 M/UL
SODIUM SERPL-SCNC: 139 MMOL/L
WBC # BLD AUTO: 6.91 K/UL

## 2018-02-27 PROCEDURE — 99900035 HC TECH TIME PER 15 MIN (STAT)

## 2018-02-27 PROCEDURE — 84100 ASSAY OF PHOSPHORUS: CPT

## 2018-02-27 PROCEDURE — 99233 SBSQ HOSP IP/OBS HIGH 50: CPT | Mod: ,,, | Performed by: HOSPITALIST

## 2018-02-27 PROCEDURE — 94761 N-INVAS EAR/PLS OXIMETRY MLT: CPT

## 2018-02-27 PROCEDURE — 80053 COMPREHEN METABOLIC PANEL: CPT

## 2018-02-27 PROCEDURE — 25000003 PHARM REV CODE 250: Performed by: EMERGENCY MEDICINE

## 2018-02-27 PROCEDURE — 25000003 PHARM REV CODE 250: Performed by: HOSPITALIST

## 2018-02-27 PROCEDURE — 85025 COMPLETE CBC W/AUTO DIFF WBC: CPT

## 2018-02-27 PROCEDURE — 36415 COLL VENOUS BLD VENIPUNCTURE: CPT

## 2018-02-27 PROCEDURE — 11000001 HC ACUTE MED/SURG PRIVATE ROOM

## 2018-02-27 PROCEDURE — 99253 IP/OBS CNSLTJ NEW/EST LOW 45: CPT | Mod: ,,, | Performed by: NURSE PRACTITIONER

## 2018-02-27 PROCEDURE — 83735 ASSAY OF MAGNESIUM: CPT

## 2018-02-27 PROCEDURE — 63600175 PHARM REV CODE 636 W HCPCS: Performed by: HOSPITALIST

## 2018-02-27 RX ADMIN — SODIUM CHLORIDE: 0.9 INJECTION, SOLUTION INTRAVENOUS at 12:02

## 2018-02-27 RX ADMIN — SODIUM CHLORIDE: 0.9 INJECTION, SOLUTION INTRAVENOUS at 08:02

## 2018-02-27 RX ADMIN — SODIUM CHLORIDE: 0.9 INJECTION, SOLUTION INTRAVENOUS at 03:02

## 2018-02-27 RX ADMIN — HYDROMORPHONE HYDROCHLORIDE 1 MG: 1 INJECTION, SOLUTION INTRAMUSCULAR; INTRAVENOUS; SUBCUTANEOUS at 06:02

## 2018-02-27 RX ADMIN — PROMETHAZINE HYDROCHLORIDE 25 MG: 25 INJECTION INTRAMUSCULAR; INTRAVENOUS at 12:02

## 2018-02-27 RX ADMIN — HEPARIN SODIUM 5000 UNITS: 5000 INJECTION, SOLUTION INTRAVENOUS; SUBCUTANEOUS at 03:02

## 2018-02-27 RX ADMIN — PROMETHAZINE HYDROCHLORIDE 25 MG: 25 INJECTION INTRAMUSCULAR; INTRAVENOUS at 08:02

## 2018-02-27 RX ADMIN — HEPARIN SODIUM 5000 UNITS: 5000 INJECTION, SOLUTION INTRAVENOUS; SUBCUTANEOUS at 05:02

## 2018-02-27 RX ADMIN — HYDROMORPHONE HYDROCHLORIDE 1 MG: 1 INJECTION, SOLUTION INTRAMUSCULAR; INTRAVENOUS; SUBCUTANEOUS at 12:02

## 2018-02-27 RX ADMIN — HYDROMORPHONE HYDROCHLORIDE 1 MG: 1 INJECTION, SOLUTION INTRAMUSCULAR; INTRAVENOUS; SUBCUTANEOUS at 08:02

## 2018-02-27 RX ADMIN — HYDROMORPHONE HYDROCHLORIDE 1 MG: 1 INJECTION, SOLUTION INTRAMUSCULAR; INTRAVENOUS; SUBCUTANEOUS at 03:02

## 2018-02-27 NOTE — HOSPITAL COURSE
Patient was admitted and urology was consulted given the previous history of kidney stones.  In addition a scrotal ultrasound was done as he complained of left flank pain with radiation to the left groin.  No etiology was found, although he was unable to lie still and the evaluation was somewhat limited as a result.  He was seen by Urology, and they did not feel patient's pain was attributable to anything going on in the urinary tract.  In the meantime, his CT did not show evidence of diverticulitis.  He was continued on IV fluids, pain medication and antiemetics while awaiting GI consultation.  GI recommended outpatient colonoscopy as symptoms seemed referable to the sigmoid colon.  At discharge patient's pain had improved and he was tolerating a diet.

## 2018-02-27 NOTE — ASSESSMENT & PLAN NOTE
- Abdominal exam benign.  - Symptoms seemed consistent with renal colic but urology does not feel there is a urological cause.   - Scrotal ultrasound limited (due to inability to cooperate) but negative.  - UA benign and culture showed no growth  - Consult GI for evaluation given CT findings of possible enteritis as well as patient's occasional NSAID use.  - Continue NS @ 125/hr for dehydration given ketones in urine and patient's report of vomiting  - Pain meds PRN.

## 2018-02-27 NOTE — CONSULTS
"[facility]  Urology  Consult Note    Patient Name: Hardik Jimenez  MRN: [id number]  Admission Date: 2/26/2018  Hospital Length of Stay: 0   Code Status: Full Code   Attending Provider: Adri Colmenares MD   Consulting Provider: Lesley Segovia NP  Primary Care Physician: Kelvin Pablo DO  Principal Problem:Abdominal pain    Inpatient consult to Urology  Consult performed by: LESLEY SEGOVIA  Consult ordered by: DAVONTE ENGLAND  Reason for consult: Left nephrolithiasis   Assessment/Recommendations: Left nephrolithiasis  -- CT stone study- No evidence of obstructive uropathy. 2 nonobstructing left renal stones stones (3-5 mm).  -- Urine culture negative on 2/25 and UA negative for RBCs  -- No urinary symptoms  -- Abdominal pain does not appear urologic in origin               Subjective:     HPI:  Mr. Jimenez is a 42 y.o. male who presented to the ED yesterday with LLQ abdominal/flank pain which began 4 days ago. The patient describes the pain as aching and "hot" with radiation to the groin. Associated nausea/vomiting, denies constipation and diarrhea. Denies fever/chills, dysuria, frequency, urgency and hematuria. Urine culture from 2/25 showed "no significant growth" and UA was negative for RBCs. CT revealed "no evidence of obstructive uropathy. 2 non obstructing stores measuring 3-5 mm present in the left kidney." Previous history of stones. Reports passing a kidney stone without intervention about 10 years ago.      Past Medical History:   Diagnosis Date    Asthma     Renal disorder     Kidney stones       History reviewed. No pertinent surgical history.    Review of patient's allergies indicates:  No Known Allergies    Family History     None          Social History Main Topics    Smoking status: Never Smoker    Smokeless tobacco: Never Used    Alcohol use Yes      Comment: occasionally.    Drug use: Yes     Types: Marijuana    Sexual activity: Not on file       Review of " Systems   Constitutional: Positive for fatigue. Negative for chills and fever.   Respiratory: Negative for chest tightness and shortness of breath.    Cardiovascular: Negative for chest pain and palpitations.   Gastrointestinal: Positive for abdominal pain, nausea and vomiting. Negative for abdominal distention, constipation and diarrhea.   Genitourinary: Positive for flank pain. Negative for difficulty urinating, dysuria, frequency, hematuria and urgency.   Musculoskeletal: Positive for back pain.       Objective:     Temp:  [97.6 °F (36.4 °C)-98.9 °F (37.2 °C)] 98.4 °F (36.9 °C)  Pulse:  [51-86] 51  Resp:  [17-20] 18  SpO2:  [94 %-100 %] 97 %  BP: (107-141)/(59-97) 111/69     Body mass index is 34.3 kg/m².      Date 02/27/18 0700 - 02/28/18 0659   Shift 1186-7736 6988-0028 0188-8797 24 Hour Total   I  N  T  A  K  E   I.V.  (mL/kg) 1298.6  (11)   1298.6  (11)    Shift Total  (mL/kg) 1298.6  (11)   1298.6  (11)   O  U  T  P  U  T   Shift Total  (mL/kg)       Weight (kg) 117.9 117.9 117.9 117.9          Drains          No matching active lines, drains, or airways          Physical Exam   Constitutional: He is oriented to person, place, and time. He appears well-developed and well-nourished.   Cardiovascular: Normal rate, regular rhythm, S1 normal and S2 normal.    Pulmonary/Chest: Effort normal and breath sounds normal.   Abdominal: Soft. Bowel sounds are normal. He exhibits no distension. There is tenderness in the left upper quadrant and left lower quadrant. There is no rebound and no CVA tenderness.   Neurological: He is alert and oriented to person, place, and time.   Skin: Skin is warm and dry.     Psychiatric: He has a normal mood and affect. His behavior is normal.       Significant Labs:    BMP:    Recent Labs  Lab 02/25/18  1421 02/26/18  1008 02/27/18  0527    139 139   K 3.8 3.8 4.6    107 109   CO2 21* 21* 24   BUN 12 11 9   CREATININE 1.2 1.2 1.1   CALCIUM 9.4 9.0 8.5*       CBC:    Recent  "Labs  Lab 02/25/18  1421 02/26/18  1008 02/27/18  0527   WBC 7.50 8.80 6.91   HGB 14.0 13.0* 12.2*   HCT 39.7* 36.7* 34.0*    194 201       Urine Culture:   Recent Labs  Lab 02/25/18  1709   LABURIN No significant growth     Urine Studies:   Recent Labs  Lab 02/25/18  1709 02/26/18  1149   COLORU Yellow  Yellow Yellow   APPEARANCEUA Clear  Clear Clear   PHUR 7.0  7.0 7.0   SPECGRAV 1.020  1.020 1.015   PROTEINUA Negative  Negative Negative   GLUCUA Negative  Negative Negative   KETONESU Negative  Negative Trace*   BILIRUBINUA Negative  Negative Negative   OCCULTUA Negative  Negative Negative   NITRITE Negative  Negative Negative   UROBILINOGEN Negative  Negative Negative   LEUKOCYTESUR Negative  Negative Negative       Significant Imaging:  CT: I have reviewed all results within the past 24 hours and my personal findings are:  " No evidence of obstructive uropathy. 2 nonobstructing stones measuring 3-5 mm are present in the left kidney. Small amount of contrast excretion is present in the ureters and bladder."                    Assessment and Plan:     Left nephrolithiasis    -- CT stone study- No evidence of obstructive uropathy. 2 nonobstructing left renal stones stones (3-5 mm).  -- Urine culture negative on 2/25 and UA negative for RBCs  -- No urinary symptoms  -- Abdominal pain does not appear urologic in origin             VTE Risk Mitigation         Ordered     heparin (porcine) injection 5,000 Units  Every 8 hours     Route:  Subcutaneous        02/26/18 1920     Medium Risk of VTE  Once      02/26/18 1920          Thank you for your consult. I will sign off. Please contact us if you have any additional questions.    Lesley Villanueva NP  Urology  [facility]  "

## 2018-02-27 NOTE — HPI
"Mr. Jimenez is a 42 y.o. male who presented to the ED yesterday with LLQ abdominal/flank pain which began 4 days ago. The patient describes the pain as aching and "hot" with radiation to the groin. Associated nausea/vomiting, denies constipation and diarrhea. Denies fever/chills, dysuria, frequency, urgency and hematuria. Urine culture from 2/25 showed "no significant growth" and UA was negative for RBCs. CT revealed "no evidence of obstructive uropathy. 2 non obstructing stores measuring 3-5 mm present in the left kidney." Previous history of stones. Reports passing a kidney stone without intervention about 10 years ago.    "

## 2018-02-27 NOTE — CONSULTS
[facility] Hospital Medicine  Consult Note    Patient Name: Hardik Jimenez  MRN: [id number]  Admission Date: 2/26/2018  Hospital Length of Stay: 0 days  Attending Physician:   Primary Care Provider: Kelvin Pablo DO           Patient information was obtained from      Inpatient consult to Gastroenterology  Consult performed by: ALONZO ULRICH  Consult ordered by: DAVONTE ENGLAND  Reason for consult: abdominal pain        Subjective: feels better     Principal Problem:Abdominal pain    Chief Complaint:   Chief Complaint   Patient presents with    Abdominal Pain     continued abd pain from less than 24 hrs ago. reports unable to get nausea medication due to pharmacy being closed. took pain medication with no relief.        HPI: This gent presented to the ED with a 2 day history of LLQ pain. His pain is moderate and cramp like and relieved by nothing. He has had difficulty evacuating stool lately and at times sits on a commode for prolonged time periods only to become diaphoretic and feel nauseated. He stated I the remote past he passed some blood in minute amounts with a harsh bowel movement. He has a history of kidney stones that have produce distinct symptoms pertaining to his flank. These symptoms are different in that there is no radiation and involves only the LLQ. He has not been on antibiotics recently and he denies diarrhea. There is no  family history of colon cancer or IBD. His CT scan shows inflammatory changes in the left colon. No abscess or diverticulitis. We discussed diet and it was felt that he should have a colonoscopy once he is able to tolerate a prep. This can be done as an outpatient.    Past Medical History:   Diagnosis Date    Asthma     Renal disorder     Kidney stones       History reviewed. No pertinent surgical history.    Review of patient's allergies indicates:  No Known Allergies    No current facility-administered medications on file prior to encounter.       Current Outpatient Prescriptions on File Prior to Encounter   Medication Sig    dicyclomine (BENTYL) 20 mg tablet Take 1 tablet (20 mg total) by mouth 2 (two) times daily.    hydrocodone-acetaminophen 5-325mg (NORCO) 5-325 mg per tablet Take 2 tablets by mouth every 6 (six) hours as needed for Pain.    [DISCONTINUED] levocetirizine (XYZAL) 5 MG tablet Take 1 tablet (5 mg total) by mouth every evening.     Family History     None        Social History Main Topics    Smoking status: Never Smoker    Smokeless tobacco: Never Used    Alcohol use Yes      Comment: occasionally.    Drug use: Yes     Types: Marijuana    Sexual activity: Not on file     Review of Systems   Constitutional: Positive for activity change, appetite change, chills, diaphoresis and fatigue. Negative for fever and unexpected weight change.   HENT: Negative for congestion, dental problem, facial swelling, hearing loss, mouth sores and sinus pressure.    Eyes: Negative for discharge and itching.   Respiratory: Negative for apnea, cough, chest tightness, shortness of breath and wheezing.    Cardiovascular: Negative for palpitations and leg swelling.   Gastrointestinal: Positive for abdominal pain, blood in stool, constipation, nausea and vomiting. Negative for abdominal distention, anal bleeding, diarrhea and rectal pain.   Endocrine: Negative for cold intolerance, heat intolerance and polydipsia.   Genitourinary: Negative for difficulty urinating, dysuria and enuresis.   Musculoskeletal: Positive for arthralgias, back pain, myalgias and neck pain. Negative for gait problem, joint swelling and neck stiffness.   Skin: Negative for color change, pallor, rash and wound.   Allergic/Immunologic: Negative for environmental allergies, food allergies and immunocompromised state.   Neurological: Positive for weakness. Negative for dizziness, facial asymmetry and headaches.   Hematological: Negative for adenopathy. Does not bruise/bleed easily.    Psychiatric/Behavioral: Negative for agitation, behavioral problems and confusion.     Objective:     Vital Signs (Most Recent):  Temp: 98.8 °F (37.1 °C) (02/27/18 1143)  Pulse: 62 (02/27/18 1639)  Resp: 18 (02/27/18 1639)  BP: 113/73 (02/27/18 1639)  SpO2: 98 % (02/27/18 1639) Vital Signs (24h Range):  Temp:  [98.2 °F (36.8 °C)-98.9 °F (37.2 °C)] 98.8 °F (37.1 °C)  Pulse:  [51-63] 62  Resp:  [17-20] 18  SpO2:  [94 %-98 %] 98 %  BP: (107-119)/(59-73) 113/73     Weight: 117.9 kg (260 lb)  Body mass index is 34.3 kg/m².    Physical Exam   Constitutional: He is oriented to person, place, and time. He appears well-developed and well-nourished. No distress.   HENT:   Head: Normocephalic and atraumatic.   Mouth/Throat: No oropharyngeal exudate.   Eyes: EOM are normal. Pupils are equal, round, and reactive to light.   Neck: Normal range of motion. Neck supple.   Cardiovascular: Normal rate and regular rhythm.  Exam reveals no gallop.    No murmur heard.  Pulmonary/Chest: Effort normal and breath sounds normal. No respiratory distress.   Abdominal: Soft. Bowel sounds are normal. He exhibits no distension and no mass. There is no tenderness. There is no rebound and no guarding. No hernia.   Musculoskeletal: He exhibits no edema or deformity.   Neurological: He is alert and oriented to person, place, and time. No cranial nerve deficit.   Skin: Skin is warm and dry. He is not diaphoretic.   Psychiatric: He has a normal mood and affect. His behavior is normal. Thought content normal.   Nursing note and vitals reviewed.      Significant Labs: reviewed current labs and prior labs    Significant Imaging: reviewed all CT scans    Assessment/Plan: Patient with LLQ pain suggestive of a colitis. He has issues with occasional constipation. The CT supports an inflammatory disorder of the left colon though no diverticula were noted. Will need eventual colonoscopy which can be done as an outpatient.     Active Diagnoses:    Diagnosis  Date Noted POA    PRINCIPAL PROBLEM:  Abdominal pain [R10.9] 02/26/2018 Yes    Dehydration, mild [E86.0] 02/27/2018 Yes    Nausea and vomiting [R11.2] 02/26/2018 Yes    Left nephrolithiasis [N20.0] 02/26/2018 Yes      Problems Resolved During this Admission:    Diagnosis Date Noted Date Resolved POA     VTE Risk Mitigation         Ordered     heparin (porcine) injection 5,000 Units  Every 8 hours     Route:  Subcutaneous        02/26/18 1920     Medium Risk of VTE  Once      02/26/18 1920          HOS POC IP DISCHARGE SUMMARY    Thank you for your consult.     Casimiro Noland MD  Department of Hospital Medicine   [facility]

## 2018-02-27 NOTE — PLAN OF CARE
Problem: Patient Care Overview  Goal: Plan of Care Review  Outcome: Ongoing (interventions implemented as appropriate)  Plan of care reviewed with Pt. Purposeful hourly rounding performed. VSS. NAD during shift. IV fluids as ordered, Clr liq diet maintained. Pain controlled with PRN medication. 2 episodes of emesis during shift. Nausea controlled with PRN medication. No c/o at this time. Bed locked and lowered, call light in reach. Will continue to monitor.

## 2018-02-27 NOTE — SUBJECTIVE & OBJECTIVE
Past Medical History:   Diagnosis Date    Asthma     Renal disorder     Kidney stones       History reviewed. No pertinent surgical history.    Review of patient's allergies indicates:  No Known Allergies    Family History     None          Social History Main Topics    Smoking status: Never Smoker    Smokeless tobacco: Never Used    Alcohol use Yes      Comment: occasionally.    Drug use: Yes     Types: Marijuana    Sexual activity: Not on file       Review of Systems   Constitutional: Positive for fatigue. Negative for chills and fever.   Respiratory: Negative for chest tightness and shortness of breath.    Cardiovascular: Negative for chest pain and palpitations.   Gastrointestinal: Positive for abdominal pain, nausea and vomiting. Negative for abdominal distention, constipation and diarrhea.   Genitourinary: Positive for flank pain. Negative for difficulty urinating, dysuria, frequency, hematuria and urgency.   Musculoskeletal: Positive for back pain.       Objective:     Temp:  [97.6 °F (36.4 °C)-98.9 °F (37.2 °C)] 98.4 °F (36.9 °C)  Pulse:  [51-86] 51  Resp:  [17-20] 18  SpO2:  [94 %-100 %] 97 %  BP: (107-141)/(59-97) 111/69     Body mass index is 34.3 kg/m².      Date 02/27/18 0700 - 02/28/18 0659   Shift 5819-7989 5317-3619 0432-8545 24 Hour Total   I  N  T  A  K  E   I.V.  (mL/kg) 1298.6  (11)   1298.6  (11)    Shift Total  (mL/kg) 1298.6  (11)   1298.6  (11)   O  U  T  P  U  T   Shift Total  (mL/kg)       Weight (kg) 117.9 117.9 117.9 117.9          Drains          No matching active lines, drains, or airways          Physical Exam   Constitutional: He is oriented to person, place, and time. He appears well-developed and well-nourished.   Cardiovascular: Normal rate, regular rhythm, S1 normal and S2 normal.    Pulmonary/Chest: Effort normal and breath sounds normal.   Abdominal: Soft. Bowel sounds are normal. He exhibits no distension. There is tenderness in the left upper quadrant and left lower  "quadrant. There is no rebound and no CVA tenderness.   Neurological: He is alert and oriented to person, place, and time.   Skin: Skin is warm and dry.     Psychiatric: He has a normal mood and affect. His behavior is normal.       Significant Labs:    BMP:    Recent Labs  Lab 02/25/18  1421 02/26/18  1008 02/27/18  0527    139 139   K 3.8 3.8 4.6    107 109   CO2 21* 21* 24   BUN 12 11 9   CREATININE 1.2 1.2 1.1   CALCIUM 9.4 9.0 8.5*       CBC:    Recent Labs  Lab 02/25/18  1421 02/26/18  1008 02/27/18  0527   WBC 7.50 8.80 6.91   HGB 14.0 13.0* 12.2*   HCT 39.7* 36.7* 34.0*    194 201       Urine Culture:   Recent Labs  Lab 02/25/18  1709   LABURIN No significant growth     Urine Studies:   Recent Labs  Lab 02/25/18  1709 02/26/18  1149   COLORU Yellow  Yellow Yellow   APPEARANCEUA Clear  Clear Clear   PHUR 7.0  7.0 7.0   SPECGRAV 1.020  1.020 1.015   PROTEINUA Negative  Negative Negative   GLUCUA Negative  Negative Negative   KETONESU Negative  Negative Trace*   BILIRUBINUA Negative  Negative Negative   OCCULTUA Negative  Negative Negative   NITRITE Negative  Negative Negative   UROBILINOGEN Negative  Negative Negative   LEUKOCYTESUR Negative  Negative Negative       Significant Imaging:  CT: I have reviewed all results within the past 24 hours and my personal findings are:  " No evidence of obstructive uropathy. 2 nonobstructing stones measuring 3-5 mm are present in the left kidney. Small amount of contrast excretion is present in the ureters and bladder."                "

## 2018-02-27 NOTE — PLAN OF CARE
02/27/18 1606   Discharge Assessment   Assessment Type Discharge Planning Assessment   Confirmed/corrected address and phone number on facesheet? Yes   Assessment information obtained from? Patient;Caregiver;Medical Record   Prior to hospitilization cognitive status: Alert/Oriented   Prior to hospitalization functional status: Independent   Current cognitive status: Alert/Oriented   Current Functional Status: Assistive Equipment   Able to Return to Prior Arrangements yes   Is patient able to care for self after discharge? Yes   Equipment Currently Used at Home none   Do you have any problems affording any of your prescribed medications? TBD   Is the patient taking medications as prescribed? yes   Does the patient have transportation home? Yes   Transportation Available family or friend will provide   Discharge Plan A Home   Patient/Family In Agreement With Plan yes

## 2018-02-27 NOTE — PLAN OF CARE
Problem: Patient Care Overview  Goal: Plan of Care Review  Outcome: Ongoing (interventions implemented as appropriate)  No changes.  Will continue to monitor

## 2018-02-27 NOTE — ASSESSMENT & PLAN NOTE
-- CT stone study- No evidence of obstructive uropathy. 2 nonobstructing left renal stones stones (3-5 mm).  -- Urine culture negative on 2/25 and UA negative for RBCs  -- No urinary symptoms  -- Abdominal pain does not appear urologic in origin

## 2018-02-27 NOTE — PROGRESS NOTES
[facility] Hospital Medicine  Progress Note    Patient Name: Hardik Jimenez  MRN: [id number]  Patient Class: IP- Inpatient   Admission Date: 2/26/2018  Length of Stay: 0 days  Attending Physician: Adri Colmenares MD  Primary Care Provider: Kelvin Pablo DO        Subjective:     Principal Problem:Abdominal pain    HPI:  Mr. Jimenez is a 42 year old man who presented with 4 days of left flank pain, nausea and vomiting.  He has not had fever or diarrhea.  He was seen in the ED the previous day and had a CT of the abdomen that showed left sided nonobstructing nephrolithiasis.  CT repeated with contrast on the current presentation showed nonspecific mucosal enhancement of the left and sigmoid colon, with differential of infectious or inflammatory colitis without evidence of perforation or abscess.  Patient felt his symptoms were typical of his previous episode of passing kidney stones, about 10 years ago.  Labs in ED showed a UA with a trace of ketones but no blood or leukocytes.  CBC was at baseline and chemistry was normal. Tox screen showed opiates and THC.  He was started on a clear liquid diet and IV fluids and admitted for further workup.    Patient reports having recurrent pain in the right side of his lower back that was diagnosed as a muscle strain last August.  He was prescribed ibuprofen 800 mg and Norco at that time.  He reports he took an ibuprofen last week but does not take it often.    Hospital Course:  Patient was admitted and urology was consulted given the previous history of kidney stones.  In addition a scrotal ultrasound was done as he complained of left flank pain with radiation to the left groin.  No etiology was found, although he was unable to lie still and the evaluation was somewhat limited as a result.  He was seen by Urology, and they did not feel patient's pain was attributable to anything going on in the urinary tract.  In the meantime, his CT did not show  evidence of diverticulitis.  He was continued on IV fluids, pain medication and antiemetics while awaiting GI consultation.    Interval History:  Patient is new to me.  He is frustrated that a diagnosis has not been found.  Seen by urology this AM, and source of pain is not urologic.  His pain is improved but not resolved and still has radiation to left groin.  We discussed his NSAID use but he does not feel he takes it often enough to cause an ulcer.    Review of Systems   Constitutional: Negative for chills and fever.   Respiratory: Negative for cough and shortness of breath.    Cardiovascular: Negative for chest pain and palpitations.     Objective:     Vital Signs (Most Recent):  Temp: 98.8 °F (37.1 °C) (02/27/18 1143)  Pulse: (!) 51 (02/27/18 1143)  Resp: 17 (02/27/18 1143)  BP: 117/72 (02/27/18 1143)  SpO2: 98 % (02/27/18 1143) Vital Signs (24h Range):  Temp:  [98.2 °F (36.8 °C)-98.9 °F (37.2 °C)] 98.8 °F (37.1 °C)  Pulse:  [51-63] 51  Resp:  [17-20] 17  SpO2:  [94 %-100 %] 98 %  BP: (107-119)/(59-72) 117/72     Weight: 117.9 kg (260 lb)  Body mass index is 34.3 kg/m².    Intake/Output Summary (Last 24 hours) at 02/27/18 1447  Last data filed at 02/27/18 0700   Gross per 24 hour   Intake          1748.59 ml   Output              452 ml   Net          1296.59 ml      Physical Exam   Constitutional: He is oriented to person, place, and time. He appears well-developed and well-nourished. No distress.   HENT:   Head: Normocephalic.   Eyes: Conjunctivae are normal. Pupils are equal, round, and reactive to light.   Neck: Neck supple. No thyromegaly present.   Cardiovascular: Normal rate, regular rhythm, normal heart sounds and intact distal pulses.  Exam reveals no gallop and no friction rub.    No murmur heard.  Pulmonary/Chest: Effort normal and breath sounds normal.   Abdominal: Soft. He exhibits no distension. There is no tenderness.   Bowel sounds mildly hyperactive.   Musculoskeletal: Normal range of motion.  He exhibits no edema.   Lymphadenopathy:     He has no cervical adenopathy.   Neurological: He is alert and oriented to person, place, and time.   Strength equal and symmetric   Skin: Skin is warm and dry. No rash noted.   Psychiatric: He has a normal mood and affect. His behavior is normal. Thought content normal.       Significant Labs:   BMP:   Recent Labs  Lab 02/27/18  0527   GLU 87      K 4.6      CO2 24   BUN 9   CREATININE 1.1   CALCIUM 8.5*   MG 2.1     CBC:   Recent Labs  Lab 02/26/18  1008 02/27/18  0527   WBC 8.80 6.91   HGB 13.0* 12.2*   HCT 36.7* 34.0*    201       Significant Imaging: I have reviewed all pertinent imaging results/findings within the past 24 hours.    Assessment/Plan:      * Abdominal pain    - Abdominal exam benign.  - Symptoms seemed consistent with renal colic but urology does not feel there is a urological cause.   - Scrotal ultrasound limited (due to inability to cooperate) but negative.  - UA benign and culture showed no growth  - Consult GI for evaluation given CT findings of possible enteritis as well as patient's occasional NSAID use.  - Continue NS @ 125/hr for dehydration given ketones in urine and patient's report of vomiting  - Pain meds PRN.          Nausea and vomiting    - Phenergan and Zofran PRN.             Left nephrolithiasis    - Noted on CT.   - Non obstructing              VTE Risk Mitigation         Ordered     heparin (porcine) injection 5,000 Units  Every 8 hours     Route:  Subcutaneous        02/26/18 1920     Medium Risk of VTE  Once      02/26/18 1920              Adri Smith MD  Department of Hospital Medicine   [facility]

## 2018-02-28 VITALS
TEMPERATURE: 98 F | WEIGHT: 260 LBS | HEIGHT: 73 IN | OXYGEN SATURATION: 98 % | RESPIRATION RATE: 16 BRPM | SYSTOLIC BLOOD PRESSURE: 133 MMHG | BODY MASS INDEX: 34.46 KG/M2 | DIASTOLIC BLOOD PRESSURE: 82 MMHG | HEART RATE: 63 BPM

## 2018-02-28 PROCEDURE — 63600175 PHARM REV CODE 636 W HCPCS: Performed by: HOSPITALIST

## 2018-02-28 PROCEDURE — 99900035 HC TECH TIME PER 15 MIN (STAT)

## 2018-02-28 PROCEDURE — 99238 HOSP IP/OBS DSCHRG MGMT 30/<: CPT | Mod: ,,, | Performed by: HOSPITALIST

## 2018-02-28 PROCEDURE — 94761 N-INVAS EAR/PLS OXIMETRY MLT: CPT

## 2018-02-28 PROCEDURE — 25000003 PHARM REV CODE 250: Performed by: EMERGENCY MEDICINE

## 2018-02-28 RX ADMIN — HYDROMORPHONE HYDROCHLORIDE 1 MG: 1 INJECTION, SOLUTION INTRAMUSCULAR; INTRAVENOUS; SUBCUTANEOUS at 08:02

## 2018-02-28 RX ADMIN — SODIUM CHLORIDE: 0.9 INJECTION, SOLUTION INTRAVENOUS at 05:02

## 2018-02-28 RX ADMIN — HEPARIN SODIUM 5000 UNITS: 5000 INJECTION, SOLUTION INTRAVENOUS; SUBCUTANEOUS at 05:02

## 2018-02-28 NOTE — PROGRESS NOTES
This gent is feeling better. Less abdominal pain and he denies nausea or vomiting. No signs of blood loss or cholangitis or visceral ischemia.    General: no distress, sleepy  T<100 HR 66  Anicteric no temporal wasting  Neck supple no mass  Heart no gallops, RR  Chest clear no wheeze  Abdomen soft active sounds no masses or tenderness or ascites  Extremities no cyanosis or edema  Psyche appears depressed, alert    Labs as reported.  Reviewed CT again  Will check KUB and advance diet as tolerated.  ?OP colonoscopy.

## 2018-02-28 NOTE — PLAN OF CARE
Problem: Patient Care Overview  Goal: Plan of Care Review  Outcome: Ongoing (interventions implemented as appropriate)  Patient is AAOx3, sating good in room air, vitals WNL. IVF maintained, advised patient to notify nurse if in pain, patient acknowledged instructions given. Resting comforatbly,rounding done, safety maintained.

## 2018-02-28 NOTE — NURSING
Discharge paperwork reviewed with pt at bedside. All prescriptions reviewed. Teaching provided on special diet pt should follow for abdominal pain. All questions answered. IV removed without complication. Escorted to elevator and off unit.

## 2018-02-28 NOTE — PLAN OF CARE
Problem: Patient Care Overview  Goal: Plan of Care Review  Outcome: Ongoing (interventions implemented as appropriate)  No requests for PRN rx.

## 2018-03-01 NOTE — DISCHARGE SUMMARY
[facility] Hospital Medicine  Discharge Summary      Patient Name: Hardik Jimenez  MRN: [id number]  Admission Date: 2/26/2018  Hospital Length of Stay: 1 days  Discharge Date and Time:  02/28/2018 6:41 PM  Attending Physician: No att. providers found   Discharging Provider: Adri Smith MD  Primary Care Provider: Kelvin Pablo DO      HPI:   Mr. Jimenez is a 42 year old man who presented with 4 days of left flank pain, nausea and vomiting.  He has not had fever or diarrhea.  He was seen in the ED the previous day and had a CT of the abdomen that showed left sided nonobstructing nephrolithiasis.  CT repeated with contrast on the current presentation showed nonspecific mucosal enhancement of the left and sigmoid colon, with differential of infectious or inflammatory colitis without evidence of perforation or abscess.  Patient felt his symptoms were typical of his previous episode of passing kidney stones, about 10 years ago.  Labs in ED showed a UA with a trace of ketones but no blood or leukocytes.  CBC was at baseline and chemistry was normal. Tox screen showed opiates and THC.  He was started on a clear liquid diet and IV fluids and admitted for further workup.    Patient reports having recurrent pain in the right side of his lower back that was diagnosed as a muscle strain last August.  He was prescribed ibuprofen 800 mg and Norco at that time.  He reports he took an ibuprofen last week but does not take it often.          Hospital Course:   Patient was admitted and urology was consulted given the previous history of kidney stones.  In addition a scrotal ultrasound was done as he complained of left flank pain with radiation to the left groin.  No etiology was found, although he was unable to lie still and the evaluation was somewhat limited as a result.  He was seen by Urology, and they did not feel patient's pain was attributable to anything going on in the urinary tract.  In the  meantime, his CT did not show evidence of diverticulitis.  He was continued on IV fluids, pain medication and antiemetics while awaiting GI consultation.  GI recommended outpatient colonoscopy as symptoms seemed referable to the sigmoid colon.  At discharge patient's pain had improved and he was tolerating a diet.     Consults:   Consults         Status Ordering Provider     Inpatient consult to Gastroenterology  Once     Provider:  Letty Powell MD    Completed DAVONTE ENGLAND     Inpatient consult to Urology  Once     Provider:  Fabian Bennett MD    Completed DAVONTE ENGLAND            Final Active Diagnoses:    Diagnosis Date Noted POA    PRINCIPAL PROBLEM:  Abdominal pain [R10.9] 02/26/2018 Yes    Dehydration, mild [E86.0] 02/27/2018 Yes    Nausea and vomiting [R11.2] 02/26/2018 Yes    Left nephrolithiasis [N20.0] 02/26/2018 Yes      Problems Resolved During this Admission:    Diagnosis Date Noted Date Resolved POA       Discharged Condition: stable    Disposition: Home or Self Care    Follow Up:  Follow-up Information     Kelvin Pablo DO.    Specialty:  Internal Medicine  Why:  Follow up in 1-2 weeks  Contact information:  [address] [facility] [address]  [phone]             Casimiro Noland MD.    Specialty:  Gastroenterology  Why:  Follow up for the next available appointment to schedule colonoscopy  Contact information:  [facility] [address] [id number] [phone]                 Patient Instructions:     Diet Adult Regular     Activity as tolerated       Medications:  Reconciled Home Medications:   Discharge Medication List as of 2/28/2018 11:33 AM      CONTINUE these medications which have NOT CHANGED    Details   dicyclomine (BENTYL) 20 mg tablet Take 1 tablet (20 mg total) by mouth 2 (two) times daily., Starting Sun 2/25/2018, Until Tue 3/27/2018, Print      hydrocodone-acetaminophen 5-325mg (NORCO) 5-325 mg per tablet Take 2 tablets by mouth  every 6 (six) hours as needed for Pain., Starting Sun 2/25/2018, Print         STOP taking these medications       ciprofloxacin HCl (CIPRO) 500 MG tablet Comments:   Reason for Stopping:             Time spent on the discharge of patient: <30 minutes  Patient was seen and examined on the date of discharge and determined to be suitable for discharge.         Adri Smith MD  Department of Hospital Medicine  [facility]

## 2018-03-06 ENCOUNTER — OFFICE VISIT (OUTPATIENT)
Dept: INTERNAL MEDICINE | Facility: CLINIC | Age: 43
End: 2018-03-06
Payer: COMMERCIAL

## 2018-03-06 ENCOUNTER — HOSPITAL ENCOUNTER (EMERGENCY)
Facility: OTHER | Age: 43
Discharge: HOME | End: 2018-03-06
Attending: EMERGENCY MEDICINE
Payer: COMMERCIAL

## 2018-03-06 ENCOUNTER — TELEPHONE (OUTPATIENT)
Dept: INTERNAL MEDICINE | Facility: CLINIC | Age: 43
End: 2018-03-06

## 2018-03-06 VITALS
HEART RATE: 73 BPM | SYSTOLIC BLOOD PRESSURE: 126 MMHG | WEIGHT: 217.81 LBS | TEMPERATURE: 98 F | DIASTOLIC BLOOD PRESSURE: 79 MMHG | HEIGHT: 73 IN | BODY MASS INDEX: 28.87 KG/M2 | RESPIRATION RATE: 16 BRPM

## 2018-03-06 VITALS
TEMPERATURE: 99 F | DIASTOLIC BLOOD PRESSURE: 70 MMHG | BODY MASS INDEX: 28.63 KG/M2 | RESPIRATION RATE: 17 BRPM | HEIGHT: 73 IN | OXYGEN SATURATION: 100 % | SYSTOLIC BLOOD PRESSURE: 137 MMHG | WEIGHT: 216 LBS | HEART RATE: 71 BPM

## 2018-03-06 DIAGNOSIS — N20.0 NEPHROLITHIASIS: ICD-10-CM

## 2018-03-06 DIAGNOSIS — R10.32 INTERMITTENT LEFT LOWER QUADRANT ABDOMINAL PAIN: Primary | ICD-10-CM

## 2018-03-06 DIAGNOSIS — R10.84 GENERALIZED ABDOMINAL PAIN: Primary | ICD-10-CM

## 2018-03-06 DIAGNOSIS — G89.29 CHRONIC RIGHT-SIDED THORACIC BACK PAIN: ICD-10-CM

## 2018-03-06 DIAGNOSIS — M54.6 CHRONIC RIGHT-SIDED THORACIC BACK PAIN: ICD-10-CM

## 2018-03-06 LAB
ALBUMIN SERPL BCP-MCNC: 4.2 G/DL
ALP SERPL-CCNC: 70 U/L
ALT SERPL W/O P-5'-P-CCNC: 21 U/L
ANION GAP SERPL CALC-SCNC: 10 MMOL/L
AST SERPL-CCNC: 20 U/L
BASOPHILS # BLD AUTO: 0.01 K/UL
BASOPHILS NFR BLD: 0.1 %
BILIRUB SERPL-MCNC: 0.4 MG/DL
BILIRUB UR QL STRIP: NEGATIVE
BUN SERPL-MCNC: 13 MG/DL
CALCIUM SERPL-MCNC: 9.9 MG/DL
CHLORIDE SERPL-SCNC: 105 MMOL/L
CLARITY UR: CLEAR
CO2 SERPL-SCNC: 23 MMOL/L
COLOR UR: YELLOW
CREAT SERPL-MCNC: 1.2 MG/DL
DIFFERENTIAL METHOD: ABNORMAL
EOSINOPHIL # BLD AUTO: 0.1 K/UL
EOSINOPHIL NFR BLD: 0.7 %
ERYTHROCYTE [DISTWIDTH] IN BLOOD BY AUTOMATED COUNT: 13.9 %
EST. GFR  (AFRICAN AMERICAN): >60 ML/MIN/1.73 M^2
EST. GFR  (NON AFRICAN AMERICAN): >60 ML/MIN/1.73 M^2
GLUCOSE SERPL-MCNC: 119 MG/DL
GLUCOSE UR QL STRIP: NEGATIVE
HCT VFR BLD AUTO: 40.8 %
HGB BLD-MCNC: 14.4 G/DL
HGB UR QL STRIP: NEGATIVE
KETONES UR QL STRIP: NEGATIVE
LEUKOCYTE ESTERASE UR QL STRIP: NEGATIVE
LIPASE SERPL-CCNC: 9 U/L
LYMPHOCYTES # BLD AUTO: 1.2 K/UL
LYMPHOCYTES NFR BLD: 17.6 %
MCH RBC QN AUTO: 29.4 PG
MCHC RBC AUTO-ENTMCNC: 35.3 G/DL
MCV RBC AUTO: 83 FL
MONOCYTES # BLD AUTO: 0.6 K/UL
MONOCYTES NFR BLD: 8.3 %
NEUTROPHILS # BLD AUTO: 5.1 K/UL
NEUTROPHILS NFR BLD: 72.7 %
NITRITE UR QL STRIP: NEGATIVE
PH UR STRIP: 8.5 [PH] (ref 5–8)
PLATELET # BLD AUTO: 235 K/UL
PMV BLD AUTO: 9.3 FL
POTASSIUM SERPL-SCNC: 4.1 MMOL/L
PROT SERPL-MCNC: 7.8 G/DL
PROT UR QL STRIP: NEGATIVE
RBC # BLD AUTO: 4.89 M/UL
SODIUM SERPL-SCNC: 138 MMOL/L
SP GR UR STRIP: 1.02 (ref 1–1.03)
URN SPEC COLLECT METH UR: NORMAL
UROBILINOGEN UR STRIP-ACNC: NEGATIVE EU/DL
WBC # BLD AUTO: 7.03 K/UL

## 2018-03-06 PROCEDURE — 25000003 PHARM REV CODE 250: Performed by: EMERGENCY MEDICINE

## 2018-03-06 PROCEDURE — 96372 THER/PROPH/DIAG INJ SC/IM: CPT

## 2018-03-06 PROCEDURE — 99284 EMERGENCY DEPT VISIT MOD MDM: CPT | Mod: 25

## 2018-03-06 PROCEDURE — 96374 THER/PROPH/DIAG INJ IV PUSH: CPT

## 2018-03-06 PROCEDURE — 81003 URINALYSIS AUTO W/O SCOPE: CPT

## 2018-03-06 PROCEDURE — 96375 TX/PRO/DX INJ NEW DRUG ADDON: CPT

## 2018-03-06 PROCEDURE — 99999 PR PBB SHADOW E&M-EST. PATIENT-LVL III: CPT | Mod: PBBFAC,,, | Performed by: INTERNAL MEDICINE

## 2018-03-06 PROCEDURE — 99215 OFFICE O/P EST HI 40 MIN: CPT | Mod: S$GLB,,, | Performed by: INTERNAL MEDICINE

## 2018-03-06 PROCEDURE — 80053 COMPREHEN METABOLIC PANEL: CPT

## 2018-03-06 PROCEDURE — 83690 ASSAY OF LIPASE: CPT

## 2018-03-06 PROCEDURE — 85025 COMPLETE CBC W/AUTO DIFF WBC: CPT

## 2018-03-06 PROCEDURE — 96361 HYDRATE IV INFUSION ADD-ON: CPT

## 2018-03-06 PROCEDURE — 63600175 PHARM REV CODE 636 W HCPCS: Performed by: EMERGENCY MEDICINE

## 2018-03-06 PROCEDURE — 25500020 PHARM REV CODE 255: Performed by: EMERGENCY MEDICINE

## 2018-03-06 RX ORDER — ONDANSETRON 2 MG/ML
4 INJECTION INTRAMUSCULAR; INTRAVENOUS ONCE
Status: COMPLETED | OUTPATIENT
Start: 2018-03-06 | End: 2018-03-06

## 2018-03-06 RX ORDER — DICYCLOMINE HYDROCHLORIDE 10 MG/ML
20 INJECTION INTRAMUSCULAR
Status: COMPLETED | OUTPATIENT
Start: 2018-03-06 | End: 2018-03-06

## 2018-03-06 RX ORDER — OXYCODONE AND ACETAMINOPHEN 10; 325 MG/1; MG/1
1 TABLET ORAL ONCE
Status: COMPLETED | OUTPATIENT
Start: 2018-03-06 | End: 2018-03-06

## 2018-03-06 RX ORDER — ONDANSETRON 4 MG/1
4 TABLET, ORALLY DISINTEGRATING ORAL EVERY 8 HOURS PRN
Qty: 12 TABLET | Refills: 0 | Status: SHIPPED | OUTPATIENT
Start: 2018-03-06 | End: 2018-03-08

## 2018-03-06 RX ORDER — PROMETHAZINE HYDROCHLORIDE 25 MG/1
25 TABLET ORAL
Status: COMPLETED | OUTPATIENT
Start: 2018-03-06 | End: 2018-03-06

## 2018-03-06 RX ORDER — HALOPERIDOL 5 MG/ML
5 INJECTION INTRAMUSCULAR
Status: COMPLETED | OUTPATIENT
Start: 2018-03-06 | End: 2018-03-06

## 2018-03-06 RX ORDER — OMEPRAZOLE 40 MG/1
40 CAPSULE, DELAYED RELEASE ORAL DAILY
Qty: 14 CAPSULE | Refills: 0 | Status: SHIPPED | OUTPATIENT
Start: 2018-03-06 | End: 2021-08-10

## 2018-03-06 RX ADMIN — DICYCLOMINE HYDROCHLORIDE 20 MG: 10 INJECTION INTRAMUSCULAR at 03:03

## 2018-03-06 RX ADMIN — IOHEXOL 100 ML: 350 INJECTION, SOLUTION INTRAVENOUS at 05:03

## 2018-03-06 RX ADMIN — SODIUM CHLORIDE 1000 ML: 0.9 INJECTION, SOLUTION INTRAVENOUS at 03:03

## 2018-03-06 RX ADMIN — ONDANSETRON 4 MG: 2 INJECTION INTRAMUSCULAR; INTRAVENOUS at 03:03

## 2018-03-06 RX ADMIN — HALOPERIDOL LACTATE 5 MG: 5 INJECTION, SOLUTION INTRAMUSCULAR at 05:03

## 2018-03-06 RX ADMIN — OXYCODONE HYDROCHLORIDE AND ACETAMINOPHEN 1 TABLET: 10; 325 TABLET ORAL at 04:03

## 2018-03-06 RX ADMIN — PROMETHAZINE HYDROCHLORIDE 25 MG: 25 TABLET ORAL at 04:03

## 2018-03-06 NOTE — ED NOTES
Patient presents to the ED with a c/o of generalized abdominal pain. Pt states that pain has been going on since last week, states that he came to the Ed and has not had any comfort since. Pt denies any trouble with bowel movements, but endorses nausea. Pt states that pain is 10/10. Pt tender throughout abdomen, bowel sounds active, pt denies any trouble urinating. Lab work drawn, patient given bently Im, patient awake alert and oriented.

## 2018-03-06 NOTE — ED NOTES
Complains of pain across abdomen.  Actively vomiting.  Was followed up today after discharge from hospital.  Read over notes with nothing to add from me

## 2018-03-06 NOTE — TELEPHONE ENCOUNTER
----- Message from Chela Randolph sent at 3/6/2018  1:11 PM CST -----  Contact: Pt [phone]  Pt would like a call back from the nurse. He was seen this morning and states that he is in a lot of pain.

## 2018-03-06 NOTE — TELEPHONE ENCOUNTER
You saw this am.  Looks like in er now.  fyi      He is in pain, I told him to complete visit to er for treatment.

## 2018-03-06 NOTE — PROGRESS NOTES
Subjective:       Patient ID: Hardik Jimenez is a 42 y.o. male.    Chief Complaint: Follow-up    HPI   42 year old man who is here for hospital f/u from 2/26/18-2/28/18. He presented with 4 days of left flank pain, nausea and vomiting.  He has not had fever or diarrhea.  He was seen in the ED the previous day and had a CT of the abdomen that showed left sided nonobstructing nephrolithiasis.  CT repeated with contrast on the current presentation showed nonspecific mucosal enhancement of the left and sigmoid colon, with differential of infectious or inflammatory colitis without evidence of perforation or abscess. Patient felt his symptoms were typical of his previous episode of passing kidney stones, about 10 years ago.  Labs in ED showed a UA with a trace of ketones but no blood or leukocytes.  CBC was at baseline and chemistry was normal. He was started on a clear liquid diet and IV fluids and admitted for further workup. Patient was admitted and urology was consulted given the previous history of kidney stones.  In addition a scrotal ultrasound was done as he complained of left flank pain with radiation to the left groin.  No etiology was found, although he was unable to lie still and the evaluation was somewhat limited as a result.  He was seen by Urology, and they did not feel patient's pain was attributable to anything going on in the urinary tract.  In the meantime, his CT did not show evidence of diverticulitis.  He was continued on IV fluids, pain medication and antiemetics while awaiting GI consultation.  GI recommended outpatient colonoscopy as symptoms seemed referable to the sigmoid colon.  At discharge patient's pain had improved and he was tolerating a diet. Pt's abdominal pain has essentially resolved by now. No fevers/chills, diarrhea/constipation.     He is still having right sided upper lumbar/thoracic back pain described as a dull ache without radiation which has been present and intermittent over  the last 7 months. X-ray done at onset was normal. He has not been taking the NSAIDs on a regular basis.   Review of Systems   Constitutional: Negative for activity change, appetite change, chills, diaphoresis, fatigue, fever and unexpected weight change.   HENT: Negative for congestion, mouth sores, postnasal drip, rhinorrhea, sinus pressure, sneezing, sore throat, trouble swallowing and voice change.    Eyes: Negative for discharge, itching and visual disturbance.   Respiratory: Negative for cough, chest tightness, shortness of breath and wheezing.    Cardiovascular: Negative for chest pain, palpitations and leg swelling.   Gastrointestinal: Negative for abdominal pain, blood in stool, constipation, diarrhea, nausea and vomiting.   Endocrine: Negative for cold intolerance and heat intolerance.   Genitourinary: Negative for difficulty urinating, dysuria, flank pain, hematuria and urgency.   Musculoskeletal: Positive for back pain and myalgias. Negative for arthralgias and neck pain.   Skin: Negative for rash and wound.   Allergic/Immunologic: Negative for environmental allergies and food allergies.   Neurological: Negative for dizziness, tremors, seizures, syncope, weakness and headaches.   Hematological: Negative for adenopathy. Does not bruise/bleed easily.   Psychiatric/Behavioral: Negative for confusion, sleep disturbance and suicidal ideas. The patient is not nervous/anxious.        Objective:      Physical Exam   Constitutional: He is oriented to person, place, and time. He appears well-developed and well-nourished. No distress.   HENT:   Head: Normocephalic and atraumatic.   Right Ear: External ear normal.   Left Ear: External ear normal.   Nose: Nose normal.   Mouth/Throat: Oropharynx is clear and moist. No oropharyngeal exudate.   Eyes: Conjunctivae and EOM are normal. Pupils are equal, round, and reactive to light. Right eye exhibits no discharge. Left eye exhibits no discharge. No scleral icterus.    Neck: Normal range of motion. Neck supple. No JVD present. No thyromegaly present.   Cardiovascular: Normal rate, regular rhythm, normal heart sounds and intact distal pulses.    No murmur heard.  Pulmonary/Chest: Effort normal and breath sounds normal. No respiratory distress. He has no wheezes. He has no rales.   Abdominal: Soft. Bowel sounds are normal. He exhibits no distension. There is no tenderness. There is no guarding.   Musculoskeletal: He exhibits no edema.   Lymphadenopathy:     He has no cervical adenopathy.   Neurological: He is alert and oriented to person, place, and time. No cranial nerve deficit. Coordination normal.   Skin: Skin is warm and dry. No rash noted. He is not diaphoretic. No pallor.   Psychiatric: He has a normal mood and affect. Judgment normal.       Assessment:       1. Intermittent left lower quadrant abdominal pain    2. Chronic right-sided thoracic back pain    3. Nephrolithiasis        Plan:    1. Resolving, Pt will schedule colonoscopy for further evaluation   2. Referral to PM&R for evaluation    3. Stable      Over 1/2 of 40 minute visit spent reviewing pt's medical records, education/discussion of pt's medical conditions and medical management

## 2018-03-06 NOTE — ED PROVIDER NOTES
"Encounter Date: 3/6/2018    SCRIBE #1 NOTE: I, Luis Contreras, am scribing for, and in the presence of, Dr. Santizo.       History     Chief Complaint   Patient presents with    Abdominal Pain     seen by PCP today and scheduled pain mgmt appt april 4. Pt c/o "burning" generalized abd pain with nausea. Pain unrelieved from norco.      2:55 PM    Patient is a 42 y.o. male who presents to the ED with complaint of abdominal pain. He reports onset of symptoms today. Pain is generalized and described as "burning." He reports associated vomiting, subjective fever, and sweating. He denies diarrhea, constipation, or blood in vomit. He is still passing gas. He states current pain is "the same pain" that he had one week ago when he was seen here and admitted. He reports that no cause of the pain was identified at that time.  Denies urinary symptoms.  Denies chest pain/SOB.        The history is provided by the patient.     Review of patient's allergies indicates:  No Known Allergies  Past Medical History:   Diagnosis Date    Asthma     Renal disorder     Kidney stones     History reviewed. No pertinent surgical history.  No family history on file.  Social History   Substance Use Topics    Smoking status: Never Smoker    Smokeless tobacco: Never Used    Alcohol use Yes      Comment: occasionally.     Review of Systems   Constitutional: Positive for diaphoresis and fever (subjective).   HENT: Negative for congestion.    Respiratory: Negative for cough and shortness of breath.    Cardiovascular: Negative for chest pain.   Gastrointestinal: Positive for abdominal pain and vomiting. Negative for blood in stool (or vomit), constipation and diarrhea.   Genitourinary: Negative for dysuria and hematuria.   Musculoskeletal: Negative for back pain.   Skin: Negative for rash.   Neurological: Negative for dizziness and headaches.       Physical Exam     Initial Vitals [03/06/18 1415]   BP Pulse Resp Temp SpO2   136/86 74 14 97.5 °F " (36.4 °C) 100 %      MAP       102.67         Physical Exam    Nursing note and vitals reviewed.  Constitutional: He appears well-developed and well-nourished. No distress.   HENT:   Head: Normocephalic and atraumatic.   Mouth/Throat: Oropharynx is clear and moist.   Eyes: Conjunctivae and EOM are normal.   Neck: Normal range of motion. Neck supple.   Cardiovascular: Normal rate, regular rhythm and normal heart sounds.   Pulmonary/Chest: Breath sounds normal. No respiratory distress. He has no wheezes. He has no rales.   Abdominal: Soft. Bowel sounds are normal. He exhibits no distension. There is tenderness (mild diffuse tenderness). There is no rebound and no guarding.   Musculoskeletal: Normal range of motion.   Neurological: He is alert and oriented to person, place, and time.   Ambulatory with steady gait.     Skin: Skin is warm and dry.   Psychiatric: He has a normal mood and affect. His behavior is normal. Judgment and thought content normal.         ED Course   Procedures  Labs Reviewed   COMPREHENSIVE METABOLIC PANEL - Abnormal; Notable for the following:        Result Value    Glucose 119 (*)     All other components within normal limits   CBC W/ AUTO DIFFERENTIAL - Abnormal; Notable for the following:     Lymph% 17.6 (*)     All other components within normal limits   LIPASE   URINALYSIS             Medical Decision Making:   History:   Old Medical Records: I decided to obtain old medical records.  Old Records Summarized: records from previous admission(s) and other records.  Initial Assessment:   2:55PM:  Pt is a 41 y/o M who presents to ED with abdominal pain, N/V. Pt appears well, nontoxic. His abdomen is soft, though mild discomfort to palpation.  Will plan for labs, imaging, will continue to follow and reassess.    Clinical Tests:   Lab Tests: Ordered and Reviewed  Radiological Study: Ordered and Reviewed    7:00 PM:  Pt doing well, tolerating ice chips with no issues.   He is feeling better.  His  labs and imaging are unremarkable.  I updated pt regarding results.  Will plan to provide with GI referral for further evaluation and management.  I counseled pt regarding supportive care measures.  I have discussed with the pt ED return warnings and need for close PCP f/u.  Pt agreeable to plan and all questions answered.  I feel that pt is stable for discharge and management as an outpatient and no further intervention is needed at this time.  Pt is comfortable returning to the ED if needed.  Will DC home in stable condition.                Scribe Attestation:   Scribe #1: I performed the above scribed service and the documentation accurately describes the services I performed. I attest to the accuracy of the note.    Attending Attestation:           Physician Attestation for Scribe:  Physician Attestation Statement for Scribe #1: I, Dr. Santizo, reviewed documentation, as scribed by Luis Contreras in my presence, and it is both accurate and complete.                    Clinical Impression:     1. Generalized abdominal pain                              Jacquelin Santizo MD  03/06/18 2024

## 2018-03-07 NOTE — DISCHARGE INSTRUCTIONS
We have provided you with medication for your pain and nausea.  Please fill and take as directed    Please return to the ER if you have chest pain, difficulty breathing, fevers, altered mental status, dizziness, weakness, or any other concerns.      We have provided you with a GI doctor to follow up with.  Call to make an appointment and let them know you were seen in the Emergency Department and provided with a referral.

## 2018-03-08 ENCOUNTER — HOSPITAL ENCOUNTER (EMERGENCY)
Facility: HOSPITAL | Age: 43
Discharge: HOME | End: 2018-03-08
Attending: EMERGENCY MEDICINE
Payer: COMMERCIAL

## 2018-03-08 ENCOUNTER — OFFICE VISIT (OUTPATIENT)
Dept: INTERNAL MEDICINE | Facility: CLINIC | Age: 43
End: 2018-03-08
Payer: COMMERCIAL

## 2018-03-08 VITALS
HEART RATE: 65 BPM | HEIGHT: 73 IN | WEIGHT: 216 LBS | SYSTOLIC BLOOD PRESSURE: 144 MMHG | BODY MASS INDEX: 28.63 KG/M2 | RESPIRATION RATE: 20 BRPM | DIASTOLIC BLOOD PRESSURE: 89 MMHG | TEMPERATURE: 98 F | OXYGEN SATURATION: 100 %

## 2018-03-08 VITALS
TEMPERATURE: 99 F | DIASTOLIC BLOOD PRESSURE: 88 MMHG | HEIGHT: 72 IN | SYSTOLIC BLOOD PRESSURE: 138 MMHG | BODY MASS INDEX: 29.17 KG/M2 | HEART RATE: 65 BPM | RESPIRATION RATE: 20 BRPM | WEIGHT: 215.38 LBS

## 2018-03-08 DIAGNOSIS — R10.9 ABDOMINAL PAIN, UNSPECIFIED ABDOMINAL LOCATION: Primary | ICD-10-CM

## 2018-03-08 DIAGNOSIS — K52.9 ENTEROCOLITIS: ICD-10-CM

## 2018-03-08 DIAGNOSIS — R11.2 NAUSEA AND VOMITING, INTRACTABILITY OF VOMITING NOT SPECIFIED, UNSPECIFIED VOMITING TYPE: ICD-10-CM

## 2018-03-08 DIAGNOSIS — R10.30 LOWER ABDOMINAL PAIN: Primary | ICD-10-CM

## 2018-03-08 LAB
ALBUMIN SERPL BCP-MCNC: 4.3 G/DL
ALP SERPL-CCNC: 76 U/L
ALT SERPL W/O P-5'-P-CCNC: 28 U/L
ANION GAP SERPL CALC-SCNC: 10 MMOL/L
AST SERPL-CCNC: 36 U/L
BASOPHILS # BLD AUTO: 0.01 K/UL
BASOPHILS NFR BLD: 0.2 %
BILIRUB SERPL-MCNC: 0.7 MG/DL
BILIRUB UR QL STRIP: NEGATIVE
BUN SERPL-MCNC: 13 MG/DL
CALCIUM SERPL-MCNC: 9.8 MG/DL
CHLORIDE SERPL-SCNC: 104 MMOL/L
CLARITY UR: CLEAR
CO2 SERPL-SCNC: 22 MMOL/L
COLOR UR: YELLOW
CREAT SERPL-MCNC: 1.4 MG/DL
DIFFERENTIAL METHOD: ABNORMAL
EOSINOPHIL # BLD AUTO: 0 K/UL
EOSINOPHIL NFR BLD: 0.2 %
ERYTHROCYTE [DISTWIDTH] IN BLOOD BY AUTOMATED COUNT: 13.9 %
EST. GFR  (AFRICAN AMERICAN): >60 ML/MIN/1.73 M^2
EST. GFR  (NON AFRICAN AMERICAN): >60 ML/MIN/1.73 M^2
GLUCOSE SERPL-MCNC: 111 MG/DL
GLUCOSE UR QL STRIP: NEGATIVE
HCT VFR BLD AUTO: 42.3 %
HGB BLD-MCNC: 15.1 G/DL
HGB UR QL STRIP: NEGATIVE
KETONES UR QL STRIP: NEGATIVE
LEUKOCYTE ESTERASE UR QL STRIP: NEGATIVE
LIPASE SERPL-CCNC: 32 U/L
LYMPHOCYTES # BLD AUTO: 1.1 K/UL
LYMPHOCYTES NFR BLD: 16.6 %
MCH RBC QN AUTO: 29.8 PG
MCHC RBC AUTO-ENTMCNC: 35.7 G/DL
MCV RBC AUTO: 83 FL
MONOCYTES # BLD AUTO: 0.7 K/UL
MONOCYTES NFR BLD: 10.4 %
NEUTROPHILS # BLD AUTO: 4.6 K/UL
NEUTROPHILS NFR BLD: 72 %
NITRITE UR QL STRIP: NEGATIVE
PH UR STRIP: 7 [PH] (ref 5–8)
PLATELET # BLD AUTO: 251 K/UL
PMV BLD AUTO: 9.2 FL
POTASSIUM SERPL-SCNC: 4.2 MMOL/L
PROT SERPL-MCNC: 8 G/DL
PROT UR QL STRIP: NEGATIVE
RBC # BLD AUTO: 5.07 M/UL
SODIUM SERPL-SCNC: 136 MMOL/L
SP GR UR STRIP: 1.01 (ref 1–1.03)
URN SPEC COLLECT METH UR: NORMAL
UROBILINOGEN UR STRIP-ACNC: NEGATIVE EU/DL
WBC # BLD AUTO: 6.34 K/UL

## 2018-03-08 PROCEDURE — 80053 COMPREHEN METABOLIC PANEL: CPT

## 2018-03-08 PROCEDURE — 63600175 PHARM REV CODE 636 W HCPCS: Performed by: EMERGENCY MEDICINE

## 2018-03-08 PROCEDURE — 96375 TX/PRO/DX INJ NEW DRUG ADDON: CPT

## 2018-03-08 PROCEDURE — 81003 URINALYSIS AUTO W/O SCOPE: CPT

## 2018-03-08 PROCEDURE — 99214 OFFICE O/P EST MOD 30 MIN: CPT | Mod: 25,S$GLB,, | Performed by: INTERNAL MEDICINE

## 2018-03-08 PROCEDURE — 96372 THER/PROPH/DIAG INJ SC/IM: CPT | Mod: S$GLB,,, | Performed by: INTERNAL MEDICINE

## 2018-03-08 PROCEDURE — 99284 EMERGENCY DEPT VISIT MOD MDM: CPT | Mod: 25

## 2018-03-08 PROCEDURE — 85025 COMPLETE CBC W/AUTO DIFF WBC: CPT

## 2018-03-08 PROCEDURE — 99999 PR PBB SHADOW E&M-EST. PATIENT-LVL III: CPT | Mod: PBBFAC,,, | Performed by: INTERNAL MEDICINE

## 2018-03-08 PROCEDURE — 96361 HYDRATE IV INFUSION ADD-ON: CPT

## 2018-03-08 PROCEDURE — 96376 TX/PRO/DX INJ SAME DRUG ADON: CPT

## 2018-03-08 PROCEDURE — 83690 ASSAY OF LIPASE: CPT

## 2018-03-08 PROCEDURE — 96374 THER/PROPH/DIAG INJ IV PUSH: CPT

## 2018-03-08 PROCEDURE — 25000003 PHARM REV CODE 250: Performed by: EMERGENCY MEDICINE

## 2018-03-08 RX ORDER — PROMETHAZINE HYDROCHLORIDE 25 MG/ML
25 INJECTION, SOLUTION INTRAMUSCULAR; INTRAVENOUS
Status: COMPLETED | OUTPATIENT
Start: 2018-03-08 | End: 2018-03-08

## 2018-03-08 RX ORDER — OXYCODONE AND ACETAMINOPHEN 5; 325 MG/1; MG/1
1 TABLET ORAL EVERY 4 HOURS PRN
Qty: 20 TABLET | Refills: 0 | Status: SHIPPED | OUTPATIENT
Start: 2018-03-08 | End: 2021-01-06

## 2018-03-08 RX ORDER — ONDANSETRON 2 MG/ML
INJECTION INTRAMUSCULAR; INTRAVENOUS
Status: DISPENSED
Start: 2018-03-08 | End: 2018-03-09

## 2018-03-08 RX ORDER — ONDANSETRON HYDROCHLORIDE 8 MG/1
8 TABLET, FILM COATED ORAL EVERY 8 HOURS PRN
Qty: 20 TABLET | Refills: 0 | Status: SHIPPED | OUTPATIENT
Start: 2018-03-08 | End: 2018-03-10

## 2018-03-08 RX ORDER — DICYCLOMINE HYDROCHLORIDE 20 MG/1
20 TABLET ORAL 3 TIMES DAILY PRN
Qty: 45 TABLET | Refills: 0 | Status: SHIPPED | OUTPATIENT
Start: 2018-03-08 | End: 2018-03-10

## 2018-03-08 RX ORDER — METRONIDAZOLE 500 MG/1
500 TABLET ORAL 3 TIMES DAILY
Qty: 30 TABLET | Refills: 0 | Status: SHIPPED | OUTPATIENT
Start: 2018-03-08 | End: 2018-03-18

## 2018-03-08 RX ORDER — ONDANSETRON 2 MG/ML
8 INJECTION INTRAMUSCULAR; INTRAVENOUS
Status: COMPLETED | OUTPATIENT
Start: 2018-03-08 | End: 2018-03-08

## 2018-03-08 RX ORDER — KETOROLAC TROMETHAMINE 30 MG/ML
60 INJECTION, SOLUTION INTRAMUSCULAR; INTRAVENOUS
Status: COMPLETED | OUTPATIENT
Start: 2018-03-08 | End: 2018-03-08

## 2018-03-08 RX ORDER — DICYCLOMINE HYDROCHLORIDE 10 MG/ML
20 INJECTION INTRAMUSCULAR
Status: COMPLETED | OUTPATIENT
Start: 2018-03-08 | End: 2018-03-08

## 2018-03-08 RX ORDER — CIPROFLOXACIN 500 MG/1
500 TABLET ORAL 2 TIMES DAILY
Qty: 20 TABLET | Refills: 0 | Status: SHIPPED | OUTPATIENT
Start: 2018-03-08 | End: 2018-03-18

## 2018-03-08 RX ORDER — MORPHINE SULFATE 2 MG/ML
6 INJECTION, SOLUTION INTRAMUSCULAR; INTRAVENOUS
Status: COMPLETED | OUTPATIENT
Start: 2018-03-08 | End: 2018-03-08

## 2018-03-08 RX ORDER — DICYCLOMINE HYDROCHLORIDE 10 MG/ML
INJECTION INTRAMUSCULAR
Status: DISPENSED
Start: 2018-03-08 | End: 2018-03-09

## 2018-03-08 RX ORDER — MORPHINE SULFATE 2 MG/ML
4 INJECTION, SOLUTION INTRAMUSCULAR; INTRAVENOUS
Status: COMPLETED | OUTPATIENT
Start: 2018-03-08 | End: 2018-03-08

## 2018-03-08 RX ORDER — MORPHINE SULFATE 2 MG/ML
INJECTION, SOLUTION INTRAMUSCULAR; INTRAVENOUS
Status: DISPENSED
Start: 2018-03-08 | End: 2018-03-09

## 2018-03-08 RX ORDER — MORPHINE SULFATE 4 MG/ML
INJECTION, SOLUTION INTRAMUSCULAR; INTRAVENOUS
Status: DISPENSED
Start: 2018-03-08 | End: 2018-03-09

## 2018-03-08 RX ADMIN — DICYCLOMINE HYDROCHLORIDE 20 MG: 20 INJECTION, SOLUTION INTRAMUSCULAR at 12:03

## 2018-03-08 RX ADMIN — KETOROLAC TROMETHAMINE 60 MG: 30 INJECTION, SOLUTION INTRAMUSCULAR; INTRAVENOUS at 10:03

## 2018-03-08 RX ADMIN — Medication 6 MG: at 02:03

## 2018-03-08 RX ADMIN — SODIUM CHLORIDE 1000 ML: 0.9 INJECTION, SOLUTION INTRAVENOUS at 12:03

## 2018-03-08 RX ADMIN — ONDANSETRON 8 MG: 2 INJECTION, SOLUTION INTRAMUSCULAR; INTRAVENOUS at 12:03

## 2018-03-08 RX ADMIN — MORPHINE SULFATE 4 MG: 2 INJECTION, SOLUTION INTRAMUSCULAR; INTRAVENOUS at 12:03

## 2018-03-08 RX ADMIN — PROMETHAZINE HYDROCHLORIDE 25 MG: 25 INJECTION, SOLUTION INTRAMUSCULAR; INTRAVENOUS at 10:03

## 2018-03-08 NOTE — DISCHARGE INSTRUCTIONS
Please follow-up with Dr. Rios on Monday. Please fill prescriptions that Dr. Pablo prescribed for you. Return to ED if symptoms worsen or change, such as increased pain of if you are unable to keep fluids down.

## 2018-03-08 NOTE — ED PROVIDER NOTES
"Encounter Date: 3/8/2018    SCRIBE #1 NOTE: I, Kelvin Palomino, am scribing for, and in the presence of,  Dr. Umanzor. I have scribed the following portions of the note - Other sections scribed: ED Management.       History     Chief Complaint   Patient presents with    Abdominal Pain     Bilateral lower quadrant abdominal pain x3 days. Reports nausea and vomiting (4-5 times prior to arrival). Was seen by PCP and was instructed to come to emergency room.      Pt is a 42-year-old male with pmhx of kidney stones and asthma who presents today with worsening lower abdominal pain with N/V since this am. States that he vomited about 3-4 times today. He describes the pain as "burning." Pt seen at [facility] on 3/6/[facility] for abdominal pain with vomiting, CT scan revealed two non-obstructing kidney stones and colitis.  Pt had f/u with PCP Dr. Pablo today, was given a shot of phenergan and toradol, and given prescriptions for percocet, cipro, flagyl, and zofran, and a referral for gastroenterology. States that his pain was not any better so he decided to come here. Pt's last BM was this am, he denies tarry, dark stools. Pt denies any previous abdominal surgeries. Denies fever, chills, diarrhea, SOB, chest pain, dysuria, and hematuria. Pt denies any other complaints at this time.      The history is provided by the patient.   Abdominal Pain   The current episode started today. The problem has not changed since onset.The abdominal pain is located in the RLQ and LLQ. The abdominal pain does not radiate. The severity of the abdominal pain is 10/10. The abdominal pain is relieved by movement and certain positions. The other symptoms of the illness include nausea and vomiting. The other symptoms of the illness do not include fever, fatigue, melena, shortness of breath, diarrhea or dysuria.   The vomiting began today. Vomiting occurs 2 to 5 times per day.   The patient has not had a change in bowel habit. Symptoms associated with the illness " do not include chills, anorexia, constipation, hematuria or back pain. Significant associated medical issues do not include inflammatory bowel disease or diverticulitis.     Review of patient's allergies indicates:  No Known Allergies  Past Medical History:   Diagnosis Date    Asthma     Renal disorder     Kidney stones     No past surgical history on file.  No family history on file.  Social History   Substance Use Topics    Smoking status: Never Smoker    Smokeless tobacco: Never Used    Alcohol use Yes      Comment: occasionally.     Review of Systems   Constitutional: Negative for chills, fatigue and fever.   Respiratory: Negative for cough, chest tightness and shortness of breath.    Cardiovascular: Negative for chest pain, palpitations and leg swelling.   Gastrointestinal: Positive for abdominal pain, nausea and vomiting. Negative for abdominal distention, anorexia, constipation, diarrhea and melena.   Genitourinary: Negative for dysuria and hematuria.   Musculoskeletal: Negative for back pain.   Skin: Negative for rash.   Neurological: Negative for dizziness, weakness, light-headedness, numbness and headaches.   Hematological: Does not bruise/bleed easily.       Physical Exam     Initial Vitals [03/08/18 1142]   BP Pulse Resp Temp SpO2   137/86 67 20 98 °F (36.7 °C) 99 %      MAP       103         Physical Exam    Nursing note and vitals reviewed.  Constitutional: Vital signs are normal. He appears well-developed. He is not diaphoretic.  Non-toxic appearance. He does not have a sickly appearance.   HENT:   Head: Normocephalic.   Eyes: Conjunctivae and lids are normal. Pupils are equal, round, and reactive to light.   Neck: Trachea normal, normal range of motion and phonation normal. Neck supple. No neck rigidity.   Cardiovascular: Normal rate, regular rhythm, normal heart sounds and normal pulses.   Pulses:       Radial pulses are 2+ on the right side, and 2+ on the left side.   Pulmonary/Chest: Effort  normal and breath sounds normal. No respiratory distress. He has no decreased breath sounds.   Abdominal: Soft. Normal appearance and bowel sounds are normal. There is tenderness in the right lower quadrant and left lower quadrant. There is no rigidity, no rebound, no guarding and no CVA tenderness.   Diffuse lower abdominal tenderness   Neurological: He is alert and oriented to person, place, and time. He has normal strength. Gait normal. GCS eye subscore is 4. GCS verbal subscore is 5. GCS motor subscore is 6.   Skin: Skin is warm, dry and intact. Capillary refill takes less than 2 seconds. No rash noted.   Psychiatric: He has a normal mood and affect. His speech is normal and behavior is normal.         ED Course   Procedures  Labs Reviewed   CBC W/ AUTO DIFFERENTIAL - Abnormal; Notable for the following:        Result Value    Lymph% 16.6 (*)     All other components within normal limits   COMPREHENSIVE METABOLIC PANEL - Abnormal; Notable for the following:     CO2 22 (*)     Glucose 111 (*)     All other components within normal limits   LIPASE   URINALYSIS         Imaging Results          X-Ray Abdomen AP 1 View (Final result)  Result time 03/08/18 14:40:18    Final result by Fernanda Jones MD (03/08/18 14:40:18)                 Impression:        Bowel gas pattern appears unremarkable.  No dilated loops of bowel are identified.  No obvious free air.    2 left renal calculi. No right renal or ureteral calculi are apparent.            Electronically signed by: FERNANDA JONES MD  Date:     03/08/18  Time:    14:40              Narrative:    03/08/18 14:30:00    Accession# [id number]    CLINICAL INDICATION: 42 year old M with Pain abdominal unsp. (789.00)      TECHNIQUE: Frontal radiograph(s) of the abdomen.    COMPARISON:  03/06/2018 CT    FINDINGS                                 Medical Decision Making:   History:   Old Medical Records: I decided to obtain old medical records.  Old Records Summarized: records  "from clinic visits and records from previous admission(s).  Initial Assessment:   Pt is a 42-year-old male with pmhx of kidney stones and asthma who presents today with diffuse lower abdominal pain with N/V since this am. Diffuse tenderness noted to lower abdomen. No guarding or rigidity noted. No masses or ascites noted. Normal bowel sounds. No N/V/D noted. On 2/26 pt presents to ED with lower abdominal pain and N/V, pt admitted with colitis, his symptoms were controlled and he was discharged. On 3/6 pt presents to ED with same abdominal pain with nausea, CT revealed enterocolitis, he received a GI referral and symptomatic care. Pt saw his PCP, Dr. Pablo today, was given prescription for zofran, cipro, flagyl and percocet.   Differential Diagnosis:   Colitis, kidney stone, diverticulitis, diverticulosis, IBD   Clinical Tests:   Lab Tests: Ordered and Reviewed  Radiological Study: Ordered and Reviewed  ED Management:  Labs, bentyl, zofran, morphine, KUB, IV     2:00 PM - Dr. Umanzor Spoke to Dr. Rios. He agrees with the current treatment plan and will see the patient in clinic on Monday.  Other:   I have discussed this case with another health care provider.  No imaging needed at this time. Pt presents with the same pain and there was no elevation in WBC and pt afebrile. Dr. Umanzor spoke with Dr. Rios and he agrees with the current tx plan and will see patient in clinic on Monday. Pt is stable, states that he "feels better" and that his pain is a "3/10" Pt will be d/c home. Pt instructed to f/u with Dr. Rios on Monday. Pt also instructed to increase oral hydration, follow a bland diet, and fill prescriptions of zofran, percocet, flagyl,and cipro that Dr. Pablo gave him. Pt instructed to return to ED if symptoms worsen or change, such as increased pain of if he cannot keep fluids down. Pt verbalized understanding and is in compliance and agreement with tx plan.               Attending Attestation: "     Physician Attestation Statement for NP/PA:   I have conducted a face to face encounter with this patient in addition to the NP/PA, due to NP/PA Request    Other NP/PA Attestation Additions:    History of Present Illness: 42M with ongoing lower abdominal pain with n/v.  Admitted recently for same and diagnosed with colitis.  Symptoms improved then returned.  Saw his PCP and the ER again and diagnosed with enterocolitis.  Saw PCP this morning for same and was prescribed cipro/flagyl, norco and zofran.  Came to ER after his appointment.      Medical Decision Making: I reviewed recent admission, ER visits, and clinic notes as per HPI.  Pt has had 4 CTs of his abd recently.  I gave him IVFs, pain meds, and anti-emetics and he improved.  No fever, leukocytosis, or peritoneal signs to warrant repeat CT.  No acute lab abnormalities to indicate further imaging either.  I did do KUB and there is no free air or other sign of perforation or obstruction. I spoke with Dr. Rios on call for GI and he has no further recommendations at this point.  Take prescribed therapy and follow up with him on Monday.                    Clinical Impression:   The encounter diagnosis was Abdominal pain, unspecified abdominal location.                           Samy Harris NP  03/08/18 1522       Beatriz Umanzor MD  03/09/18 1011

## 2018-03-08 NOTE — PROGRESS NOTES
Subjective:       Patient ID: Hardik Jimenez is a 42 y.o. male.    Chief Complaint: Abdominal Pain    HPI   Pt with nephrolithiasis is here for f/u regarding B/L lower abdominal pain. Pt was again seen in the ED on 3/6/18 where he had another CT scan of the Abdomen which showed Nonspecific distention of the small bowel loops and wall thickening of the large bowel.  The findings represent enterocolitis. The pain is described as a burning dull ache in nature currently rated an 8/10. Associated symptoms of nausea and he vomited a few times while in the room. Pt denies any diarrhea/constipation, fevers/chills. Eating makes it worse.   Review of Systems   Constitutional: Negative for activity change, appetite change, chills, diaphoresis, fatigue, fever and unexpected weight change.   HENT: Negative for postnasal drip, rhinorrhea, sinus pressure, sneezing, sore throat, trouble swallowing and voice change.    Respiratory: Negative for cough, shortness of breath and wheezing.    Cardiovascular: Negative for chest pain, palpitations and leg swelling.   Gastrointestinal: Positive for abdominal pain, nausea and vomiting. Negative for abdominal distention, anal bleeding, blood in stool, constipation, diarrhea and rectal pain.   Genitourinary: Negative for dysuria.   Musculoskeletal: Negative for arthralgias and myalgias.   Skin: Negative for rash and wound.   Allergic/Immunologic: Negative for environmental allergies and food allergies.   Hematological: Negative for adenopathy. Does not bruise/bleed easily.       Objective:      Physical Exam   Constitutional: He is oriented to person, place, and time. He appears well-developed and well-nourished. No distress.   HENT:   Head: Normocephalic and atraumatic.   Right Ear: External ear normal.   Left Ear: External ear normal.   Nose: Nose normal.   Mouth/Throat: Oropharynx is clear and moist. No oropharyngeal exudate.   Eyes: Conjunctivae and EOM are normal. Pupils are equal, round,  and reactive to light. Right eye exhibits no discharge. Left eye exhibits no discharge. No scleral icterus.   Neck: Normal range of motion. Neck supple. No JVD present.   Cardiovascular: Normal rate, regular rhythm and normal heart sounds.    No murmur heard.  Pulmonary/Chest: Effort normal and breath sounds normal. No respiratory distress. He has no wheezes. He has no rales.   Abdominal: Soft. Bowel sounds are normal. There is tenderness in the right lower quadrant and left lower quadrant. There is no rigidity, no rebound, no guarding, no CVA tenderness, no tenderness at McBurney's point and negative Whaley's sign.   Musculoskeletal: He exhibits no edema.   Lymphadenopathy:     He has no cervical adenopathy.   Neurological: He is alert and oriented to person, place, and time.   Skin: Skin is warm and dry. No rash noted. He is not diaphoretic. No pallor.       Assessment:       1. Lower abdominal pain    2. Enterocolitis    3. Nausea and vomiting, intractability of vomiting not specified, unspecified vomiting type        Plan:    1/2. Rx Cipro/Flagyl x 10 days          Rx Percocet 5-325 mg q6 PRN pain and Zofran 8 mg q8 hrs PRN nausea          Urgent referral to GI          ED precautions discussed with pt           Toradol 60 mg IM for pain   3. Phenergan 25 mg IM given

## 2018-03-08 NOTE — ED TRIAGE NOTES
Pt presents to ED with complaint of bilat lower abdominal pain with nausea for the past few days.  States just went to PCP for same and prescribed Abx, Norco, and Zofran but did not fill and came straight to ED d/t continued pain.

## 2018-03-09 ENCOUNTER — TELEPHONE (OUTPATIENT)
Dept: INTERNAL MEDICINE | Facility: CLINIC | Age: 43
End: 2018-03-09

## 2018-03-09 NOTE — TELEPHONE ENCOUNTER
Per pharmacist there is possiible interaction between oxycodone and cipro.    She says It Can increase oxycodone levels.     Substitute rx?    Please advise.  Thanks júnior

## 2018-03-09 NOTE — TELEPHONE ENCOUNTER
----- Message from Neda Morgan sent at 3/9/2018  8:52 AM CST -----  Contact: Carlota/YOLANDA/[phone]  Carlota would like a call back in regards to two scripts prescribed for the pt that are a counter interaction oxyCODONE-acetaminophen (PERCOCET) 5-325 mg per tablet, and ciprofloxacin HCl (CIPRO) 500 MG tablet.

## 2018-03-10 ENCOUNTER — HOSPITAL ENCOUNTER (EMERGENCY)
Facility: HOSPITAL | Age: 43
Discharge: HOME | End: 2018-03-10
Attending: EMERGENCY MEDICINE
Payer: COMMERCIAL

## 2018-03-10 VITALS
BODY MASS INDEX: 28.63 KG/M2 | DIASTOLIC BLOOD PRESSURE: 90 MMHG | SYSTOLIC BLOOD PRESSURE: 129 MMHG | RESPIRATION RATE: 18 BRPM | HEART RATE: 79 BPM | HEIGHT: 73 IN | WEIGHT: 216 LBS | OXYGEN SATURATION: 100 % | TEMPERATURE: 98 F

## 2018-03-10 VITALS
OXYGEN SATURATION: 100 % | HEART RATE: 71 BPM | SYSTOLIC BLOOD PRESSURE: 131 MMHG | DIASTOLIC BLOOD PRESSURE: 75 MMHG | RESPIRATION RATE: 20 BRPM | TEMPERATURE: 99 F | HEIGHT: 73 IN | WEIGHT: 216 LBS | BODY MASS INDEX: 28.63 KG/M2

## 2018-03-10 DIAGNOSIS — R10.9 ABDOMINAL PAIN, UNSPECIFIED ABDOMINAL LOCATION: Primary | ICD-10-CM

## 2018-03-10 DIAGNOSIS — R10.9 ABDOMINAL PAIN: ICD-10-CM

## 2018-03-10 LAB
ALBUMIN SERPL BCP-MCNC: 4 G/DL
ALBUMIN SERPL BCP-MCNC: 4.2 G/DL
ALP SERPL-CCNC: 69 U/L
ALP SERPL-CCNC: 71 U/L
ALT SERPL W/O P-5'-P-CCNC: 24 U/L
ALT SERPL W/O P-5'-P-CCNC: 24 U/L
AMPHET+METHAMPHET UR QL: NEGATIVE
ANION GAP SERPL CALC-SCNC: 7 MMOL/L
ANION GAP SERPL CALC-SCNC: 9 MMOL/L
AST SERPL-CCNC: 29 U/L
AST SERPL-CCNC: 32 U/L
BARBITURATES UR QL SCN>200 NG/ML: NEGATIVE
BASOPHILS # BLD AUTO: 0.01 K/UL
BASOPHILS # BLD AUTO: 0.03 K/UL
BASOPHILS NFR BLD: 0.1 %
BASOPHILS NFR BLD: 0.4 %
BENZODIAZ UR QL SCN>200 NG/ML: NEGATIVE
BILIRUB SERPL-MCNC: 0.7 MG/DL
BILIRUB SERPL-MCNC: 0.7 MG/DL
BILIRUB UR QL STRIP: NEGATIVE
BILIRUB UR QL STRIP: NEGATIVE
BUN SERPL-MCNC: 10 MG/DL
BUN SERPL-MCNC: 14 MG/DL
BZE UR QL SCN: NEGATIVE
CALCIUM SERPL-MCNC: 9.5 MG/DL
CALCIUM SERPL-MCNC: 9.6 MG/DL
CANNABINOIDS UR QL SCN: NORMAL
CHLORIDE SERPL-SCNC: 106 MMOL/L
CHLORIDE SERPL-SCNC: 107 MMOL/L
CLARITY UR REFRACT.AUTO: CLEAR
CLARITY UR: CLEAR
CO2 SERPL-SCNC: 22 MMOL/L
CO2 SERPL-SCNC: 24 MMOL/L
COLOR UR AUTO: YELLOW
COLOR UR: YELLOW
CREAT SERPL-MCNC: 1.4 MG/DL
CREAT SERPL-MCNC: 1.4 MG/DL
CREAT UR-MCNC: 92 MG/DL
DIFFERENTIAL METHOD: ABNORMAL
DIFFERENTIAL METHOD: ABNORMAL
EOSINOPHIL # BLD AUTO: 0 K/UL
EOSINOPHIL # BLD AUTO: 0.1 K/UL
EOSINOPHIL NFR BLD: 0.1 %
EOSINOPHIL NFR BLD: 0.8 %
ERYTHROCYTE [DISTWIDTH] IN BLOOD BY AUTOMATED COUNT: 13.5 %
ERYTHROCYTE [DISTWIDTH] IN BLOOD BY AUTOMATED COUNT: 13.7 %
EST. GFR  (AFRICAN AMERICAN): >60 ML/MIN/1.73 M^2
EST. GFR  (AFRICAN AMERICAN): >60 ML/MIN/1.73 M^2
EST. GFR  (NON AFRICAN AMERICAN): >60 ML/MIN/1.73 M^2
EST. GFR  (NON AFRICAN AMERICAN): >60 ML/MIN/1.73 M^2
ETHANOL SERPL-MCNC: <10 MG/DL
GLUCOSE SERPL-MCNC: 102 MG/DL
GLUCOSE SERPL-MCNC: 108 MG/DL
GLUCOSE UR QL STRIP: NEGATIVE
GLUCOSE UR QL STRIP: NEGATIVE
HCT VFR BLD AUTO: 36.6 %
HCT VFR BLD AUTO: 38.2 %
HGB BLD-MCNC: 13.2 G/DL
HGB BLD-MCNC: 13.6 G/DL
HGB UR QL STRIP: ABNORMAL
HGB UR QL STRIP: NEGATIVE
IMM GRANULOCYTES # BLD AUTO: 0.05 K/UL
IMM GRANULOCYTES NFR BLD AUTO: 0.6 %
KETONES UR QL STRIP: NEGATIVE
KETONES UR QL STRIP: NEGATIVE
LEUKOCYTE ESTERASE UR QL STRIP: NEGATIVE
LEUKOCYTE ESTERASE UR QL STRIP: NEGATIVE
LIPASE SERPL-CCNC: 25 U/L
LIPASE SERPL-CCNC: 6 U/L
LYMPHOCYTES # BLD AUTO: 1 K/UL
LYMPHOCYTES # BLD AUTO: 1.3 K/UL
LYMPHOCYTES NFR BLD: 12.5 %
LYMPHOCYTES NFR BLD: 17.1 %
MCH RBC QN AUTO: 28.9 PG
MCH RBC QN AUTO: 29.4 PG
MCHC RBC AUTO-ENTMCNC: 35.6 G/DL
MCHC RBC AUTO-ENTMCNC: 36.1 G/DL
MCV RBC AUTO: 80 FL
MCV RBC AUTO: 83 FL
METHADONE UR QL SCN>300 NG/ML: NEGATIVE
MONOCYTES # BLD AUTO: 0.5 K/UL
MONOCYTES # BLD AUTO: 1 K/UL
MONOCYTES NFR BLD: 12.1 %
MONOCYTES NFR BLD: 6.3 %
NEUTROPHILS # BLD AUTO: 5.4 K/UL
NEUTROPHILS # BLD AUTO: 6.3 K/UL
NEUTROPHILS NFR BLD: 69 %
NEUTROPHILS NFR BLD: 80.6 %
NITRITE UR QL STRIP: NEGATIVE
NITRITE UR QL STRIP: NEGATIVE
NRBC BLD-RTO: 0 /100 WBC
OPIATES UR QL SCN: NEGATIVE
PCP UR QL SCN>25 NG/ML: NEGATIVE
PH UR STRIP: 7 [PH] (ref 5–8)
PH UR STRIP: 8 [PH] (ref 5–8)
PLATELET # BLD AUTO: 226 K/UL
PLATELET # BLD AUTO: 247 K/UL
PMV BLD AUTO: 9.2 FL
PMV BLD AUTO: 9.5 FL
POTASSIUM SERPL-SCNC: 3.9 MMOL/L
POTASSIUM SERPL-SCNC: 4.5 MMOL/L
PROT SERPL-MCNC: 7.3 G/DL
PROT SERPL-MCNC: 7.6 G/DL
PROT UR QL STRIP: NEGATIVE
PROT UR QL STRIP: NEGATIVE
RBC # BLD AUTO: 4.56 M/UL
RBC # BLD AUTO: 4.63 M/UL
SODIUM SERPL-SCNC: 136 MMOL/L
SODIUM SERPL-SCNC: 139 MMOL/L
SP GR UR STRIP: 1.01 (ref 1–1.03)
SP GR UR STRIP: 1.01 (ref 1–1.03)
TOXICOLOGY INFORMATION: NORMAL
URN SPEC COLLECT METH UR: ABNORMAL
URN SPEC COLLECT METH UR: NORMAL
UROBILINOGEN UR STRIP-ACNC: NEGATIVE EU/DL
UROBILINOGEN UR STRIP-ACNC: NEGATIVE EU/DL
WBC # BLD AUTO: 7.78 K/UL
WBC # BLD AUTO: 7.85 K/UL

## 2018-03-10 PROCEDURE — 96372 THER/PROPH/DIAG INJ SC/IM: CPT

## 2018-03-10 PROCEDURE — 81003 URINALYSIS AUTO W/O SCOPE: CPT | Mod: 91

## 2018-03-10 PROCEDURE — 80307 DRUG TEST PRSMV CHEM ANLYZR: CPT

## 2018-03-10 PROCEDURE — 80053 COMPREHEN METABOLIC PANEL: CPT | Mod: 91

## 2018-03-10 PROCEDURE — 63600175 PHARM REV CODE 636 W HCPCS: Performed by: STUDENT IN AN ORGANIZED HEALTH CARE EDUCATION/TRAINING PROGRAM

## 2018-03-10 PROCEDURE — 99284 EMERGENCY DEPT VISIT MOD MDM: CPT | Mod: 25,27

## 2018-03-10 PROCEDURE — 63600175 PHARM REV CODE 636 W HCPCS: Performed by: EMERGENCY MEDICINE

## 2018-03-10 PROCEDURE — 96374 THER/PROPH/DIAG INJ IV PUSH: CPT

## 2018-03-10 PROCEDURE — 99284 EMERGENCY DEPT VISIT MOD MDM: CPT | Mod: ,,, | Performed by: EMERGENCY MEDICINE

## 2018-03-10 PROCEDURE — 99284 EMERGENCY DEPT VISIT MOD MDM: CPT | Mod: 25

## 2018-03-10 PROCEDURE — 96375 TX/PRO/DX INJ NEW DRUG ADDON: CPT

## 2018-03-10 PROCEDURE — 80320 DRUG SCREEN QUANTALCOHOLS: CPT

## 2018-03-10 PROCEDURE — 85025 COMPLETE CBC W/AUTO DIFF WBC: CPT

## 2018-03-10 PROCEDURE — 81003 URINALYSIS AUTO W/O SCOPE: CPT

## 2018-03-10 PROCEDURE — 25000003 PHARM REV CODE 250: Performed by: STUDENT IN AN ORGANIZED HEALTH CARE EDUCATION/TRAINING PROGRAM

## 2018-03-10 PROCEDURE — 25000003 PHARM REV CODE 250: Performed by: EMERGENCY MEDICINE

## 2018-03-10 PROCEDURE — 80053 COMPREHEN METABOLIC PANEL: CPT

## 2018-03-10 PROCEDURE — 85025 COMPLETE CBC W/AUTO DIFF WBC: CPT | Mod: 91

## 2018-03-10 PROCEDURE — 83690 ASSAY OF LIPASE: CPT | Mod: 91

## 2018-03-10 PROCEDURE — 83690 ASSAY OF LIPASE: CPT

## 2018-03-10 RX ORDER — DICYCLOMINE HYDROCHLORIDE 10 MG/ML
20 INJECTION INTRAMUSCULAR
Status: COMPLETED | OUTPATIENT
Start: 2018-03-10 | End: 2018-03-10

## 2018-03-10 RX ORDER — HYOSCYAMINE SULFATE 0.125 MG
125 TABLET ORAL EVERY 4 HOURS PRN
Qty: 10 TABLET | Refills: 0 | Status: SHIPPED | OUTPATIENT
Start: 2018-03-10 | End: 2021-08-10

## 2018-03-10 RX ORDER — ONDANSETRON 2 MG/ML
8 INJECTION INTRAMUSCULAR; INTRAVENOUS
Status: COMPLETED | OUTPATIENT
Start: 2018-03-10 | End: 2018-03-10

## 2018-03-10 RX ORDER — DICYCLOMINE HYDROCHLORIDE 10 MG/ML
20 INJECTION INTRAMUSCULAR
Status: DISCONTINUED | OUTPATIENT
Start: 2018-03-10 | End: 2018-03-10

## 2018-03-10 RX ORDER — ONDANSETRON 4 MG/1
8 TABLET, FILM COATED ORAL EVERY 8 HOURS PRN
Qty: 14 TABLET | Refills: 0 | Status: SHIPPED | OUTPATIENT
Start: 2018-03-10 | End: 2021-01-06

## 2018-03-10 RX ORDER — ACETAMINOPHEN 10 MG/ML
1000 INJECTION, SOLUTION INTRAVENOUS ONCE
Status: COMPLETED | OUTPATIENT
Start: 2018-03-10 | End: 2018-03-10

## 2018-03-10 RX ORDER — ACETAMINOPHEN 10 MG/ML
1000 INJECTION, SOLUTION INTRAVENOUS
Status: COMPLETED | OUTPATIENT
Start: 2018-03-10 | End: 2018-03-10

## 2018-03-10 RX ORDER — KETOROLAC TROMETHAMINE 30 MG/ML
30 INJECTION, SOLUTION INTRAMUSCULAR; INTRAVENOUS
Status: COMPLETED | OUTPATIENT
Start: 2018-03-10 | End: 2018-03-10

## 2018-03-10 RX ORDER — KETOROLAC TROMETHAMINE 30 MG/ML
60 INJECTION, SOLUTION INTRAMUSCULAR; INTRAVENOUS
Status: DISCONTINUED | OUTPATIENT
Start: 2018-03-10 | End: 2018-03-10

## 2018-03-10 RX ORDER — SODIUM CHLORIDE 9 MG/ML
1000 INJECTION, SOLUTION INTRAVENOUS
Status: COMPLETED | OUTPATIENT
Start: 2018-03-10 | End: 2018-03-10

## 2018-03-10 RX ORDER — HYOSCYAMINE SULFATE 0.12 MG/1
0.12 TABLET SUBLINGUAL
Status: COMPLETED | OUTPATIENT
Start: 2018-03-10 | End: 2018-03-10

## 2018-03-10 RX ADMIN — DICYCLOMINE HYDROCHLORIDE 20 MG: 20 INJECTION, SOLUTION INTRAMUSCULAR at 05:03

## 2018-03-10 RX ADMIN — ONDANSETRON HYDROCHLORIDE 8 MG: 2 INJECTION, SOLUTION INTRAMUSCULAR; INTRAVENOUS at 03:03

## 2018-03-10 RX ADMIN — KETOROLAC TROMETHAMINE 30 MG: 30 INJECTION, SOLUTION INTRAMUSCULAR at 09:03

## 2018-03-10 RX ADMIN — SODIUM CHLORIDE 1000 ML: 0.9 INJECTION, SOLUTION INTRAVENOUS at 03:03

## 2018-03-10 RX ADMIN — ACETAMINOPHEN 1000 MG: 10 INJECTION, SOLUTION INTRAVENOUS at 03:03

## 2018-03-10 RX ADMIN — ACETAMINOPHEN 1000 MG: 10 INJECTION, SOLUTION INTRAVENOUS at 10:03

## 2018-03-10 RX ADMIN — HYOSCYAMINE SULFATE 0.12 MG: 0.12 TABLET ORAL at 10:03

## 2018-03-10 NOTE — ED TRIAGE NOTES
Presents to Er with abdominal pain for weeks along with vomiting and diarrhea.    Pt identifiers checked and correct  LOC: The patient is awake, alert, aware of environment with an appropriate affect. Oriented x3, speaking appropriately  APPEARANCE: Pt resting comfortably, in no acute distress, pt is clean and well groomed, clothing properly fastened  SKIN: Skin warm, dry and intact, normal skin turgor, moist mucus membranes  RESPIRATORY: Airway is open and patent, respirations are spontaneous, even and unlabored, normal effort and rate  MUSCULOSKELETAL: No obvious deformities.

## 2018-03-10 NOTE — ED PROVIDER NOTES
Encounter Date: 3/10/2018       History     Chief Complaint   Patient presents with    Abdominal Pain     Pt c/o abdominal pain intermittently x 1wk.  Pt seen for same recently.      Hardik Jimenez is a 42 y.o. male with PMH asthma and kidney stones presents with several weeks of severe abd pain.  He has been seen in several EDs over past few weeks with same complaint, CT scans have shown stones in left kidney.  Pt states he followed up with his PCP and has been prescribed pain medications but was not given them because he was told there is a contraindication with the antibiotics he is taking. No fevers. Has had gilberto nausea but has not vomited. No problem with bowel movements. States he has had several CT scans but they have not been able to figure out what is wrong.          Review of patient's allergies indicates:  No Known Allergies  Past Medical History:   Diagnosis Date    Asthma     Renal disorder     Kidney stones     History reviewed. No pertinent surgical history.  Family History   Problem Relation Age of Onset    No Known Problems Mother     No Known Problems Father      Social History   Substance Use Topics    Smoking status: Never Smoker    Smokeless tobacco: Never Used    Alcohol use Yes      Comment: occasionally.     Review of Systems   Constitutional: Negative for activity change, appetite change, chills, diaphoresis, fatigue and fever.   HENT: Negative for congestion, rhinorrhea, sneezing and sore throat.    Eyes: Negative for photophobia and visual disturbance.   Respiratory: Negative for cough, chest tightness, shortness of breath and stridor.    Cardiovascular: Negative for chest pain and palpitations.   Gastrointestinal: Positive for abdominal pain, diarrhea and nausea. Negative for abdominal distention, anal bleeding, blood in stool, constipation and vomiting.   Genitourinary: Negative.    Musculoskeletal: Negative for arthralgias, back pain, gait problem, joint swelling, myalgias, neck  pain and neck stiffness.   Skin: Negative for color change, pallor and rash.   Neurological: Negative for dizziness, tremors, syncope, weakness, light-headedness, numbness and headaches.   Hematological: Negative.    Psychiatric/Behavioral: Negative.        Physical Exam     Initial Vitals [03/10/18 1350]   BP Pulse Resp Temp SpO2   (!) 163/94 64 18 98 °F (36.7 °C) 99 %      MAP       117         Physical Exam    Nursing note and vitals reviewed.  Constitutional: He appears well-developed and well-nourished. He is not diaphoretic. No distress.   HENT:   Head: Normocephalic and atraumatic.   Eyes: EOM are normal. Pupils are equal, round, and reactive to light.   Neck: Normal range of motion. Neck supple.   Cardiovascular: Normal rate, regular rhythm, normal heart sounds and intact distal pulses. Exam reveals no gallop and no friction rub.    No murmur heard.  Pulmonary/Chest: Breath sounds normal. No respiratory distress.   Abdominal: Soft. Normal appearance and bowel sounds are normal. He exhibits no shifting dullness and no distension. There is no hepatosplenomegaly. There is no tenderness. There is no rigidity, no rebound, no guarding, no tenderness at McBurney's point and negative Whaley's sign. No hernia.   Musculoskeletal: Normal range of motion. He exhibits no edema or tenderness.   Neurological: He is alert and oriented to person, place, and time. He has normal strength and normal reflexes. He displays normal reflexes. No cranial nerve deficit or sensory deficit.   Skin: Skin is warm and dry. Capillary refill takes less than 2 seconds. No pallor.   Psychiatric: He has a normal mood and affect. His behavior is normal. Judgment and thought content normal.         ED Course   Procedures  Labs Reviewed   CBC W/ AUTO DIFFERENTIAL - Abnormal; Notable for the following:        Result Value    RBC 4.56 (*)     Hemoglobin 13.2 (*)     Hematocrit 36.6 (*)     MCV 80 (*)     MCHC 36.1 (*)     Immature Granulocytes 0.6  (*)     Immature Grans (Abs) 0.05 (*)     Lymph% 17.1 (*)     All other components within normal limits   COMPREHENSIVE METABOLIC PANEL   LIPASE   URINALYSIS, REFLEX TO URINE CULTURE    Narrative:     Preferred Collection Type->Urine, Clean Catch   ALCOHOL,MEDICAL (ETHANOL)   DRUG SCREEN PANEL, URINE EMERGENCY   ALCOHOL,MEDICAL (ETHANOL)             Medical Decision Making:   Initial Assessment:   41 y/o M with complaint of abdominal pain- persistent. States he has not taken pain meds at home.   On exam intermittently moaning. On exam afebrile, normal vss. Benign abd exam.   Pt has had 4 CT scans in the past 2 weeks as well as 2 abd XR and scrotal US. Stones visualized in lt kidney. Some e/o colitis on last CT. Was started on cipro.   Pt is afebrile in ED. Basic blood work to eval for infection. No clinical concern for free air/acute inabd abnormality and given that pt has been imaged several times in the past 2 weeks will wait for blood work before proceeding with imaging. Possible pain is 2/2 to nephrolithiasis- await UA.  Clinical Tests:   Lab Tests: Ordered and Reviewed       APC / Resident Notes:   Hardik Jimenez is a 42 y.o. male with continued abd pain.  CT scans at OSHs have been negative.  Afebrile.  Will obtain labs (CBC, CMP, UA, tox screen).  Will give IV acetaminophen for pain.    Update 1710  CBC and UA with no abnormality.  Continues to complain of pain described as cramping.  Will give IM Bentyl    Update 1719  Had discussion with patient that the etiology of his pain is unknown, all lab work has returned normal and past CT scans have not shown any reason for his continued pain.  He has been given Rxs for abx at OSH and has percocet at home.  Patient became very vocal that his pain was not being treated appropriately and is requesting to be evaluated by another physician.    Update 1530:  Patient became hostile to staff prior to administration of bentyl.  Security called and patient discharged as all  tests have returned normal.  He has stopped writhing in pain and walked out independently and not moaning in pain.         Attending Attestation:   Physician Attestation Statement for Resident:  As the supervising MD   Physician Attestation Statement: I have personally seen and examined this patient.   I agree with the above history. -:   As the supervising MD I agree with the above PE.    As the supervising MD I agree with the above treatment, course, plan, and disposition.  I have reviewed and agree with the residents interpretation of the following: lab data.            Attending ED Notes:   I entered pts room to discuss results with results and inform him that Dr. Pablo had oked his prescription for oxycodone and pt became verbally hostile towards me. Expressed feeling frustrated and angry that we were not treating him and accusing him of seeking pain medications. I tried to reassure him but he continued to become verbally abusive, using expletives and finally stated he wanted to be discharged. I again tried to discuss results and studies over the past 2 weeks to determine next best step but pt stated he was leaving. Encouraged pt to return to follow up with his PCP or return to ED for continued or worsenign pain. He states he was going directly to another hospital.             Clinical Impression:   The encounter diagnosis was Abdominal pain, unspecified abdominal location.    Disposition:   Disposition: Discharged  Condition: Stable                        Shy Maya MD  03/11/18 0003

## 2018-03-11 NOTE — ED NOTES
"Pt states he was seen at [facility] and dx with possible kidney stones. States was also seen by PCP, "given a shot for pain" and told if pain was not resolved to go to ED. Pt states "I immediately left and went to ED". Pt states attempted to have pain medication filled, "but [facility] would not fill because of interactions medications would have". Pt states went to [facility], but left because "I did not like the way I was being treated". Pt moaning at triage, but stops to answer questions. Pt had feet on wall when RN walked up to triage.   "

## 2018-03-11 NOTE — ED NOTES
Pain med ordered; pt on phone in room and not moaning and no vomiting observed; pt reports same pain and md is aware; plan to discharge and give pt rx x 2 for pain and nausea

## 2018-03-11 NOTE — ED NOTES
Pt departed the er ambulatory with a steady unassisted gait and talking on the phone the whole time; I apologized again and enc pt to follow up and take meds as prescribed previously and then today

## 2018-03-11 NOTE — ED NOTES
Pt requesting md to speak to him regarding his continued pain; pt agitated cursing and restless; iv acetaminophen in progress

## 2018-03-11 NOTE — ED NOTES
Pt received the levsin sl; pt reports less abd pain at present; no vomiting witnessed; pt given rx x 2 with home care and gi referral for follow up; pt told he is discharged and offered to escort out; pt not getting off the stretcher and reports is still having pain; enc pt to take there rx meds as was prescribed earlier and rx x 2 given today; pt reports is driving self home; pt refusing to depart the er and charge nurse nikki made aware; security called for assistance; pt now talking on the phone in the room; pt is calm and not moaning

## 2018-03-11 NOTE — ED PROVIDER NOTES
Encounter Date: 3/10/2018    SCRIBE #1 NOTE: I, Justyna Packer, am scribing for, and in the presence of,  Dr. Ceron. I have scribed the entire note.       History     Chief Complaint   Patient presents with    Abdominal Pain     Right sided abd pain. states pain began at right flank. Described as dull, burning that radiates from RLQ around and up to LUQ and down to left groin. Reports trouble starting urine stream, states is able to maintain stream once started.     Time patient was seen by the provider: 8:44 PM      The patient is a 42 y.o. male with a PMHx of asthma and kidney stones who presents to the ED with a complaint of intermittent LLQ pain for the past week. He was seen here a few days ago, and received treatment and felt better. His pain was notably worse this morning, however, which is why he came to the ED. He says his pain is similar to the pain he experienced with his previous kidney stone. He reports sharp, stabbing pain.  No radiation of the pain to his chest.  No associated fever, chills.  No dysuria, hematuria, or change in the color of his urine.  He also reports a few episodes of vomiting with small specks of dark blood noted in the emesis. Normal BM yesterday.  No bm today.  He has not eaten today. No exacerbating or alleviating factors reported.  No meds taken at home.   Pt was seen at [facility] earlier today but left without treatment.     Pt states that he has a hx of kidney stones.  No hx of requiring urological intervention.  No hx of diverticulitis.    Since February of this year, this is his 6th visit for Abd pain and he has had 4 CT a/p in this short period of time.          Review of patient's allergies indicates:  No Known Allergies  Past Medical History:   Diagnosis Date    Asthma     Renal disorder     Kidney stones     No past surgical history on file.  Family History   Problem Relation Age of Onset    No Known Problems Mother     No Known Problems Father      Social  History   Substance Use Topics    Smoking status: Never Smoker    Smokeless tobacco: Never Used    Alcohol use Yes      Comment: occasionally.     Review of Systems   Constitutional: Positive for appetite change. Negative for activity change, chills and fever.   HENT: Negative for congestion and sore throat.    Respiratory: Negative for chest tightness and shortness of breath.    Cardiovascular: Negative for chest pain and palpitations.   Gastrointestinal: Positive for abdominal pain, nausea and vomiting. Negative for abdominal distention and diarrhea.   Endocrine: Negative for polydipsia and polyphagia.   Genitourinary: Negative for difficulty urinating, dysuria, flank pain, frequency, hematuria and urgency.   Musculoskeletal: Negative for back pain and neck pain.   Skin: Negative for pallor and rash.   Neurological: Negative for dizziness and weakness.   Hematological: Does not bruise/bleed easily.   All other systems reviewed and are negative.      Physical Exam     Initial Vitals [03/10/18 1925]   BP Pulse Resp Temp SpO2   (!) 133/91 62 20 98.1 °F (36.7 °C) 100 %      MAP       105         Physical Exam    Nursing note and vitals reviewed.  Constitutional: He appears well-developed and well-nourished. He is not diaphoretic. No distress.   HENT:   Head: Normocephalic and atraumatic.   Mouth/Throat: Oropharynx is clear and moist.   Eyes: Conjunctivae are normal. No scleral icterus.   Neck: Normal range of motion. Neck supple.   Cardiovascular: Normal rate, regular rhythm and normal heart sounds. Exam reveals no gallop and no friction rub.    No murmur heard.  Pulmonary/Chest: Breath sounds normal. No respiratory distress. He has no wheezes. He has no rhonchi. He has no rales.   Abdominal: Soft. Bowel sounds are normal. He exhibits no distension. There is no tenderness. There is no rebound and no guarding.   Musculoskeletal: Normal range of motion.   Neurological: He is alert and oriented to person, place, and  time.   Skin: Skin is warm and dry. No rash noted. No pallor.   Psychiatric: He has a normal mood and affect. His behavior is normal.         ED Course   Procedures  Labs Reviewed   CBC W/ AUTO DIFFERENTIAL - Abnormal; Notable for the following:        Result Value    Hemoglobin 13.6 (*)     Hematocrit 38.2 (*)     Gran% 80.6 (*)     Lymph% 12.5 (*)     All other components within normal limits   COMPREHENSIVE METABOLIC PANEL - Abnormal; Notable for the following:     CO2 22 (*)     Anion Gap 7 (*)     All other components within normal limits   LIPASE   URINALYSIS          X-Rays:   Independently Interpreted Readings:   Abdomen: Nonspecific bowel gas.  No free air under diaphragm.  No air fluid levels or signs of obstruction.     Medical Decision Making:   History:   Old Medical Records: I decided to obtain old medical records.  Old Records Summarized: other records.       <> Summary of Records: Was admitted to [facility] on Feb 26th for flank pain and was seen by urology during the admission. They did not feel the patient's pain was attributed to anything in the urinary tract. Patient's CT did not show evidence of diverticulitis. GI recommended he come in for outpatient colonoscopy. Patient was discharged with Bentyl and Norco.    On March 6th, he presented to the ED with Abd pain, and had a CT Abd with contrast which showed a stable, left-sided, non-obstructing stone, and findings consistent with enterocolitis. Patient was discharged home to follow up with GI.    On March 8th. He was seen by his PCP for Abd pain and was prescribed cipro, flagyl, percocet, and Zofran  Later the same day, he presented to the [place] ED with Abd pain. In the ED, he was treated with IV fluids, pain medications, and antiemetics. His symptoms improved. No findings to warrant a repeat CT scan. Abd XR done with no acute findings. He was told to follow up with Dr. Rios (GI) on Monday as scheduled.    Today, patient presented to ED  "at [facility] with Abd pain. He became hostile to staff and left prior to treatment.      He has had 4 CT scans of his abdomen in the past 3 weeks.   Initial Assessment:   42 y.o. male with multiple recent ED visits for Abd pain presents with intermittent LLQ pain, similar to previous kidney stone.  Differential Diagnosis:    gastritis, gastroenteritis, pancreatitis, cholecystitis, ileus, small bowel obstruction, appendicitis, GERD, intestinal spasm, gastroenteritis, gastritis, ulcer, cholecystitis, gallstones, pancreatitis, ileus, small bowel obstruction, appendicitis, constipation, intestinal gas pain  UTI, pyelonephritis, and kidney stone   Independently Interpreted Test(s):   I have ordered and independently interpreted X-rays - see prior notes.  Clinical Tests:   Lab Tests: Ordered and Reviewed  The following lab test(s) were unremarkable: CBC, CMP and Lipase       <> Summary of Lab:  Pt drug screen from earlier today was + for THC  U/A from earlier today was neg for blood or infection.  Pt refuses to give urine again at this time.  Stable BUN/CR  No elevation of wbc  Lipase wnl  Radiological Study: Ordered and Reviewed  ED Management:  IV toradol given in ED while awaiting labs and reviewing chart.  Pt reports no improvement after IV toradol.  Bentyl was ordered which patient refused.  IV tylenol ordered for pain    Pt labs are wnl.  He is afebrile with stable vitals and well appearing.  abd is soft and ND.  No n/v/d.  No TTP on exam.      On my 2nd re-evaluation, the patient only wants to talk about how he was mis-treated at [facility] today and that he wants to make a complaint against his earlier doctor.  He is texting a friend constantly that he claims is a urologist regarding his CT scan from last week.  The patient then states that he is "well off.  I took my watch off before I came in here.  I had a $45,000 watch on. I am not here for drugs."  I have informed the patient that I do not feel he is drug seeking " but am rather trying to find a source of his pain.  I have explained that he has too many CT lately and too much radiation exposure to have another unnecessary CT scan.  I have obtained an ABD US and ABD xray today that are both neg for acute changes.  Pt labs are wnl.  He is not febrile.  Vitals stable.  He has a scheduled appt with Dr. Rios on Monday and I have encouraged him to keep this appointment for further evaluation of his pain.                        Clinical Impression:     1. Abdominal pain          Disposition:   Disposition: Discharged  Condition: Stable       ,I, Dr. Jacqueline Ceron, personally performed the services described in this documentation. All medical record entries made by the scribe were at my direction and in my presence.  I have reviewed the chart and agree that the record reflects my personal performance and is accurate and complete. Jacqueline Ceron MD.  10:56 PM 03/10/2018                       Jacqueline Ceron MD  03/10/18 [id number]

## 2018-03-11 NOTE — ED TRIAGE NOTES
Pt presents to ED with c/o right lower abd pain. Pt was recently dx with kidney stone and seen at several hospitals in this past week but pain remains the same.

## 2018-03-12 ENCOUNTER — OFFICE VISIT (OUTPATIENT)
Dept: GASTROENTEROLOGY | Facility: CLINIC | Age: 43
End: 2018-03-12
Payer: COMMERCIAL

## 2018-03-12 VITALS
HEART RATE: 78 BPM | DIASTOLIC BLOOD PRESSURE: 77 MMHG | WEIGHT: 218.06 LBS | BODY MASS INDEX: 28.77 KG/M2 | SYSTOLIC BLOOD PRESSURE: 115 MMHG

## 2018-03-12 DIAGNOSIS — R93.5 ABNORMAL CT OF THE ABDOMEN: Primary | ICD-10-CM

## 2018-03-12 PROCEDURE — 99999 PR PBB SHADOW E&M-EST. PATIENT-LVL III: CPT | Mod: PBBFAC,,, | Performed by: INTERNAL MEDICINE

## 2018-03-12 PROCEDURE — 99204 OFFICE O/P NEW MOD 45 MIN: CPT | Mod: S$GLB,,, | Performed by: INTERNAL MEDICINE

## 2018-03-12 NOTE — PATIENT INSTRUCTIONS
Patient scheduled for EGD/COLONOSCOPY with Dr. Rios on 03/22/2018.  Miralax Split Prep instructions explained to patient . Lab will call two days before with arrival time at [facility]. Make sure to have someone to drive you home after procedure.  Verbalized understanding.

## 2018-03-12 NOTE — PROGRESS NOTES
Subjective:       Patient ID: Hardik Jimenez is a 42 y.o. male.    Chief Complaint: Abdominal Pain    This is a 42-year-old male who presents for evaluation of subacute abdominal pain and changes in bowel habits.  Patient noted the symptoms over the past 6 weeks with several emergency room visits since that time.  I reviewed these records.  Endorses abdominal discomfort which is predominantly left-sided left upper and lower quadrant.  The pain is daily, slightly improved over the past 3 days but his been moderate in intensity and sharp.  He denies similar symptoms this in the past.  He did have some loose stools associated with this.  He has begun taking Cipro and Flagyl some improvement.  No preceding travel history or antibiotic usage.  Some nausea and vomiting as well including an episode of hematemesis.  No history of liver disease.  Was a scant amount of bright red blood.  He also notes some scant hematochezia associated with looser stools.  No unintentional weight loss or change in stool caliber.  There is no personal or family history of gastrointestinal diseases or malignancy or inflammatory bowel disease.  His never had an upper endoscopy or colonoscopy.  He denies any NSAID usage.  No other exacerbating or relieving factors or other associated symptoms.  No oral ulcerations, lower extremity rash or blurry vision.  No arthralgias.    The following portions of the patient's history were reviewed and updated as appropriate: allergies, current medications, past family history, past medical history, past social history, past surgical history and problem list.  (Portions of this note were dictated using voice recognition software and may contain dictation related errors in spelling/grammar/syntax not found on text review)    HPI  Review of Systems   Constitutional: Negative for chills, fever and unexpected weight change.   HENT: Negative for postnasal drip and trouble swallowing.    Eyes: Negative for pain and  visual disturbance.   Respiratory: Negative for cough, choking and shortness of breath.    Cardiovascular: Negative for chest pain and leg swelling.   Gastrointestinal: Positive for abdominal distention, abdominal pain, blood in stool, nausea and vomiting. Negative for anal bleeding, diarrhea and rectal pain.   Endocrine: Negative for cold intolerance and heat intolerance.   Genitourinary: Negative for difficulty urinating and hematuria.   Musculoskeletal: Negative for arthralgias and back pain.   Neurological: Negative for dizziness and numbness.   Hematological: Negative for adenopathy. Does not bruise/bleed easily.   Psychiatric/Behavioral: Negative for agitation and confusion.       Objective:      Physical Exam   Constitutional: He is oriented to person, place, and time. He appears well-developed and well-nourished. No distress.   HENT:   Head: Normocephalic and atraumatic.   Eyes: Conjunctivae are normal. No scleral icterus.   Neck: No tracheal deviation present. No thyromegaly present.   Cardiovascular: Normal rate, regular rhythm and normal heart sounds.  Exam reveals no gallop and no friction rub.    No murmur heard.  Pulmonary/Chest: Effort normal and breath sounds normal. He has no wheezes. He has no rales.   Abdominal: Soft. Bowel sounds are normal. He exhibits no distension. There is no tenderness. There is no rebound and no guarding.   Musculoskeletal: Normal range of motion. He exhibits no edema or tenderness.   Neurological: He is alert and oriented to person, place, and time.   Skin: He is not diaphoretic.   Psychiatric: He has a normal mood and affect. His behavior is normal.   Nursing note and vitals reviewed.      Labs/Imaging: reviewed, enterocolitis  Assessment:       1. Abnormal CT of the abdomen        Plan:   1. Colonoscopy  2. Continue meds

## 2018-03-13 ENCOUNTER — TELEPHONE (OUTPATIENT)
Dept: GASTROENTEROLOGY | Facility: CLINIC | Age: 43
End: 2018-03-13

## 2018-03-27 ENCOUNTER — ANESTHESIA (OUTPATIENT)
Dept: ENDOSCOPY | Facility: HOSPITAL | Age: 43
End: 2018-03-27
Payer: COMMERCIAL

## 2018-03-27 ENCOUNTER — ANESTHESIA EVENT (OUTPATIENT)
Dept: ENDOSCOPY | Facility: HOSPITAL | Age: 43
End: 2018-03-27
Payer: COMMERCIAL

## 2018-03-27 ENCOUNTER — HOSPITAL ENCOUNTER (OUTPATIENT)
Facility: HOSPITAL | Age: 43
Discharge: HOME | End: 2018-03-27
Attending: INTERNAL MEDICINE | Admitting: INTERNAL MEDICINE
Payer: COMMERCIAL

## 2018-03-27 ENCOUNTER — SURGERY (OUTPATIENT)
Age: 43
End: 2018-03-27

## 2018-03-27 VITALS
RESPIRATION RATE: 18 BRPM | WEIGHT: 210 LBS | HEIGHT: 73 IN | HEART RATE: 66 BPM | TEMPERATURE: 98 F | BODY MASS INDEX: 27.83 KG/M2 | DIASTOLIC BLOOD PRESSURE: 73 MMHG | OXYGEN SATURATION: 98 % | SYSTOLIC BLOOD PRESSURE: 112 MMHG

## 2018-03-27 DIAGNOSIS — R93.5 ABNORMAL CT OF THE ABDOMEN: Primary | ICD-10-CM

## 2018-03-27 PROCEDURE — 43239 EGD BIOPSY SINGLE/MULTIPLE: CPT | Mod: 51,,, | Performed by: INTERNAL MEDICINE

## 2018-03-27 PROCEDURE — 37000008 HC ANESTHESIA 1ST 15 MINUTES: Performed by: INTERNAL MEDICINE

## 2018-03-27 PROCEDURE — 88305 TISSUE EXAM BY PATHOLOGIST: CPT | Mod: 26,,, | Performed by: PATHOLOGY

## 2018-03-27 PROCEDURE — 45378 DIAGNOSTIC COLONOSCOPY: CPT | Mod: ,,, | Performed by: INTERNAL MEDICINE

## 2018-03-27 PROCEDURE — 88305 TISSUE EXAM BY PATHOLOGIST: CPT | Performed by: PATHOLOGY

## 2018-03-27 PROCEDURE — 43239 EGD BIOPSY SINGLE/MULTIPLE: CPT | Performed by: INTERNAL MEDICINE

## 2018-03-27 PROCEDURE — 27201012 HC FORCEPS, HOT/COLD, DISP: Performed by: INTERNAL MEDICINE

## 2018-03-27 PROCEDURE — 25000003 PHARM REV CODE 250: Performed by: INTERNAL MEDICINE

## 2018-03-27 PROCEDURE — 63600175 PHARM REV CODE 636 W HCPCS: Performed by: NURSE ANESTHETIST, CERTIFIED REGISTERED

## 2018-03-27 PROCEDURE — 45378 DIAGNOSTIC COLONOSCOPY: CPT | Performed by: INTERNAL MEDICINE

## 2018-03-27 PROCEDURE — 37000009 HC ANESTHESIA EA ADD 15 MINS: Performed by: INTERNAL MEDICINE

## 2018-03-27 RX ORDER — PROPOFOL 10 MG/ML
VIAL (ML) INTRAVENOUS CONTINUOUS PRN
Status: DISCONTINUED | OUTPATIENT
Start: 2018-03-27 | End: 2018-03-27

## 2018-03-27 RX ORDER — LIDOCAINE HCL/PF 100 MG/5ML
SYRINGE (ML) INTRAVENOUS
Status: DISCONTINUED | OUTPATIENT
Start: 2018-03-27 | End: 2018-03-27

## 2018-03-27 RX ORDER — PROPOFOL 10 MG/ML
VIAL (ML) INTRAVENOUS
Status: DISCONTINUED | OUTPATIENT
Start: 2018-03-27 | End: 2018-03-27

## 2018-03-27 RX ORDER — SODIUM CHLORIDE 9 MG/ML
INJECTION, SOLUTION INTRAVENOUS CONTINUOUS
Status: DISCONTINUED | OUTPATIENT
Start: 2018-03-27 | End: 2018-03-27 | Stop reason: HOSPADM

## 2018-03-27 RX ADMIN — PROPOFOL 40 MG: 10 INJECTION, EMULSION INTRAVENOUS at 11:03

## 2018-03-27 RX ADMIN — LIDOCAINE HYDROCHLORIDE 40 MG: 20 INJECTION, SOLUTION INTRAVENOUS at 11:03

## 2018-03-27 RX ADMIN — SODIUM CHLORIDE: 0.9 INJECTION, SOLUTION INTRAVENOUS at 10:03

## 2018-03-27 RX ADMIN — PROPOFOL 150 MCG/KG/MIN: 10 INJECTION, EMULSION INTRAVENOUS at 11:03

## 2018-03-27 NOTE — PLAN OF CARE
Past Medical History:   Diagnosis Date    Asthma     Renal disorder     Kidney stones     EGD/Colonoscopy exams today for abnormal imagiong study, abdominal pain and nausea. Spouse at bedside, ok to speak to her re results of exam.

## 2018-03-27 NOTE — ANESTHESIA POSTPROCEDURE EVALUATION
"Anesthesia Post Evaluation    Patient: Hardik Jimenez    Procedure(s) Performed: Procedure(s) (LRB):  ESOPHAGOGASTRODUODENOSCOPY (EGD) (N/A)  COLONOSCOPY (N/A)    Final Anesthesia Type: MAC  Patient location during evaluation: OPS  Patient participation: Yes- Able to Participate  Level of consciousness: awake and alert  Post-procedure vital signs: reviewed and stable  Pain management: adequate  Airway patency: patent  PONV status at discharge: No PONV  Anesthetic complications: no      Cardiovascular status: blood pressure returned to baseline and hemodynamically stable  Respiratory status: unassisted, spontaneous ventilation and room air  Hydration status: euvolemic  Follow-up not needed.        Visit Vitals  /70   Pulse 82   Temp 37.1 °C (98.8 °F) (Oral)   Resp 20   Ht 6' 1" (1.854 m)   Wt 95.3 kg (210 lb)   SpO2 97%   BMI 27.71 kg/m²       Pain/Sal Score: Pain Assessment Performed: Yes (3/27/2018 10:36 AM)  Presence of Pain: complains of pain/discomfort (3/27/2018 10:36 AM)  Pain Rating Prior to Med Admin: 5 (3/27/2018 10:36 AM)      "

## 2018-03-27 NOTE — PROVATION PATIENT INSTRUCTIONS
Discharge Summary/Instructions after an Endoscopic Procedure  Patient Name: Hardik Jimenez  Patient MRN: [id number]  Patient YOB: 1975 Tuesday, March 27, 2018  Sara Rios MD  RESTRICTIONS:  During your procedure today, you received medications for sedation.  These   medications may affect your judgment, balance and coordination.  Therefore,   for 24 hours, you have the following restrictions:   - DO NOT drive a car, operate machinery, make legal/financial decisions,   sign important papers or drink alcohol.    ACTIVITY:  The following day: return to full activity including work, except no heavy   lifting, straining or running for 3 days if polyps were removed.  DIET:  Eat and drink normally unless instructed otherwise.     TREATMENT FOR COMMON SIDE EFFECTS:  - Mild abdominal pain, nausea, belching, bloating or excessive gas:  rest,   eat lightly and use a heating pad.  - Sore Throat: treat with throat lozenges and/or gargle with warm salt   water.  - Because air was used during the procedure, expelling large amounts of air   from your rectum or belching is normal.  - If a bowel prep was taken, you may not have a bowel movement for 1-3 days.    This is normal.  SYMPTOMS TO WATCH FOR AND REPORT TO YOUR PHYSICIAN:  1. Abdominal pain or bloating, other than gas cramps.  2. Chest pain.  3. Back pain.  4. Signs of infection such as: chills or fever occurring within 24 hours   after the procedure.  5. Rectal bleeding, which would show as bright red, maroon, or black stools.   (A tablespoon of blood from the rectum is not serious, especially if   hemorrhoids are present.)  6. Vomiting.  7. Weakness or dizziness.  GO DIRECTLY TO THE NEAREST EMERGENCY ROOM IF YOU HAVE ANY OF THE FOLLOWING:      Difficulty breathing              Chills and/or fever over 101 F   Persistent vomiting and/or vomiting blood   Severe abdominal pain   Severe chest pain   Black, tarry stools   Bleeding- more than one tablespoon   Any  other symptom or condition that you feel may need urgent attention  Your doctor recommends these additional instructions:  If any biopsies were taken, your doctors clinic will contact you in 1 to 2   weeks with any results.  You are being discharged to home.   You have a contact number available for emergencies.  The signs and symptoms   of potential delayed complications were discussed with you.  You may return   to normal activities tomorrow.  Written discharge instructions were   provided to you.   Resume your previous diet.   Continue your present medications.   Your physician has recommended a repeat colonoscopy in 10 years for   screening purposes.  For questions, problems or results please call your physician - Sara Rios MD at Work:  ( [phone].  EMERGENCY PHONE NUMBER: [phone],  LAB RESULTS: [phone]  IF A COMPLICATION OR EMERGENCY SITUATION ARISES AND YOU ARE UNABLE TO REACH   YOUR PHYSICIAN - GO DIRECTLY TO THE EMERGENCY ROOM.  Sara Rios MD  3/27/2018 11:57:15 AM  This report has been verified and signed electronically.

## 2018-03-27 NOTE — TRANSFER OF CARE
"Anesthesia Transfer of Care Note    Patient: Hardik Jimenez    Procedure(s) Performed: Procedure(s) (LRB):  ESOPHAGOGASTRODUODENOSCOPY (EGD) (N/A)  COLONOSCOPY (N/A)    Patient location: GI    Anesthesia Type: MAC    Transport from OR: Transported from OR on room air with adequate spontaneous ventilation    Post pain: adequate analgesia    Post assessment: no apparent anesthetic complications    Post vital signs: stable    Level of consciousness: awake    Nausea/Vomiting: no nausea/vomiting    Complications: none    Transfer of care protocol was followed      Last vitals:   Visit Vitals  /70   Pulse 82   Temp 37.1 °C (98.8 °F) (Oral)   Resp 20   Ht 6' 1" (1.854 m)   Wt 95.3 kg (210 lb)   SpO2 97%   BMI 27.71 kg/m²     "

## 2018-03-27 NOTE — PLAN OF CARE
1235 pt ambulated to bathroom and passed loose yellow stool and flatus with abdom soft ,non tender and states he feels relief from above.

## 2018-03-27 NOTE — PLAN OF CARE
Dr whiteside at pts bedside speaking to pt and spouse with all questions addressed with d/c instructions discussed with proc report in hand.

## 2018-03-27 NOTE — PROVATION PATIENT INSTRUCTIONS
Discharge Summary/Instructions after an Endoscopic Procedure  Patient Name: Hardik Jimenez  Patient MRN: [id number]  Patient YOB: 1975 Tuesday, March 27, 2018  Sara Rios MD  RESTRICTIONS:  During your procedure today, you received medications for sedation.  These   medications may affect your judgment, balance and coordination.  Therefore,   for 24 hours, you have the following restrictions:   - DO NOT drive a car, operate machinery, make legal/financial decisions,   sign important papers or drink alcohol.    ACTIVITY:  The following day: return to full activity including work, except no heavy   lifting, straining or running for 3 days if polyps were removed.  DIET:  Eat and drink normally unless instructed otherwise.     TREATMENT FOR COMMON SIDE EFFECTS:  - Mild abdominal pain, nausea, belching, bloating or excessive gas:  rest,   eat lightly and use a heating pad.  - Sore Throat: treat with throat lozenges and/or gargle with warm salt   water.  - Because air was used during the procedure, expelling large amounts of air   from your rectum or belching is normal.  - If a bowel prep was taken, you may not have a bowel movement for 1-3 days.    This is normal.  SYMPTOMS TO WATCH FOR AND REPORT TO YOUR PHYSICIAN:  1. Abdominal pain or bloating, other than gas cramps.  2. Chest pain.  3. Back pain.  4. Signs of infection such as: chills or fever occurring within 24 hours   after the procedure.  5. Rectal bleeding, which would show as bright red, maroon, or black stools.   (A tablespoon of blood from the rectum is not serious, especially if   hemorrhoids are present.)  6. Vomiting.  7. Weakness or dizziness.  GO DIRECTLY TO THE NEAREST EMERGENCY ROOM IF YOU HAVE ANY OF THE FOLLOWING:      Difficulty breathing              Chills and/or fever over 101 F   Persistent vomiting and/or vomiting blood   Severe abdominal pain   Severe chest pain   Black, tarry stools   Bleeding- more than one tablespoon   Any  other symptom or condition that you feel may need urgent attention  Your doctor recommends these additional instructions:  If any biopsies were taken, your doctors clinic will contact you in 1 to 2   weeks with any results.  You are being discharged to home.   You have a contact number available for emergencies.  The signs and symptoms   of potential delayed complications were discussed with you.  You may return   to normal activities tomorrow.  Written discharge instructions were   provided to you.   Resume your previous diet.   Continue your present medications.   We are waiting for your pathology results.  For questions, problems or results please call your physician - Sara Rios MD at Work:  ( [phone].  EMERGENCY PHONE NUMBER: [phone],  LAB RESULTS: [phone]  IF A COMPLICATION OR EMERGENCY SITUATION ARISES AND YOU ARE UNABLE TO REACH   YOUR PHYSICIAN - GO DIRECTLY TO THE EMERGENCY ROOM.  Sara Rios MD  3/27/2018 11:37:27 AM  This report has been verified and signed electronically.

## 2018-03-27 NOTE — ANESTHESIA PREPROCEDURE EVALUATION
03/27/2018  Hardik Jimenez is a 42 y.o., male.    Anesthesia Evaluation     I have reviewed the Nursing Notes.   I have reviewed the Medications.     Review of Systems  Anesthesia Hx:  No problems with previous Anesthesia Denies Hx of Anesthetic complications Denies Family Hx of Anesthesia complications.    Social:  Non-Smoker, No Alcohol Use    Hematology/Oncology:  Hematology Normal   Oncology Normal     EENT/Dental:EENT/Dental Normal   Cardiovascular:  Cardiovascular Normal Exercise tolerance: good   Functional Capacity good / => 4 METS    Pulmonary:   Asthma    Renal/:   Chronic Renal Disease    Hepatic/GI:  Hepatic/GI Normal    Musculoskeletal:  Musculoskeletal Normal    Neurological:  Neurology Normal    Endocrine:  Endocrine Normal        Physical Exam  General:  Well nourished    Airway/Jaw/Neck:  Airway Findings: Mouth Opening: Normal Tongue: Normal  General Airway Assessment: Adult  Mallampati: II  TM Distance: Normal, at least 6 cm  Jaw/Neck Findings:     Neck ROM: Normal ROM      Dental:  Dental Findings: In tact   Chest/Lungs:  Chest/Lungs Findings: Clear to auscultation         Mental Status:  Mental Status Findings:  Cooperative, Alert and Oriented         Anesthesia Plan  Type of Anesthesia, risks & benefits discussed:  Anesthesia Type:  MAC  Patient's Preference: MAC  Intra-op Monitoring Plan:   Intra-op Monitoring Plan Comments:   Post Op Pain Control Plan:   Post Op Pain Control Plan Comments:   Induction:   IV  Beta Blocker:         Informed Consent: Patient understands risks and agrees with Anesthesia plan.  Questions answered. Anesthesia consent signed with patient.  ASA Score: 2     Day of Surgery Review of History & Physical:            Ready For Surgery From Anesthesia Perspective.

## 2018-03-30 ENCOUNTER — HOSPITAL ENCOUNTER (EMERGENCY)
Facility: HOSPITAL | Age: 43
Discharge: HOME | End: 2018-03-30
Attending: EMERGENCY MEDICINE
Payer: COMMERCIAL

## 2018-03-30 VITALS
OXYGEN SATURATION: 93 % | WEIGHT: 210 LBS | RESPIRATION RATE: 16 BRPM | SYSTOLIC BLOOD PRESSURE: 140 MMHG | TEMPERATURE: 97 F | HEART RATE: 69 BPM | DIASTOLIC BLOOD PRESSURE: 83 MMHG | HEIGHT: 73 IN | BODY MASS INDEX: 27.83 KG/M2

## 2018-03-30 DIAGNOSIS — R31.29 MICROSCOPIC HEMATURIA: ICD-10-CM

## 2018-03-30 DIAGNOSIS — R10.9 ABDOMINAL PAIN: Primary | ICD-10-CM

## 2018-03-30 LAB
ALBUMIN SERPL BCP-MCNC: 4.4 G/DL
ALP SERPL-CCNC: 74 U/L
ALT SERPL W/O P-5'-P-CCNC: 23 U/L
AMPHET+METHAMPHET UR QL: NEGATIVE
ANION GAP SERPL CALC-SCNC: 12 MMOL/L
AST SERPL-CCNC: 24 U/L
BARBITURATES UR QL SCN>200 NG/ML: NEGATIVE
BASOPHILS # BLD AUTO: 0.03 K/UL
BASOPHILS NFR BLD: 0.5 %
BENZODIAZ UR QL SCN>200 NG/ML: NEGATIVE
BILIRUB SERPL-MCNC: 1.1 MG/DL
BILIRUB UR QL STRIP: NEGATIVE
BUN SERPL-MCNC: 15 MG/DL
BZE UR QL SCN: NEGATIVE
CALCIUM SERPL-MCNC: 10.2 MG/DL
CANNABINOIDS UR QL SCN: ABNORMAL
CHLORIDE SERPL-SCNC: 102 MMOL/L
CLARITY UR: CLEAR
CO2 SERPL-SCNC: 19 MMOL/L
COLOR UR: ABNORMAL
CREAT SERPL-MCNC: 1.5 MG/DL
CREAT UR-MCNC: 390.2 MG/DL
DIFFERENTIAL METHOD: ABNORMAL
EOSINOPHIL # BLD AUTO: 0 K/UL
EOSINOPHIL NFR BLD: 0.2 %
ERYTHROCYTE [DISTWIDTH] IN BLOOD BY AUTOMATED COUNT: 13.6 %
EST. GFR  (AFRICAN AMERICAN): >60 ML/MIN/1.73 M^2
EST. GFR  (NON AFRICAN AMERICAN): 56 ML/MIN/1.73 M^2
GLUCOSE SERPL-MCNC: 109 MG/DL
GLUCOSE UR QL STRIP: NEGATIVE
HCT VFR BLD AUTO: 44.6 %
HGB BLD-MCNC: 16.1 G/DL
HGB UR QL STRIP: ABNORMAL
KETONES UR QL STRIP: ABNORMAL
LACTATE SERPL-SCNC: 1.3 MMOL/L
LEUKOCYTE ESTERASE UR QL STRIP: NEGATIVE
LIPASE SERPL-CCNC: 6 U/L
LYMPHOCYTES # BLD AUTO: 1.2 K/UL
LYMPHOCYTES NFR BLD: 19.9 %
MCH RBC QN AUTO: 29.1 PG
MCHC RBC AUTO-ENTMCNC: 36.1 G/DL
MCV RBC AUTO: 81 FL
METHADONE UR QL SCN>300 NG/ML: NEGATIVE
MONOCYTES # BLD AUTO: 0.7 K/UL
MONOCYTES NFR BLD: 11.4 %
NEUTROPHILS # BLD AUTO: 4 K/UL
NEUTROPHILS NFR BLD: 68 %
NITRITE UR QL STRIP: NEGATIVE
OPIATES UR QL SCN: NEGATIVE
PCP UR QL SCN>25 NG/ML: NEGATIVE
PH UR STRIP: 6 [PH] (ref 5–8)
PLATELET # BLD AUTO: 243 K/UL
PMV BLD AUTO: 9.4 FL
POTASSIUM SERPL-SCNC: 4.1 MMOL/L
PROT SERPL-MCNC: 8.6 G/DL
PROT UR QL STRIP: NEGATIVE
RBC # BLD AUTO: 5.54 M/UL
SODIUM SERPL-SCNC: 133 MMOL/L
SP GR UR STRIP: >=1.03 (ref 1–1.03)
TOXICOLOGY INFORMATION: ABNORMAL
URN SPEC COLLECT METH UR: ABNORMAL
UROBILINOGEN UR STRIP-ACNC: NEGATIVE EU/DL
WBC # BLD AUTO: 5.94 K/UL

## 2018-03-30 PROCEDURE — 63600175 PHARM REV CODE 636 W HCPCS: Performed by: NURSE PRACTITIONER

## 2018-03-30 PROCEDURE — 80053 COMPREHEN METABOLIC PANEL: CPT

## 2018-03-30 PROCEDURE — 85025 COMPLETE CBC W/AUTO DIFF WBC: CPT

## 2018-03-30 PROCEDURE — 81003 URINALYSIS AUTO W/O SCOPE: CPT

## 2018-03-30 PROCEDURE — 96365 THER/PROPH/DIAG IV INF INIT: CPT

## 2018-03-30 PROCEDURE — 83605 ASSAY OF LACTIC ACID: CPT

## 2018-03-30 PROCEDURE — 96372 THER/PROPH/DIAG INJ SC/IM: CPT

## 2018-03-30 PROCEDURE — 63600175 PHARM REV CODE 636 W HCPCS: Performed by: EMERGENCY MEDICINE

## 2018-03-30 PROCEDURE — 96375 TX/PRO/DX INJ NEW DRUG ADDON: CPT

## 2018-03-30 PROCEDURE — 96366 THER/PROPH/DIAG IV INF ADDON: CPT

## 2018-03-30 PROCEDURE — 80307 DRUG TEST PRSMV CHEM ANLYZR: CPT

## 2018-03-30 PROCEDURE — 99284 EMERGENCY DEPT VISIT MOD MDM: CPT | Mod: 25

## 2018-03-30 PROCEDURE — 25000003 PHARM REV CODE 250: Performed by: NURSE PRACTITIONER

## 2018-03-30 PROCEDURE — 83690 ASSAY OF LIPASE: CPT

## 2018-03-30 RX ORDER — KETOROLAC TROMETHAMINE 10 MG/1
10 TABLET, FILM COATED ORAL
Qty: 10 TABLET | Refills: 0 | OUTPATIENT
Start: 2018-03-30 | End: 2021-01-06

## 2018-03-30 RX ORDER — ONDANSETRON 4 MG/1
4 TABLET, ORALLY DISINTEGRATING ORAL EVERY 8 HOURS PRN
Qty: 10 TABLET | Refills: 0 | OUTPATIENT
Start: 2018-03-30 | End: 2021-01-06

## 2018-03-30 RX ORDER — ONDANSETRON 2 MG/ML
4 INJECTION INTRAMUSCULAR; INTRAVENOUS
Status: COMPLETED | OUTPATIENT
Start: 2018-03-30 | End: 2018-03-30

## 2018-03-30 RX ORDER — HYDROMORPHONE HYDROCHLORIDE 2 MG/ML
1 INJECTION, SOLUTION INTRAMUSCULAR; INTRAVENOUS; SUBCUTANEOUS
Status: COMPLETED | OUTPATIENT
Start: 2018-03-30 | End: 2018-03-30

## 2018-03-30 RX ORDER — DICYCLOMINE HYDROCHLORIDE 10 MG/ML
20 INJECTION INTRAMUSCULAR
Status: COMPLETED | OUTPATIENT
Start: 2018-03-30 | End: 2018-03-30

## 2018-03-30 RX ADMIN — SODIUM CHLORIDE 1000 ML: 0.9 INJECTION, SOLUTION INTRAVENOUS at 10:03

## 2018-03-30 RX ADMIN — HYDROMORPHONE HYDROCHLORIDE 1 MG: 2 INJECTION INTRAMUSCULAR; INTRAVENOUS; SUBCUTANEOUS at 12:03

## 2018-03-30 RX ADMIN — PROMETHAZINE HYDROCHLORIDE: 25 INJECTION INTRAMUSCULAR; INTRAVENOUS at 10:03

## 2018-03-30 RX ADMIN — ONDANSETRON 4 MG: 2 INJECTION INTRAMUSCULAR; INTRAVENOUS at 12:03

## 2018-03-30 RX ADMIN — DICYCLOMINE HYDROCHLORIDE 20 MG: 20 INJECTION, SOLUTION INTRAMUSCULAR at 10:03

## 2018-03-30 NOTE — DISCHARGE INSTRUCTIONS
Increase your fluid intake.  Return to the ED if your condition changes, progresses, or if you have any concerns.

## 2018-03-30 NOTE — ED NOTES
"Assumed care of a 42 year old black male presented to the ed today c/o severe back pain abdomen pain radiating into the groin that began at 2 am . Had a colonoscopy  Earlier this week for abdomen pain , hx of kidney stones  Wife states ".. Abdomen pain began 6 weeks agio just getting worse and no one can find out what is wrong everything is normal ,but something is wrong . His urine is dark , I think this may be a stone "   200 cc gricel urine noted to urinal . Iv intact to left wrist no redness or swelling noted .   "

## 2018-03-30 NOTE — ED NOTES
Pt says he doesn't remember what the doctor told him . Request for doctor to come speak to him and his wife .. Md notified

## 2018-03-30 NOTE — ED PROVIDER NOTES
"Encounter Date: 3/30/2018       History     Chief Complaint   Patient presents with    Back Pain     pt c/o right back pain that radiates to abdomen and right leg and groin.     Abdominal Pain     xweeks. had colonoscopy with Dr. Rios this week.      43yo male with pmhx of asthma and sciatica presents to the ED for abdominal and back pain. Pt reports this pain has been going on for a few weeks and is not improving.  Pt reports the pain is located in his flank area and radiates to his lower abdomen.  Pt reports the pain is "cramping" and is constant.  No trauma, fever/chills, CP, SOB, v/d, constipation, dysuria, hematuria, or penile discharge.  He was seen He had a colonoscopy with  on Tuesday, and he reports that since the colonoscopy his pain has been worse.  He has been seen in the ED three times since February for this complaint and all CTs have been negative for acute changes.  Pt was completed antibiotics for colitis within the past few weeks.  He was previously prescribed Norco, but is now out of the medication.  This is the extent of the patient's complaints at this time.        The history is provided by the patient, the spouse and medical records.   Abdominal Pain   The current episode started several weeks ago. The onset of the illness was gradual. The abdominal pain is generalized. The abdominal pain radiates to the back. The severity of the abdominal pain is 10/10. The abdominal pain is relieved by nothing. The other symptoms of the illness include nausea. The other symptoms of the illness do not include fever, shortness of breath, vomiting, diarrhea or dysuria.   Nausea began more than 1 week ago. The nausea is associated with eating. The nausea is exacerbated by food.   The patient has not had a change in bowel habit. Additional symptoms associated with the illness include back pain. Symptoms associated with the illness do not include chills or hematuria. Significant associated medical " issues do not include GERD, inflammatory bowel disease, diabetes or cardiac disease.     Review of patient's allergies indicates:  No Known Allergies  Past Medical History:   Diagnosis Date    Arthritis     Asthma     Renal disorder     Kidney stones    Sciatica      Past Surgical History:   Procedure Laterality Date    COLONOSCOPY N/A 3/27/2018    Procedure: COLONOSCOPY;  Surgeon: Sara Rios MD;  Location: [facility];  Service: Endoscopy;  Laterality: N/A;     Family History   Problem Relation Age of Onset    No Known Problems Mother     No Known Problems Father      Social History   Substance Use Topics    Smoking status: Never Smoker    Smokeless tobacco: Never Used    Alcohol use Yes      Comment: occasionally.     Review of Systems   Constitutional: Positive for appetite change. Negative for chills and fever.   HENT: Negative for congestion.    Respiratory: Negative for cough and shortness of breath.    Cardiovascular: Negative for chest pain and palpitations.   Gastrointestinal: Positive for abdominal pain and nausea. Negative for blood in stool, diarrhea and vomiting.   Genitourinary: Negative for decreased urine volume, difficulty urinating, discharge, dysuria, hematuria and testicular pain.   Musculoskeletal: Positive for back pain. Negative for neck pain and neck stiffness.   Skin: Negative for rash and wound.   Allergic/Immunologic: Negative for immunocompromised state.   Neurological: Negative for weakness and headaches.   Psychiatric/Behavioral: Negative for confusion.       Physical Exam     Initial Vitals [03/30/18 0917]   BP Pulse Resp Temp SpO2   122/80 85 (!) 25 98.2 °F (36.8 °C) 97 %      MAP       94         Physical Exam    Nursing note and vitals reviewed.  Constitutional: Vital signs are normal. He appears well-developed and well-nourished. He is active and cooperative. He is easily aroused.  Non-toxic appearance. He does not have a sickly appearance. He does not appear ill. He  appears distressed.   Pt writing in bed. Groaning.    HENT:   Head: Normocephalic and atraumatic.   Nose: Nose normal.   Mouth/Throat: Uvula is midline, oropharynx is clear and moist and mucous membranes are normal.   Eyes: Conjunctivae are normal.   Neck: Normal range of motion.   Cardiovascular: Normal rate, regular rhythm and normal heart sounds.   Pulses:       Dorsalis pedis pulses are 2+ on the right side, and 2+ on the left side.   Pulmonary/Chest: Effort normal and breath sounds normal.   Abdominal: Soft. Normal appearance and bowel sounds are normal. He exhibits no distension. There is no tenderness. There is no rigidity, no rebound, no guarding and no CVA tenderness.   Musculoskeletal:        Cervical back: Normal.        Thoracic back: Normal.        Lumbar back: Normal.   Neurological: He is alert, oriented to person, place, and time and easily aroused. He has normal strength. No sensory deficit. Gait normal. GCS eye subscore is 4. GCS verbal subscore is 5. GCS motor subscore is 6.   SLR negative bilaterally.    Skin: Skin is warm, dry and intact. Capillary refill takes less than 2 seconds. No abrasion, no bruising and no rash noted. No erythema.   Psychiatric: He has a normal mood and affect. His speech is normal and behavior is normal. Judgment and thought content normal. Cognition and memory are normal.         ED Course   Procedures  Labs Reviewed   CBC W/ AUTO DIFFERENTIAL - Abnormal; Notable for the following:        Result Value    MCV 81 (*)     MCHC 36.1 (*)     All other components within normal limits   COMPREHENSIVE METABOLIC PANEL - Abnormal; Notable for the following:     Sodium 133 (*)     CO2 19 (*)     Creatinine 1.5 (*)     Total Protein 8.6 (*)     Total Bilirubin 1.1 (*)     eGFR if non  56 (*)     All other components within normal limits   URINALYSIS - Abnormal; Notable for the following:     Color, UA Brown (*)     Specific Gravity, UA >=1.030 (*)     Ketones, UA 1+  (*)     Occult Blood UA Trace (*)     All other components within normal limits   DRUG SCREEN PANEL, URINE EMERGENCY - Abnormal; Notable for the following:     Creatinine, Random Ur 390.2 (*)     All other components within normal limits   LIPASE   LACTIC ACID, PLASMA        Imaging Results          US Retroperitoneal Complete (Final result)  Result time 03/30/18 13:45:01    Final result by Kelvin Herrera MD (03/30/18 13:45:01)                 Impression:      Left renal 2 small nephroliths.    Otherwise, normal renal morphology bilaterally without hydronephrosis.      Electronically signed by: Kelvin Herrera MD  Date:    03/30/2018  Time:    13:45             Narrative:    EXAMINATION:  US RETROPERITONEAL COMPLETE    CLINICAL HISTORY:  flank pain; hematuria;    TECHNIQUE:  Ultrasound of the kidneys and urinary bladder was performed including color flow and Doppler evaluation of the kidneys.    COMPARISON:  Renal ultrasound 03/10/2018 and CT abdomen and pelvis 03/06/2018    FINDINGS:  Right kidney: The right kidney measures 11.3 cm. No cortical thinning. No loss of corticomedullary distinction.  Perfusion is normal.  Resistive index measures 0.50.  No mass. No renal stone. No hydronephrosis.    Left kidney: The left kidney measures 11 cm. No cortical thinning. No loss of corticomedullary distinction.  Perfusion is normal.  Resistive index measures 0.55.  No mass. There are 2 calculi measuring 4 mm at the upper pole and 4 mm at the lower pole.  No hydronephrosis.    The bladder is partially distended at the time of scanning and has an unremarkable appearance.                               X-Ray Abdomen Flat And Erect (Final result)  Result time 03/30/18 10:18:17    Final result by Willie Gayle DO (03/30/18 10:18:17)                 Impression:      1.  As above      Electronically signed by: Willie Gayle DO  Date:    03/30/2018  Time:    10:18             Narrative:    EXAMINATION:  XR ABDOMEN FLAT AND  ERECT    CLINICAL HISTORY:  Unspecified abdominal pain    TECHNIQUE:  Flat and erect AP views of the abdomen were preformed.    COMPARISON:  03/10/2018    FINDINGS:  The bowel gas pattern is nonspecific and nonobstructive.  There appear to be 2 nonobstructing calculi seen overlying the left kidney 1 measuring approximately 4 mm within the lower pole and the other measuring approximately 3 mm within the mid to upper pole.  No evidence for free air.                                   Medical Decision Making:   History:   Old Medical Records: I decided to obtain old medical records.  Old Records Summarized: records from another hospital, records from clinic visits and other records.       <> Summary of Records: Old Records Summarized: other records.       <> Summary of Records: Was admitted to [facility] on Feb 26th for flank pain and was seen by urology during the admission. They did not feel the patient's pain was attributed to anything in the urinary tract. Patient's CT did not show evidence of diverticulitis. GI recommended he come in for outpatient colonoscopy. Patient was discharged with Bentyl and Norco.     On March 6th, he presented to the ED with Abd pain, and had a CT Abd with contrast which showed a stable, left-sided, non-obstructing stone, and findings consistent with enterocolitis. Patient was discharged home to follow up with GI.     On March 8th. He was seen by his PCP for Abd pain and was prescribed cipro, flagyl, percocet, and Zofran  Later the same day, he presented to the [place] ED with Abd pain. In the ED, he was treated with IV fluids, pain medications, and antiemetics. His symptoms improved. No findings to warrant a repeat CT scan. Abd XR done with no acute findings. He was told to follow up with Dr. Rios (GI) on Monday as scheduled.    3/10/18- Kidney ultrasound negative for acute changes. Xray unremarkable.  Labs unremarkable.  Advised f/u with  as scheduled.     3/27/18- Upper  "GI and colonoscopy: The entire examined colon is normal on direct and                         retroflexion views.                        - The examined portion of the ileum was normal.                        - Diverticulosis in the ascending colon and in the                         cecum.                        - No specimens collected.                         - Normal esophagus.                        - Gastritis. Biopsied.                        - Erythematous duodenopathy.      Initial Assessment:   41yo male here for genearlized abd and back pain for three weeks.  Pt denies trauma, fever/chills, CP, SOB, weakness, numbness/tingling, loss of bowel or bladder control, saddle anesthesia, and gait disturbance.  Nothing helps pain or makes it worse.  No vomiting, diarrhea, urinary symptoms, or constipation. No rectal bleeding.  Pt appears uncomfortable but not ill or toxic.  MM moist.  Abd soft, non-tender.  No r/r/g, distention, or CVA tenderness.  Back normal.  Sensation and strength intact BLE. No s/sx cauda equina.  DP 2+ bilatearlly by palpation.    Differential Diagnosis:   GERD, intestinal spasm, gastroenteritis, gastritis, ulcer, cholecystitis, gallstones, pancreatitis, ileus, small bowel obstruction, appendicitis, constipation, intestinal gas pain.      Clinical Tests:   Lab Tests: Ordered and Reviewed  Radiological Study: Ordered and Reviewed  ED Management:  Labs, IV fluids, IM bentyl, IV phenergan, Xray abd/pelvis  Pt has had multiple CTs and other imaging over the past month.  WBC normal. H&H stable. Lipase normal.  BUN/Creat stable at patient's baseline.  UA negative for infection.  US ordered for occult blood noted on urinalysis.   1:49 PM- Patient resting comfortably.  Awaiting US results.   US reveals two non-obstructing left small nephroliths without hydronephrosis.   Pt was advised to f/u with PCP within 2 days.  Pt requested medication "stronger than Percocet 5" for discharge.  Pt will not be " prescribed narcotic medication at this time.  I informed the patient that he will be prescribed NSAID therapy.  Advised return to the ED if condition changes, progresses, or if there are any concerns.  Pt verbalized understanding, compliance, and agreement with the treatment plan. RX Toradol and Zofran ODT.                Attending Attestation:     Physician Attestation Statement for NP/PA:   I have conducted a face to face encounter with this patient in addition to the NP/PA, due to NP/PA Request    Other NP/PA Attestation Additions:      Medical Decision Making: Patient here with same back pain and abdominal pain that he has had for weeks.  He has had GI workup and urology workup.  Recent GI scope few days ago.  No incontinence.  Abdominal exam is benign.  Patient's AXR without free air.  Low suspicion got intestinal rupture.  Remainder of workup nondiagnostic.  Patient stable for discharge to follow up with his specialists.                      Clinical Impression:   The primary encounter diagnosis was Abdominal pain. A diagnosis of Microscopic hematuria was also pertinent to this visit.    Disposition:   Disposition: Discharged  Condition: Stable                        OTILIA Cuevas  03/30/18 1433       Nishi Lugo MD  03/30/18 [id number]

## 2018-07-26 ENCOUNTER — OFFICE VISIT (OUTPATIENT)
Dept: INTERNAL MEDICINE | Facility: CLINIC | Age: 43
End: 2018-07-26
Payer: COMMERCIAL

## 2018-07-26 VITALS
WEIGHT: 214.94 LBS | RESPIRATION RATE: 18 BRPM | DIASTOLIC BLOOD PRESSURE: 70 MMHG | SYSTOLIC BLOOD PRESSURE: 122 MMHG | TEMPERATURE: 99 F | HEIGHT: 73 IN | BODY MASS INDEX: 28.49 KG/M2 | HEART RATE: 76 BPM

## 2018-07-26 DIAGNOSIS — J20.9 ACUTE BRONCHITIS, UNSPECIFIED ORGANISM: ICD-10-CM

## 2018-07-26 DIAGNOSIS — R51.9 SINUS HEADACHE: ICD-10-CM

## 2018-07-26 DIAGNOSIS — J01.00 ACUTE NON-RECURRENT MAXILLARY SINUSITIS: Primary | ICD-10-CM

## 2018-07-26 PROCEDURE — 99999 PR PBB SHADOW E&M-EST. PATIENT-LVL III: CPT | Mod: PBBFAC,,, | Performed by: INTERNAL MEDICINE

## 2018-07-26 PROCEDURE — 99214 OFFICE O/P EST MOD 30 MIN: CPT | Mod: 25,S$GLB,, | Performed by: INTERNAL MEDICINE

## 2018-07-26 PROCEDURE — 96372 THER/PROPH/DIAG INJ SC/IM: CPT | Mod: S$GLB,,, | Performed by: INTERNAL MEDICINE

## 2018-07-26 PROCEDURE — 3008F BODY MASS INDEX DOCD: CPT | Mod: CPTII,S$GLB,, | Performed by: INTERNAL MEDICINE

## 2018-07-26 RX ORDER — LEVOCETIRIZINE DIHYDROCHLORIDE 5 MG/1
5 TABLET, FILM COATED ORAL NIGHTLY
Qty: 30 TABLET | Refills: 3 | Status: SHIPPED | OUTPATIENT
Start: 2018-07-26 | End: 2021-09-22

## 2018-07-26 RX ORDER — TRIAMCINOLONE ACETONIDE 40 MG/ML
40 INJECTION, SUSPENSION INTRA-ARTICULAR; INTRAMUSCULAR
Status: COMPLETED | OUTPATIENT
Start: 2018-07-26 | End: 2018-07-26

## 2018-07-26 RX ORDER — FLUTICASONE PROPIONATE 50 MCG
2 SPRAY, SUSPENSION (ML) NASAL DAILY
Qty: 16 G | Refills: 2 | Status: SHIPPED | OUTPATIENT
Start: 2018-07-26 | End: 2018-08-25

## 2018-07-26 RX ORDER — METHYLPREDNISOLONE 4 MG/1
TABLET ORAL
Qty: 1 PACKAGE | Refills: 0 | Status: SHIPPED | OUTPATIENT
Start: 2018-07-26 | End: 2021-08-10

## 2018-07-26 RX ADMIN — TRIAMCINOLONE ACETONIDE 40 MG: 40 INJECTION, SUSPENSION INTRA-ARTICULAR; INTRAMUSCULAR at 11:07

## 2018-07-26 NOTE — PROGRESS NOTES
Subjective:       Patient ID: Hardik Jimenez is a 42 y.o. male.    Chief Complaint: Cough; Chest Congestion; and Nasal Congestion    HPI   Pt here c/o 1 week of persistent sinus/chest congestion, productive cough, post nasal drip, sinus headache. Minimal relief with Augmentin(on day # 4).  Review of Systems   Constitutional: Negative for activity change, appetite change, chills, diaphoresis, fatigue, fever and unexpected weight change.   HENT: Positive for congestion, postnasal drip, rhinorrhea, sinus pain and sinus pressure. Negative for sneezing, sore throat, trouble swallowing and voice change.    Respiratory: Positive for cough and wheezing. Negative for shortness of breath.    Cardiovascular: Negative for chest pain, palpitations and leg swelling.   Gastrointestinal: Negative for abdominal pain, blood in stool, constipation, diarrhea, nausea and vomiting.   Genitourinary: Negative for dysuria.   Musculoskeletal: Negative for arthralgias and myalgias.   Skin: Negative for rash and wound.   Allergic/Immunologic: Negative for environmental allergies and food allergies.   Neurological: Positive for headaches.   Hematological: Negative for adenopathy. Does not bruise/bleed easily.       Objective:      Physical Exam   Constitutional: He is oriented to person, place, and time. He appears well-developed and well-nourished. No distress.   HENT:   Head: Normocephalic and atraumatic.   Right Ear: External ear normal.   Left Ear: External ear normal.   Nose: Mucosal edema and rhinorrhea present. Right sinus exhibits maxillary sinus tenderness.   Mouth/Throat: Oropharynx is clear and moist. No oropharyngeal exudate.   Eyes: Conjunctivae and EOM are normal. Pupils are equal, round, and reactive to light. Right eye exhibits no discharge. Left eye exhibits no discharge. No scleral icterus.   Neck: Normal range of motion. Neck supple. No JVD present.   Cardiovascular: Normal rate, regular rhythm and normal heart sounds.    No  murmur heard.  Pulmonary/Chest: Effort normal and breath sounds normal. No respiratory distress. He has no wheezes. He has no rales.   Abdominal: Soft. Bowel sounds are normal. There is no tenderness.   Musculoskeletal: He exhibits no edema.   Lymphadenopathy:     He has no cervical adenopathy.   Neurological: He is alert and oriented to person, place, and time.   Skin: Skin is warm and dry. No rash noted. He is not diaphoretic. No pallor.       Assessment:       1. Acute non-recurrent maxillary sinusitis    2. Acute bronchitis, unspecified organism    3. Sinus headache        Plan:    1. Complete Augmentin as prescribed       Rx Medrol Pack, Xyzal/Floanse   2. Start Mucinex DM   3. NSAIDs PRN

## 2018-08-01 ENCOUNTER — TELEPHONE (OUTPATIENT)
Dept: INTERNAL MEDICINE | Facility: CLINIC | Age: 43
End: 2018-08-01

## 2018-08-01 NOTE — TELEPHONE ENCOUNTER
----- Message from Penelope Jhaveri sent at 8/1/2018 11:41 AM CDT -----  Contact: Pt Home [phone] or Mobile [phone]  Patient is calling in regards to the medication for Prednisone and fluticasone (FLONASE) 50 mcg/actuation nasal spray. He said that he was given this medication but they're not working for him because he's still coughing up mucus for over a week. He would like for you to write him a different script for his symptoms.   Patient's pharmacy [id number] - [place], [place] - [address]  [facility] Phone# [phone], Fax# [phone].

## 2018-10-18 ENCOUNTER — HOSPITAL ENCOUNTER (EMERGENCY)
Facility: OTHER | Age: 43
Discharge: HOME | End: 2018-10-18
Attending: EMERGENCY MEDICINE
Payer: COMMERCIAL

## 2018-10-18 VITALS
RESPIRATION RATE: 30 BRPM | BODY MASS INDEX: 26.95 KG/M2 | OXYGEN SATURATION: 99 % | DIASTOLIC BLOOD PRESSURE: 66 MMHG | WEIGHT: 210 LBS | HEART RATE: 60 BPM | TEMPERATURE: 98 F | SYSTOLIC BLOOD PRESSURE: 135 MMHG | HEIGHT: 74 IN

## 2018-10-18 DIAGNOSIS — N50.819 TESTICULAR PAIN, UNSPECIFIED: ICD-10-CM

## 2018-10-18 DIAGNOSIS — R10.30 LOWER ABDOMINAL PAIN: Primary | ICD-10-CM

## 2018-10-18 DIAGNOSIS — R10.30 INGUINAL PAIN, UNSPECIFIED LATERALITY: ICD-10-CM

## 2018-10-18 DIAGNOSIS — R52 PAIN: ICD-10-CM

## 2018-10-18 LAB
ALBUMIN SERPL BCP-MCNC: 4 G/DL
ALP SERPL-CCNC: 72 U/L
ALT SERPL W/O P-5'-P-CCNC: 21 U/L
ANION GAP SERPL CALC-SCNC: 8 MMOL/L
AST SERPL-CCNC: 22 U/L
BASOPHILS # BLD AUTO: 0.01 K/UL
BASOPHILS NFR BLD: 0.1 %
BILIRUB SERPL-MCNC: 0.5 MG/DL
BILIRUB UR QL STRIP: NEGATIVE
BUN SERPL-MCNC: 19 MG/DL
CALCIUM SERPL-MCNC: 9.9 MG/DL
CHLORIDE SERPL-SCNC: 107 MMOL/L
CLARITY UR: CLEAR
CO2 SERPL-SCNC: 25 MMOL/L
COLOR UR: YELLOW
CREAT SERPL-MCNC: 1.3 MG/DL
DIFFERENTIAL METHOD: ABNORMAL
EOSINOPHIL # BLD AUTO: 0 K/UL
EOSINOPHIL NFR BLD: 0.1 %
ERYTHROCYTE [DISTWIDTH] IN BLOOD BY AUTOMATED COUNT: 13.7 %
EST. GFR  (AFRICAN AMERICAN): >60 ML/MIN/1.73 M^2
EST. GFR  (NON AFRICAN AMERICAN): >60 ML/MIN/1.73 M^2
GLUCOSE SERPL-MCNC: 142 MG/DL
GLUCOSE UR QL STRIP: NEGATIVE
HCT VFR BLD AUTO: 38.1 %
HGB BLD-MCNC: 13.5 G/DL
HGB UR QL STRIP: NEGATIVE
KETONES UR QL STRIP: NEGATIVE
LEUKOCYTE ESTERASE UR QL STRIP: NEGATIVE
LIPASE SERPL-CCNC: 46 U/L
LYMPHOCYTES # BLD AUTO: 1.5 K/UL
LYMPHOCYTES NFR BLD: 14.1 %
MCH RBC QN AUTO: 29.8 PG
MCHC RBC AUTO-ENTMCNC: 35.4 G/DL
MCV RBC AUTO: 84 FL
MONOCYTES # BLD AUTO: 1.5 K/UL
MONOCYTES NFR BLD: 13.8 %
NEUTROPHILS # BLD AUTO: 7.5 K/UL
NEUTROPHILS NFR BLD: 71.4 %
NITRITE UR QL STRIP: NEGATIVE
PH UR STRIP: 6 [PH] (ref 5–8)
PLATELET # BLD AUTO: 202 K/UL
PMV BLD AUTO: 9.3 FL
POTASSIUM SERPL-SCNC: 4.3 MMOL/L
PROT SERPL-MCNC: 7.5 G/DL
PROT UR QL STRIP: NEGATIVE
RBC # BLD AUTO: 4.53 M/UL
SODIUM SERPL-SCNC: 140 MMOL/L
SP GR UR STRIP: 1.02 (ref 1–1.03)
URN SPEC COLLECT METH UR: NORMAL
UROBILINOGEN UR STRIP-ACNC: NEGATIVE EU/DL
WBC # BLD AUTO: 10.47 K/UL

## 2018-10-18 PROCEDURE — 80053 COMPREHEN METABOLIC PANEL: CPT

## 2018-10-18 PROCEDURE — 63600175 PHARM REV CODE 636 W HCPCS: Performed by: EMERGENCY MEDICINE

## 2018-10-18 PROCEDURE — 81003 URINALYSIS AUTO W/O SCOPE: CPT

## 2018-10-18 PROCEDURE — 83690 ASSAY OF LIPASE: CPT

## 2018-10-18 PROCEDURE — 25000003 PHARM REV CODE 250: Performed by: EMERGENCY MEDICINE

## 2018-10-18 PROCEDURE — 99284 EMERGENCY DEPT VISIT MOD MDM: CPT | Mod: 25

## 2018-10-18 PROCEDURE — 96361 HYDRATE IV INFUSION ADD-ON: CPT

## 2018-10-18 PROCEDURE — 85025 COMPLETE CBC W/AUTO DIFF WBC: CPT

## 2018-10-18 PROCEDURE — 96375 TX/PRO/DX INJ NEW DRUG ADDON: CPT

## 2018-10-18 PROCEDURE — 96374 THER/PROPH/DIAG INJ IV PUSH: CPT

## 2018-10-18 RX ORDER — HYDROMORPHONE HYDROCHLORIDE 1 MG/ML
0.5 INJECTION, SOLUTION INTRAMUSCULAR; INTRAVENOUS; SUBCUTANEOUS
Status: COMPLETED | OUTPATIENT
Start: 2018-10-18 | End: 2018-10-18

## 2018-10-18 RX ORDER — DICYCLOMINE HYDROCHLORIDE 20 MG/1
20 TABLET ORAL 2 TIMES DAILY
Qty: 20 TABLET | Refills: 0 | Status: SHIPPED | OUTPATIENT
Start: 2018-10-18 | End: 2018-11-17

## 2018-10-18 RX ORDER — KETOROLAC TROMETHAMINE 30 MG/ML
15 INJECTION, SOLUTION INTRAMUSCULAR; INTRAVENOUS
Status: COMPLETED | OUTPATIENT
Start: 2018-10-18 | End: 2018-10-18

## 2018-10-18 RX ORDER — ONDANSETRON 4 MG/1
4 TABLET, ORALLY DISINTEGRATING ORAL EVERY 8 HOURS PRN
Qty: 10 TABLET | Refills: 0 | OUTPATIENT
Start: 2018-10-18 | End: 2021-01-06

## 2018-10-18 RX ORDER — ONDANSETRON 2 MG/ML
4 INJECTION INTRAMUSCULAR; INTRAVENOUS
Status: COMPLETED | OUTPATIENT
Start: 2018-10-18 | End: 2018-10-18

## 2018-10-18 RX ORDER — IBUPROFEN 600 MG/1
600 TABLET ORAL EVERY 6 HOURS PRN
Qty: 20 TABLET | Refills: 0 | OUTPATIENT
Start: 2018-10-18 | End: 2021-01-06

## 2018-10-18 RX ADMIN — ONDANSETRON 4 MG: 2 INJECTION INTRAMUSCULAR; INTRAVENOUS at 11:10

## 2018-10-18 RX ADMIN — HYDROMORPHONE HYDROCHLORIDE 0.5 MG: 1 INJECTION, SOLUTION INTRAMUSCULAR; INTRAVENOUS; SUBCUTANEOUS at 01:10

## 2018-10-18 RX ADMIN — SODIUM CHLORIDE 1000 ML: 0.9 INJECTION, SOLUTION INTRAVENOUS at 11:10

## 2018-10-18 RX ADMIN — KETOROLAC TROMETHAMINE 15 MG: 30 INJECTION, SOLUTION INTRAMUSCULAR at 03:10

## 2018-10-18 NOTE — ED TRIAGE NOTES
Pt Presents to ED  C/O N/V, chills, body aches,belly pain x since last night . Pt denies chest pain, SOB, diarrhea, dizziness, blood in urine/stool, cooperative but grimacing in pain

## 2018-10-18 NOTE — ED PROVIDER NOTES
"Encounter Date: 10/18/2018    SCRIBE #1 NOTE: I, Jeanette Reynoso, am scribing for, and in the presence of, Dr. Zaman.       History     Chief Complaint   Patient presents with    Abdominal Pain     pt with c/o vomiting , and abdomin pain x one day.     Time seen by provider: 11:14 AM    This is a 43 y.o. male who presents with complaint of abdominal pain that began this morning. The pain is described as "aching" and "burning." He has experienced similar pain with kidney stones. He reports nausea and vomiting. He denies fever, abdominal distention, diarrhea, or constipation. He reports history of hernia. He reports use of alcohol and denies use of tobacco or illicit drugs. He denies history of any abdominal surgeries.      The history is provided by the patient.     Review of patient's allergies indicates:  No Known Allergies  Past Medical History:   Diagnosis Date    Arthritis     Asthma     Renal disorder     Kidney stones    Sciatica      Past Surgical History:   Procedure Laterality Date    COLONOSCOPY N/A 3/27/2018    Procedure: COLONOSCOPY;  Surgeon: Sara Rios MD;  Location: [facility];  Service: Endoscopy;  Laterality: N/A;    COLONOSCOPY N/A 3/27/2018    Performed by Sara Rios MD at [facility]    ESOPHAGOGASTRODUODENOSCOPY (EGD) N/A 3/27/2018    Performed by Sara Rios MD at [facility]     Family History   Problem Relation Age of Onset    No Known Problems Mother     No Known Problems Father      Social History     Tobacco Use    Smoking status: Never Smoker    Smokeless tobacco: Never Used   Substance Use Topics    Alcohol use: Yes     Comment: occasionally.    Drug use: Yes     Types: Marijuana     Review of Systems   Constitutional: Negative for chills and fever.   HENT: Negative for congestion and sore throat.    Eyes: Negative for photophobia and redness.   Respiratory: Negative for cough and shortness of breath.    Cardiovascular: Negative for chest pain. "   Gastrointestinal: Positive for abdominal pain, nausea and vomiting. Negative for abdominal distention, blood in stool, constipation and diarrhea.   Genitourinary: Negative for dysuria.   Musculoskeletal: Negative for back pain.   Skin: Negative for rash.   Neurological: Negative for weakness, light-headedness and headaches.   Psychiatric/Behavioral: Negative for confusion.       Physical Exam     Initial Vitals [10/18/18 1031]   BP Pulse Resp Temp SpO2   130/64 64 18 97.8 °F (36.6 °C) 99 %      MAP       --         Physical Exam    Nursing note and vitals reviewed.  Constitutional: He appears well-developed and well-nourished. He is not diaphoretic. No distress.   HENT:   Head: Normocephalic and atraumatic.   Eyes: EOM are normal. No scleral icterus.   Neck: Normal range of motion. Neck supple.   Cardiovascular: Normal rate, regular rhythm and normal heart sounds. Exam reveals no gallop and no friction rub.    No murmur heard.  Pulmonary/Chest: Breath sounds normal. No respiratory distress. He has no wheezes. He has no rhonchi. He has no rales.   Abdominal: Soft. He exhibits no distension. There is no tenderness. There is no rebound and no guarding.   Musculoskeletal: Normal range of motion. He exhibits no edema or tenderness.   Neurological: He is alert and oriented to person, place, and time.   Skin: Skin is warm and dry.         ED Course   Procedures  Labs Reviewed   CBC W/ AUTO DIFFERENTIAL - Abnormal; Notable for the following components:       Result Value    RBC 4.53 (*)     Hemoglobin 13.5 (*)     Hematocrit 38.1 (*)     Mono # 1.5 (*)     Lymph% 14.1 (*)     All other components within normal limits   COMPREHENSIVE METABOLIC PANEL - Abnormal; Notable for the following components:    Glucose 142 (*)     All other components within normal limits   LIPASE   URINALYSIS, REFLEX TO URINE CULTURE    Narrative:     Preferred Collection Type->Urine, Clean Catch          Imaging Results          US Scrotum And  Testicles (Final result)  Result time 10/18/18 15:01:04    Final result by Dylan Castillo MD (10/18/18 15:01:04)                 Impression:      No significant abnormality.      Electronically signed by: Dylan Castillo MD  Date:    10/18/2018  Time:    15:01             Narrative:    EXAMINATION:  US SCROTUM AND TESTICLES    CLINICAL HISTORY:  Pain, unspecified    TECHNIQUE:  Sonography of the scrotum and testes.    COMPARISON:  None.    FINDINGS:  Right Testicle:    Size: 3.6 x 2.4 x 2.6 cm    Appearance: Small echogenic focus noted within the scrotal echotexture otherwise grossly unremarkable.    Flow: Normal arterial and venous flow    Epididymis: Normal    Hydrocele: None    Varicocele: None    Left Testicle:    Size: 4.8 x 2.7 x 2.6 cm    Appearance: Non-specific echogenic focus is noted within the scrotal echotexture which is non-specific within otherwise unremarkable echotexture.    Flow: Normal arterial and venous flow    Epididymis: Normal    Hydrocele: None    Varicocele: None                               CT Renal Stone Study ABD Pelvis WO (Final result)  Result time 10/18/18 11:56:58    Final result by Ludwig Dyson MD (10/18/18 11:56:58)                 Impression:      1. Left nonobstructive nephrolithiasis, no findings to suggest obstructive uropathy bilaterally.  2. Colonic diverticulosis without findings to suggest diverticulitis.  3. Additional findings above.      Electronically signed by: Ludwig Dyson MD  Date:    10/18/2018  Time:    11:56             Narrative:    EXAMINATION:  CT RENAL STONE STUDY ABD PELVIS WO    CLINICAL HISTORY:  Abdominal Pain, HX of Kidney Stone;    TECHNIQUE:  Low dose axial images, sagittal and coronal reformations were obtained from the lung bases to the pubic symphysis.  Contrast was not administered.    COMPARISON:  03/06/2018    FINDINGS:  Images of the lower thorax are grossly unremarkable.    The liver, spleen, pancreas, gallbladder and adrenal  glands have a grossly unremarkable noncontrast appearance.  There is no biliary dilation or ascites.  There is a minimal hiatal hernia.    There is left nonobstructive nephrolithiasis.  There is no hydronephrosis.  No right nephrolithiasis.  The bilateral ureters are unremarkable without calculi seen.  The urinary bladder is unremarkable.  The prostate is prominent.    There are a few scattered colonic diverticula without inflammation.  There is moderate stool in the colon.  The terminal ileum and appendix are unremarkable.  The small bowel is grossly unremarkable.  No focal organized pelvic fluid collection.    Degenerative changes are noted of the spine.  There is grade 1 anterolisthesis of L5 on S1.  No significant inguinal lymphadenopathy.                                 Medical Decision Making:   Clinical Tests:   Lab Tests: Ordered and Reviewed  Radiological Study: Ordered and Reviewed            Scribe Attestation:   Scribe #1: I performed the above scribed service and the documentation accurately describes the services I performed. I attest to the accuracy of the note.    Attending Attestation:           Physician Attestation for Scribe:  Physician Attestation Statement for Scribe #1: I, Dr. Zaman, reviewed documentation, as scribed by Jeanette Reynoso in my presence, and it is both accurate and complete.         Attending ED Notes:   Emergent evaluation a 43-year-old male with abdominal pain.  Patient is afebrile, nontoxic appearing with stable vital signs. No acute findings on urinary analysis.  No elevation of white blood cell count.  H&H 13.5 and 38.1.  No acute findings on CMP.  Lipase is 46.  No acute findings on CT renal stone study.  Patient is found to have diverticulosis without evidence of diverticulitis.  Patient also found to have left nonobstructive nephrolithiasis.  No acute findings on testicular ultrasound.  The patient is extensively counseled on his diagnosis and treatment including all  diagnostic, laboratory and physical exam findings.  The patient discharged good condition and directed to follow up with Gastroenterology and his primary care physicians in the next 24-48 hours.             Clinical Impression:     1. Lower abdominal pain    2. Pain    3. Inguinal pain, unspecified laterality    4. Testicular pain, unspecified                                 Henok Cee MD  10/22/18 2046

## 2018-10-18 NOTE — ED NOTES
PT LYING IN BED. PT APPEARS AGITATED. STATES PAIN LEVEL 10/10. POC DISCUSSED. PROVIDER MADE AWARE. NEEDS ADDRESSED. CARDIAC AND VS MONITORING IN PROGRESS. CALL LIGHT WITHIN REACH.

## 2021-01-06 ENCOUNTER — HOSPITAL ENCOUNTER (EMERGENCY)
Facility: OTHER | Age: 46
Discharge: HOME | End: 2021-01-06
Attending: EMERGENCY MEDICINE
Payer: COMMERCIAL

## 2021-01-06 VITALS
SYSTOLIC BLOOD PRESSURE: 117 MMHG | HEART RATE: 72 BPM | OXYGEN SATURATION: 100 % | WEIGHT: 220 LBS | DIASTOLIC BLOOD PRESSURE: 65 MMHG | RESPIRATION RATE: 16 BRPM | HEIGHT: 73 IN | TEMPERATURE: 98 F | BODY MASS INDEX: 29.16 KG/M2

## 2021-01-06 DIAGNOSIS — N20.1 URETEROLITHIASIS: Primary | ICD-10-CM

## 2021-01-06 DIAGNOSIS — N20.0 KIDNEY STONE: ICD-10-CM

## 2021-01-06 DIAGNOSIS — R10.9 RIGHT FLANK PAIN: ICD-10-CM

## 2021-01-06 LAB
ALBUMIN SERPL BCP-MCNC: 4.1 G/DL (ref 3.5–5.2)
ALP SERPL-CCNC: 80 U/L (ref 55–135)
ALT SERPL W/O P-5'-P-CCNC: 17 U/L (ref 10–44)
ANION GAP SERPL CALC-SCNC: 11 MMOL/L (ref 8–16)
AST SERPL-CCNC: 18 U/L (ref 10–40)
BACTERIA #/AREA URNS HPF: ABNORMAL /HPF
BASOPHILS # BLD AUTO: 0.03 K/UL (ref 0–0.2)
BASOPHILS NFR BLD: 0.2 % (ref 0–1.9)
BILIRUB SERPL-MCNC: 0.6 MG/DL (ref 0.1–1)
BILIRUB UR QL STRIP: NEGATIVE
BUN SERPL-MCNC: 11 MG/DL (ref 6–20)
CALCIUM SERPL-MCNC: 9 MG/DL (ref 8.7–10.5)
CHLORIDE SERPL-SCNC: 101 MMOL/L (ref 95–110)
CLARITY UR: CLEAR
CO2 SERPL-SCNC: 22 MMOL/L (ref 23–29)
COLOR UR: YELLOW
CREAT SERPL-MCNC: 1.2 MG/DL (ref 0.5–1.4)
CTP QC/QA: YES
DIFFERENTIAL METHOD: ABNORMAL
EOSINOPHIL # BLD AUTO: 0 K/UL (ref 0–0.5)
EOSINOPHIL NFR BLD: 0 % (ref 0–8)
ERYTHROCYTE [DISTWIDTH] IN BLOOD BY AUTOMATED COUNT: 13.3 % (ref 11.5–14.5)
EST. GFR  (AFRICAN AMERICAN): >60 ML/MIN/1.73 M^2
EST. GFR  (NON AFRICAN AMERICAN): >60 ML/MIN/1.73 M^2
GLUCOSE SERPL-MCNC: 158 MG/DL (ref 70–110)
GLUCOSE UR QL STRIP: NEGATIVE
HCT VFR BLD AUTO: 38.7 % (ref 40–54)
HGB BLD-MCNC: 13.7 G/DL (ref 14–18)
HGB UR QL STRIP: ABNORMAL
HYALINE CASTS #/AREA URNS LPF: 0 /LPF
IMM GRANULOCYTES # BLD AUTO: 0.25 K/UL (ref 0–0.04)
IMM GRANULOCYTES NFR BLD AUTO: 1.6 % (ref 0–0.5)
KETONES UR QL STRIP: NEGATIVE
LEUKOCYTE ESTERASE UR QL STRIP: NEGATIVE
LIPASE SERPL-CCNC: 7 U/L (ref 4–60)
LYMPHOCYTES # BLD AUTO: 0.6 K/UL (ref 1–4.8)
LYMPHOCYTES NFR BLD: 3.9 % (ref 18–48)
MCH RBC QN AUTO: 29 PG (ref 27–31)
MCHC RBC AUTO-ENTMCNC: 35.4 G/DL (ref 32–36)
MCV RBC AUTO: 82 FL (ref 82–98)
MICROSCOPIC COMMENT: ABNORMAL
MONOCYTES # BLD AUTO: 0.8 K/UL (ref 0.3–1)
MONOCYTES NFR BLD: 4.7 % (ref 4–15)
NEUTROPHILS # BLD AUTO: 14.4 K/UL (ref 1.8–7.7)
NEUTROPHILS NFR BLD: 89.6 % (ref 38–73)
NITRITE UR QL STRIP: NEGATIVE
NRBC BLD-RTO: 0 /100 WBC
PH UR STRIP: 8 [PH] (ref 5–8)
PLATELET # BLD AUTO: 212 K/UL (ref 150–350)
PMV BLD AUTO: 9.5 FL (ref 9.2–12.9)
POTASSIUM SERPL-SCNC: 4 MMOL/L (ref 3.5–5.1)
PROT SERPL-MCNC: 7.7 G/DL (ref 6–8.4)
PROT UR QL STRIP: NEGATIVE
RBC # BLD AUTO: 4.72 M/UL (ref 4.6–6.2)
RBC #/AREA URNS HPF: 25 /HPF (ref 0–4)
SARS-COV-2 RDRP RESP QL NAA+PROBE: NEGATIVE
SODIUM SERPL-SCNC: 134 MMOL/L (ref 136–145)
SP GR UR STRIP: 1.02 (ref 1–1.03)
SQUAMOUS #/AREA URNS HPF: 3 /HPF
URN SPEC COLLECT METH UR: ABNORMAL
UROBILINOGEN UR STRIP-ACNC: NEGATIVE EU/DL
WBC # BLD AUTO: 16.08 K/UL (ref 3.9–12.7)
WBC #/AREA URNS HPF: 1 /HPF (ref 0–5)

## 2021-01-06 PROCEDURE — 96375 TX/PRO/DX INJ NEW DRUG ADDON: CPT

## 2021-01-06 PROCEDURE — 80053 COMPREHEN METABOLIC PANEL: CPT

## 2021-01-06 PROCEDURE — 81000 URINALYSIS NONAUTO W/SCOPE: CPT

## 2021-01-06 PROCEDURE — 85025 COMPLETE CBC W/AUTO DIFF WBC: CPT

## 2021-01-06 PROCEDURE — 99284 EMERGENCY DEPT VISIT MOD MDM: CPT | Mod: 25

## 2021-01-06 PROCEDURE — 63600175 PHARM REV CODE 636 W HCPCS: Performed by: EMERGENCY MEDICINE

## 2021-01-06 PROCEDURE — 96361 HYDRATE IV INFUSION ADD-ON: CPT

## 2021-01-06 PROCEDURE — 96374 THER/PROPH/DIAG INJ IV PUSH: CPT

## 2021-01-06 PROCEDURE — 25000003 PHARM REV CODE 250: Performed by: EMERGENCY MEDICINE

## 2021-01-06 PROCEDURE — U0002 COVID-19 LAB TEST NON-CDC: HCPCS | Performed by: EMERGENCY MEDICINE

## 2021-01-06 PROCEDURE — 96376 TX/PRO/DX INJ SAME DRUG ADON: CPT

## 2021-01-06 PROCEDURE — 83690 ASSAY OF LIPASE: CPT

## 2021-01-06 RX ORDER — IBUPROFEN 600 MG/1
600 TABLET ORAL EVERY 6 HOURS PRN
Qty: 20 TABLET | Refills: 0 | Status: SHIPPED | OUTPATIENT
Start: 2021-01-06 | End: 2021-08-10

## 2021-01-06 RX ORDER — KETOROLAC TROMETHAMINE 30 MG/ML
15 INJECTION, SOLUTION INTRAMUSCULAR; INTRAVENOUS
Status: COMPLETED | OUTPATIENT
Start: 2021-01-06 | End: 2021-01-06

## 2021-01-06 RX ORDER — HYDROMORPHONE HYDROCHLORIDE 1 MG/ML
1 INJECTION, SOLUTION INTRAMUSCULAR; INTRAVENOUS; SUBCUTANEOUS
Status: COMPLETED | OUTPATIENT
Start: 2021-01-06 | End: 2021-01-06

## 2021-01-06 RX ORDER — OXYCODONE AND ACETAMINOPHEN 5; 325 MG/1; MG/1
2 TABLET ORAL
Status: COMPLETED | OUTPATIENT
Start: 2021-01-06 | End: 2021-01-06

## 2021-01-06 RX ORDER — HYDROMORPHONE HYDROCHLORIDE 1 MG/ML
0.5 INJECTION, SOLUTION INTRAMUSCULAR; INTRAVENOUS; SUBCUTANEOUS
Status: COMPLETED | OUTPATIENT
Start: 2021-01-06 | End: 2021-01-06

## 2021-01-06 RX ORDER — TAMSULOSIN HYDROCHLORIDE 0.4 MG/1
0.4 CAPSULE ORAL DAILY
Qty: 10 CAPSULE | Refills: 0 | Status: SHIPPED | OUTPATIENT
Start: 2021-01-06 | End: 2021-08-10

## 2021-01-06 RX ORDER — ONDANSETRON 4 MG/1
4 TABLET, ORALLY DISINTEGRATING ORAL EVERY 8 HOURS PRN
Qty: 12 TABLET | Refills: 0 | Status: SHIPPED | OUTPATIENT
Start: 2021-01-06 | End: 2021-09-22

## 2021-01-06 RX ORDER — TAMSULOSIN HYDROCHLORIDE 0.4 MG/1
0.4 CAPSULE ORAL
Status: COMPLETED | OUTPATIENT
Start: 2021-01-06 | End: 2021-01-06

## 2021-01-06 RX ORDER — ONDANSETRON 2 MG/ML
4 INJECTION INTRAMUSCULAR; INTRAVENOUS
Status: COMPLETED | OUTPATIENT
Start: 2021-01-06 | End: 2021-01-06

## 2021-01-06 RX ORDER — OXYCODONE AND ACETAMINOPHEN 5; 325 MG/1; MG/1
1 TABLET ORAL EVERY 4 HOURS PRN
Qty: 20 TABLET | Refills: 0 | Status: SHIPPED | OUTPATIENT
Start: 2021-01-06 | End: 2021-08-10

## 2021-01-06 RX ADMIN — TAMSULOSIN HYDROCHLORIDE 0.4 MG: 0.4 CAPSULE ORAL at 11:01

## 2021-01-06 RX ADMIN — OXYCODONE HYDROCHLORIDE AND ACETAMINOPHEN 2 TABLET: 5; 325 TABLET ORAL at 11:01

## 2021-01-06 RX ADMIN — KETOROLAC TROMETHAMINE 15 MG: 30 INJECTION, SOLUTION INTRAMUSCULAR at 09:01

## 2021-01-06 RX ADMIN — SODIUM CHLORIDE 1000 ML: 0.9 INJECTION, SOLUTION INTRAVENOUS at 08:01

## 2021-01-06 RX ADMIN — HYDROMORPHONE HYDROCHLORIDE 0.5 MG: 1 INJECTION, SOLUTION INTRAMUSCULAR; INTRAVENOUS; SUBCUTANEOUS at 09:01

## 2021-01-06 RX ADMIN — HYDROMORPHONE HYDROCHLORIDE 1 MG: 1 INJECTION, SOLUTION INTRAMUSCULAR; INTRAVENOUS; SUBCUTANEOUS at 11:01

## 2021-01-06 RX ADMIN — KETOROLAC TROMETHAMINE 15 MG: 30 INJECTION, SOLUTION INTRAMUSCULAR at 11:01

## 2021-01-06 RX ADMIN — ONDANSETRON 4 MG: 2 INJECTION INTRAMUSCULAR; INTRAVENOUS at 09:01

## 2021-01-06 RX ADMIN — SODIUM CHLORIDE 1000 ML: 0.9 INJECTION, SOLUTION INTRAVENOUS at 11:01

## 2021-01-07 ENCOUNTER — TELEPHONE (OUTPATIENT)
Dept: EMERGENCY MEDICINE | Facility: OTHER | Age: 46
End: 2021-01-07

## 2021-02-02 ENCOUNTER — TELEPHONE (OUTPATIENT)
Dept: ADMINISTRATIVE | Facility: OTHER | Age: 46
End: 2021-02-02

## 2021-07-28 ENCOUNTER — TELEPHONE (OUTPATIENT)
Dept: INTERNAL MEDICINE | Facility: CLINIC | Age: 46
End: 2021-07-28

## 2021-07-28 ENCOUNTER — OFFICE VISIT (OUTPATIENT)
Dept: INTERNAL MEDICINE | Facility: CLINIC | Age: 46
End: 2021-07-28
Payer: COMMERCIAL

## 2021-07-28 ENCOUNTER — HOSPITAL ENCOUNTER (OUTPATIENT)
Dept: RADIOLOGY | Facility: HOSPITAL | Age: 46
Discharge: HOME | End: 2021-07-28
Attending: INTERNAL MEDICINE
Payer: COMMERCIAL

## 2021-07-28 VITALS
HEART RATE: 69 BPM | BODY MASS INDEX: 27.82 KG/M2 | RESPIRATION RATE: 15 BRPM | SYSTOLIC BLOOD PRESSURE: 120 MMHG | HEIGHT: 73 IN | DIASTOLIC BLOOD PRESSURE: 70 MMHG | TEMPERATURE: 97 F | WEIGHT: 209.88 LBS | OXYGEN SATURATION: 98 %

## 2021-07-28 DIAGNOSIS — M54.6 THORACOLUMBAR BACK PAIN: Primary | ICD-10-CM

## 2021-07-28 DIAGNOSIS — M54.50 THORACOLUMBAR BACK PAIN: ICD-10-CM

## 2021-07-28 DIAGNOSIS — M54.6 THORACOLUMBAR BACK PAIN: ICD-10-CM

## 2021-07-28 DIAGNOSIS — M54.50 THORACOLUMBAR BACK PAIN: Primary | ICD-10-CM

## 2021-07-28 PROCEDURE — 72080 XR THORACOLUMBAR SPINE AP LATERAL: ICD-10-PCS | Mod: 26,,, | Performed by: RADIOLOGY

## 2021-07-28 PROCEDURE — 99204 PR OFFICE/OUTPT VISIT, NEW, LEVL IV, 45-59 MIN: ICD-10-PCS | Mod: S$GLB,,, | Performed by: INTERNAL MEDICINE

## 2021-07-28 PROCEDURE — 99204 OFFICE O/P NEW MOD 45 MIN: CPT | Mod: S$GLB,,, | Performed by: INTERNAL MEDICINE

## 2021-07-28 PROCEDURE — 3008F PR BODY MASS INDEX (BMI) DOCUMENTED: ICD-10-PCS | Mod: CPTII,S$GLB,, | Performed by: INTERNAL MEDICINE

## 2021-07-28 PROCEDURE — 1126F PR PAIN SEVERITY QUANTIFIED, NO PAIN PRESENT: ICD-10-PCS | Mod: CPTII,S$GLB,, | Performed by: INTERNAL MEDICINE

## 2021-07-28 PROCEDURE — 1126F AMNT PAIN NOTED NONE PRSNT: CPT | Mod: CPTII,S$GLB,, | Performed by: INTERNAL MEDICINE

## 2021-07-28 PROCEDURE — 3008F BODY MASS INDEX DOCD: CPT | Mod: CPTII,S$GLB,, | Performed by: INTERNAL MEDICINE

## 2021-07-28 PROCEDURE — 99999 PR PBB SHADOW E&M-EST. PATIENT-LVL V: ICD-10-PCS | Mod: PBBFAC,,, | Performed by: INTERNAL MEDICINE

## 2021-07-28 PROCEDURE — 72080 X-RAY EXAM THORACOLMB 2/> VW: CPT | Mod: TC,PO

## 2021-07-28 PROCEDURE — 99999 PR PBB SHADOW E&M-EST. PATIENT-LVL V: CPT | Mod: PBBFAC,,, | Performed by: INTERNAL MEDICINE

## 2021-07-28 PROCEDURE — 1159F MED LIST DOCD IN RCRD: CPT | Mod: CPTII,S$GLB,, | Performed by: INTERNAL MEDICINE

## 2021-07-28 PROCEDURE — 72080 X-RAY EXAM THORACOLMB 2/> VW: CPT | Mod: 26,,, | Performed by: RADIOLOGY

## 2021-07-28 PROCEDURE — 1159F PR MEDICATION LIST DOCUMENTED IN MEDICAL RECORD: ICD-10-PCS | Mod: CPTII,S$GLB,, | Performed by: INTERNAL MEDICINE

## 2021-07-28 PROCEDURE — 1160F RVW MEDS BY RX/DR IN RCRD: CPT | Mod: CPTII,S$GLB,, | Performed by: INTERNAL MEDICINE

## 2021-07-28 PROCEDURE — 1160F PR REVIEW ALL MEDS BY PRESCRIBER/CLIN PHARMACIST DOCUMENTED: ICD-10-PCS | Mod: CPTII,S$GLB,, | Performed by: INTERNAL MEDICINE

## 2021-07-28 RX ORDER — NABUMETONE 750 MG/1
750 TABLET, FILM COATED ORAL 2 TIMES DAILY
Qty: 60 TABLET | Refills: 1 | Status: SHIPPED | OUTPATIENT
Start: 2021-07-28 | End: 2021-08-10

## 2021-07-28 RX ORDER — CYCLOBENZAPRINE HCL 10 MG
10 TABLET ORAL 3 TIMES DAILY PRN
Qty: 60 TABLET | Refills: 1 | Status: SHIPPED | OUTPATIENT
Start: 2021-07-28 | End: 2021-08-27

## 2021-08-09 ENCOUNTER — TELEPHONE (OUTPATIENT)
Dept: PAIN MEDICINE | Facility: CLINIC | Age: 46
End: 2021-08-09

## 2021-08-10 ENCOUNTER — OFFICE VISIT (OUTPATIENT)
Dept: PAIN MEDICINE | Facility: CLINIC | Age: 46
End: 2021-08-10
Attending: ANESTHESIOLOGY
Payer: COMMERCIAL

## 2021-08-10 VITALS
WEIGHT: 216.25 LBS | HEART RATE: 59 BPM | DIASTOLIC BLOOD PRESSURE: 76 MMHG | BODY MASS INDEX: 28.66 KG/M2 | HEIGHT: 73 IN | RESPIRATION RATE: 18 BRPM | SYSTOLIC BLOOD PRESSURE: 110 MMHG

## 2021-08-10 DIAGNOSIS — M25.559 HIP PAIN: ICD-10-CM

## 2021-08-10 DIAGNOSIS — M54.50 THORACOLUMBAR BACK PAIN: ICD-10-CM

## 2021-08-10 DIAGNOSIS — M54.16 LUMBAR RADICULOPATHY: Primary | ICD-10-CM

## 2021-08-10 DIAGNOSIS — M54.6 THORACOLUMBAR BACK PAIN: ICD-10-CM

## 2021-08-10 PROCEDURE — 99999 PR PBB SHADOW E&M-EST. PATIENT-LVL IV: ICD-10-PCS | Mod: PBBFAC,,, | Performed by: ANESTHESIOLOGY

## 2021-08-10 PROCEDURE — 1160F PR REVIEW ALL MEDS BY PRESCRIBER/CLIN PHARMACIST DOCUMENTED: ICD-10-PCS | Mod: CPTII,S$GLB,, | Performed by: ANESTHESIOLOGY

## 2021-08-10 PROCEDURE — 3074F SYST BP LT 130 MM HG: CPT | Mod: CPTII,S$GLB,, | Performed by: ANESTHESIOLOGY

## 2021-08-10 PROCEDURE — 99245 OFF/OP CONSLTJ NEW/EST HI 55: CPT | Mod: S$GLB,,, | Performed by: ANESTHESIOLOGY

## 2021-08-10 PROCEDURE — 3078F DIAST BP <80 MM HG: CPT | Mod: CPTII,S$GLB,, | Performed by: ANESTHESIOLOGY

## 2021-08-10 PROCEDURE — 3008F PR BODY MASS INDEX (BMI) DOCUMENTED: ICD-10-PCS | Mod: CPTII,S$GLB,, | Performed by: ANESTHESIOLOGY

## 2021-08-10 PROCEDURE — 3008F BODY MASS INDEX DOCD: CPT | Mod: CPTII,S$GLB,, | Performed by: ANESTHESIOLOGY

## 2021-08-10 PROCEDURE — 99999 PR PBB SHADOW E&M-EST. PATIENT-LVL IV: CPT | Mod: PBBFAC,,, | Performed by: ANESTHESIOLOGY

## 2021-08-10 PROCEDURE — 1160F RVW MEDS BY RX/DR IN RCRD: CPT | Mod: CPTII,S$GLB,, | Performed by: ANESTHESIOLOGY

## 2021-08-10 PROCEDURE — 1125F AMNT PAIN NOTED PAIN PRSNT: CPT | Mod: CPTII,S$GLB,, | Performed by: ANESTHESIOLOGY

## 2021-08-10 PROCEDURE — 1159F PR MEDICATION LIST DOCUMENTED IN MEDICAL RECORD: ICD-10-PCS | Mod: CPTII,S$GLB,, | Performed by: ANESTHESIOLOGY

## 2021-08-10 PROCEDURE — 99245 PR OFFICE CONSULTATION,LEVEL V: ICD-10-PCS | Mod: S$GLB,,, | Performed by: ANESTHESIOLOGY

## 2021-08-10 PROCEDURE — 3074F PR MOST RECENT SYSTOLIC BLOOD PRESSURE < 130 MM HG: ICD-10-PCS | Mod: CPTII,S$GLB,, | Performed by: ANESTHESIOLOGY

## 2021-08-10 PROCEDURE — 1159F MED LIST DOCD IN RCRD: CPT | Mod: CPTII,S$GLB,, | Performed by: ANESTHESIOLOGY

## 2021-08-10 PROCEDURE — 3078F PR MOST RECENT DIASTOLIC BLOOD PRESSURE < 80 MM HG: ICD-10-PCS | Mod: CPTII,S$GLB,, | Performed by: ANESTHESIOLOGY

## 2021-08-10 PROCEDURE — 1125F PR PAIN SEVERITY QUANTIFIED, PAIN PRESENT: ICD-10-PCS | Mod: CPTII,S$GLB,, | Performed by: ANESTHESIOLOGY

## 2021-08-10 RX ORDER — GABAPENTIN 100 MG/1
CAPSULE ORAL
Qty: 270 CAPSULE | Refills: 2 | Status: SHIPPED | OUTPATIENT
Start: 2021-08-10 | End: 2024-02-20

## 2021-08-11 ENCOUNTER — HOSPITAL ENCOUNTER (OUTPATIENT)
Dept: RADIOLOGY | Facility: OTHER | Age: 46
Discharge: HOME | End: 2021-08-11
Attending: ANESTHESIOLOGY
Payer: COMMERCIAL

## 2021-08-11 DIAGNOSIS — M25.559 HIP PAIN: ICD-10-CM

## 2021-08-11 DIAGNOSIS — M54.16 LUMBAR RADICULOPATHY: ICD-10-CM

## 2021-08-11 PROCEDURE — 73502 XR HIP WITH PELVIS WHEN PERFORMED, 2 OR 3  VIEWS RIGHT: ICD-10-PCS | Mod: 26,RT,, | Performed by: RADIOLOGY

## 2021-08-11 PROCEDURE — 73502 X-RAY EXAM HIP UNI 2-3 VIEWS: CPT | Mod: 26,RT,, | Performed by: RADIOLOGY

## 2021-08-11 PROCEDURE — 73502 X-RAY EXAM HIP UNI 2-3 VIEWS: CPT | Mod: TC,FY,RT

## 2021-09-20 ENCOUNTER — TELEPHONE (OUTPATIENT)
Dept: PAIN MEDICINE | Facility: CLINIC | Age: 46
End: 2021-09-20

## 2021-09-21 ENCOUNTER — OFFICE VISIT (OUTPATIENT)
Dept: PAIN MEDICINE | Facility: CLINIC | Age: 46
End: 2021-09-21
Payer: COMMERCIAL

## 2021-09-21 VITALS
RESPIRATION RATE: 17 BRPM | HEIGHT: 73 IN | HEART RATE: 65 BPM | BODY MASS INDEX: 28.87 KG/M2 | SYSTOLIC BLOOD PRESSURE: 123 MMHG | WEIGHT: 217.81 LBS | DIASTOLIC BLOOD PRESSURE: 86 MMHG

## 2021-09-21 DIAGNOSIS — M54.6 THORACOLUMBAR BACK PAIN: Primary | ICD-10-CM

## 2021-09-21 DIAGNOSIS — M54.50 THORACOLUMBAR BACK PAIN: Primary | ICD-10-CM

## 2021-09-21 DIAGNOSIS — M47.816 LUMBAR SPONDYLOSIS: ICD-10-CM

## 2021-09-21 DIAGNOSIS — M54.16 LUMBAR RADICULOPATHY: ICD-10-CM

## 2021-09-21 PROCEDURE — 99999 PR PBB SHADOW E&M-EST. PATIENT-LVL III: CPT | Mod: PBBFAC,,, | Performed by: NURSE PRACTITIONER

## 2021-09-21 PROCEDURE — 3074F SYST BP LT 130 MM HG: CPT | Mod: CPTII,S$GLB,, | Performed by: NURSE PRACTITIONER

## 2021-09-21 PROCEDURE — 99213 PR OFFICE/OUTPT VISIT, EST, LEVL III, 20-29 MIN: ICD-10-PCS | Mod: S$GLB,,, | Performed by: NURSE PRACTITIONER

## 2021-09-21 PROCEDURE — 3008F BODY MASS INDEX DOCD: CPT | Mod: CPTII,S$GLB,, | Performed by: NURSE PRACTITIONER

## 2021-09-21 PROCEDURE — 99213 OFFICE O/P EST LOW 20 MIN: CPT | Mod: S$GLB,,, | Performed by: NURSE PRACTITIONER

## 2021-09-21 PROCEDURE — 1159F MED LIST DOCD IN RCRD: CPT | Mod: CPTII,S$GLB,, | Performed by: NURSE PRACTITIONER

## 2021-09-21 PROCEDURE — 3079F PR MOST RECENT DIASTOLIC BLOOD PRESSURE 80-89 MM HG: ICD-10-PCS | Mod: CPTII,S$GLB,, | Performed by: NURSE PRACTITIONER

## 2021-09-21 PROCEDURE — 3008F PR BODY MASS INDEX (BMI) DOCUMENTED: ICD-10-PCS | Mod: CPTII,S$GLB,, | Performed by: NURSE PRACTITIONER

## 2021-09-21 PROCEDURE — 3079F DIAST BP 80-89 MM HG: CPT | Mod: CPTII,S$GLB,, | Performed by: NURSE PRACTITIONER

## 2021-09-21 PROCEDURE — 3074F PR MOST RECENT SYSTOLIC BLOOD PRESSURE < 130 MM HG: ICD-10-PCS | Mod: CPTII,S$GLB,, | Performed by: NURSE PRACTITIONER

## 2021-09-21 PROCEDURE — 99999 PR PBB SHADOW E&M-EST. PATIENT-LVL III: ICD-10-PCS | Mod: PBBFAC,,, | Performed by: NURSE PRACTITIONER

## 2021-09-21 PROCEDURE — 1159F PR MEDICATION LIST DOCUMENTED IN MEDICAL RECORD: ICD-10-PCS | Mod: CPTII,S$GLB,, | Performed by: NURSE PRACTITIONER

## 2021-09-21 PROCEDURE — 1160F PR REVIEW ALL MEDS BY PRESCRIBER/CLIN PHARMACIST DOCUMENTED: ICD-10-PCS | Mod: CPTII,S$GLB,, | Performed by: NURSE PRACTITIONER

## 2021-09-21 PROCEDURE — 1160F RVW MEDS BY RX/DR IN RCRD: CPT | Mod: CPTII,S$GLB,, | Performed by: NURSE PRACTITIONER

## 2021-09-21 RX ORDER — TIZANIDINE 4 MG/1
4 TABLET ORAL EVERY 8 HOURS PRN
Qty: 90 TABLET | Refills: 2 | Status: SHIPPED | OUTPATIENT
Start: 2021-09-21 | End: 2021-10-21

## 2021-10-18 ENCOUNTER — PATIENT MESSAGE (OUTPATIENT)
Dept: PAIN MEDICINE | Facility: CLINIC | Age: 46
End: 2021-10-18
Payer: COMMERCIAL

## 2022-02-01 DIAGNOSIS — M51.36 DDD (DEGENERATIVE DISC DISEASE), LUMBAR: Primary | ICD-10-CM

## 2022-02-03 ENCOUNTER — HOSPITAL ENCOUNTER (OUTPATIENT)
Dept: RADIOLOGY | Facility: HOSPITAL | Age: 47
Discharge: HOME | End: 2022-02-03
Attending: ORTHOPAEDIC SURGERY
Payer: COMMERCIAL

## 2022-02-03 ENCOUNTER — OFFICE VISIT (OUTPATIENT)
Dept: ORTHOPEDICS | Facility: CLINIC | Age: 47
End: 2022-02-03
Payer: COMMERCIAL

## 2022-02-03 VITALS — BODY MASS INDEX: 27.3 KG/M2 | HEIGHT: 74 IN | WEIGHT: 212.75 LBS

## 2022-02-03 DIAGNOSIS — M47.816 LUMBAR SPONDYLOSIS: ICD-10-CM

## 2022-02-03 DIAGNOSIS — M51.36 DDD (DEGENERATIVE DISC DISEASE), LUMBAR: Primary | ICD-10-CM

## 2022-02-03 DIAGNOSIS — M51.36 DDD (DEGENERATIVE DISC DISEASE), LUMBAR: ICD-10-CM

## 2022-02-03 PROCEDURE — 72120 XR LUMBAR SPINE FLEXION AND EXTENSION ONLY: ICD-10-PCS | Mod: 26,,, | Performed by: RADIOLOGY

## 2022-02-03 PROCEDURE — 1159F PR MEDICATION LIST DOCUMENTED IN MEDICAL RECORD: ICD-10-PCS | Mod: CPTII,S$GLB,, | Performed by: ORTHOPAEDIC SURGERY

## 2022-02-03 PROCEDURE — 1159F MED LIST DOCD IN RCRD: CPT | Mod: CPTII,S$GLB,, | Performed by: ORTHOPAEDIC SURGERY

## 2022-02-03 PROCEDURE — 3008F BODY MASS INDEX DOCD: CPT | Mod: CPTII,S$GLB,, | Performed by: ORTHOPAEDIC SURGERY

## 2022-02-03 PROCEDURE — 3008F PR BODY MASS INDEX (BMI) DOCUMENTED: ICD-10-PCS | Mod: CPTII,S$GLB,, | Performed by: ORTHOPAEDIC SURGERY

## 2022-02-03 PROCEDURE — 99204 PR OFFICE/OUTPT VISIT, NEW, LEVL IV, 45-59 MIN: ICD-10-PCS | Mod: S$GLB,,, | Performed by: ORTHOPAEDIC SURGERY

## 2022-02-03 PROCEDURE — 1160F PR REVIEW ALL MEDS BY PRESCRIBER/CLIN PHARMACIST DOCUMENTED: ICD-10-PCS | Mod: CPTII,S$GLB,, | Performed by: ORTHOPAEDIC SURGERY

## 2022-02-03 PROCEDURE — 99999 PR PBB SHADOW E&M-EST. PATIENT-LVL III: ICD-10-PCS | Mod: PBBFAC,,, | Performed by: ORTHOPAEDIC SURGERY

## 2022-02-03 PROCEDURE — 99204 OFFICE O/P NEW MOD 45 MIN: CPT | Mod: S$GLB,,, | Performed by: ORTHOPAEDIC SURGERY

## 2022-02-03 PROCEDURE — 99999 PR PBB SHADOW E&M-EST. PATIENT-LVL III: CPT | Mod: PBBFAC,,, | Performed by: ORTHOPAEDIC SURGERY

## 2022-02-03 PROCEDURE — 72120 X-RAY BEND ONLY L-S SPINE: CPT | Mod: TC

## 2022-02-03 PROCEDURE — 1160F RVW MEDS BY RX/DR IN RCRD: CPT | Mod: CPTII,S$GLB,, | Performed by: ORTHOPAEDIC SURGERY

## 2022-02-03 PROCEDURE — 72120 X-RAY BEND ONLY L-S SPINE: CPT | Mod: 26,,, | Performed by: RADIOLOGY

## 2022-02-03 RX ORDER — CYCLOBENZAPRINE HCL 10 MG
10 TABLET ORAL 3 TIMES DAILY PRN
Qty: 60 TABLET | Refills: 1 | Status: SHIPPED | OUTPATIENT
Start: 2022-02-03 | End: 2024-02-20

## 2022-02-03 RX ORDER — MELOXICAM 15 MG/1
15 TABLET ORAL DAILY
Qty: 60 TABLET | Refills: 1 | Status: SHIPPED | OUTPATIENT
Start: 2022-02-03 | End: 2024-02-20

## 2022-02-03 NOTE — PROGRESS NOTES
DATE: 2/3/2022  PATIENT: Hardik Jimenez    Attending Physician: Ry Buitrago M.D.    CHIEF COMPLAINT: LBP    HISTORY:  Hardik Jimenez is a 46 y.o. male here for initial evaluation of low back pain (Back - 6). The pain has been present for 1 week without trauma. The patient describes the pain as dull but it does not radiate down his legs.  The pain is worse with sitting for a long time and getting up in AM and improved by standing and stretching. There is no associated numbness and tingling. There is no subjective weakness. Prior treatments have included medications (norco and gabapentin), but no PT, injections or surgery.    The Patient denies myelopathic symptoms such as handwriting changes or difficulty with buttons/coins/keys. Denies perineal paresthesias, bowel/bladder dysfunction.    The patient does not smoke, have DM or endorse IVDU. The patient is not on any blood thinners and does not take chronic narcotics. He works as a  for [employer].    PAST MEDICAL/SURGICAL HISTORY:  Past Medical History:   Diagnosis Date    Arthritis     Asthma     Renal disorder     Kidney stones    Sciatica      Past Surgical History:   Procedure Laterality Date    COLONOSCOPY N/A 3/27/2018    Procedure: COLONOSCOPY;  Surgeon: Sara Rios MD;  Location: [facility];  Service: Endoscopy;  Laterality: N/A;       Current Medications:   Current Outpatient Medications:     gabapentin (NEURONTIN) 100 MG capsule, 1@ HS X 3 days then one 3X/day X 3 days then two 3X/day X3 days then 3caps 3X/day after.Stay@ most comfortable dose, Disp: 270 capsule, Rfl: 2    sars-cov-2, covid-19, (PFIZER COVID-19) 30 mcg/0.3 ml injection, , Disp: , Rfl:     cyclobenzaprine (FLEXERIL) 10 MG tablet, Take 1 tablet (10 mg total) by mouth 3 (three) times daily as needed for Muscle spasms., Disp: 60 tablet, Rfl: 1    meloxicam (MOBIC) 15 MG tablet, Take 1 tablet (15 mg total) by mouth once daily., Disp: 60 tablet, Rfl: 1    Social  "History:   Social History     Socioeconomic History    Marital status:    Tobacco Use    Smoking status: Never Smoker    Smokeless tobacco: Never Used   Substance and Sexual Activity    Alcohol use: Yes     Comment: occasionally.    Drug use: Yes     Types: Marijuana    Sexual activity: Yes     Partners: Female       REVIEW OF SYSTEMS:  Constitution: Negative. Negative for chills, fever and night sweats.   Cardiovascular: Negative for chest pain and syncope.   Respiratory: Negative for cough and shortness of breath.   Gastrointestinal: See HPI. Negative for nausea/vomiting. Negative for abdominal pain.  Genitourinary: See HPI. Negative for discoloration or dysuria.  Skin: Negative for dry skin, itching and rash.   Hematologic/Lymphatic: negative for bleeding/clotting disorders.   Musculoskeletal: Negative for falls and muscle weakness.   Neurological: See HPI. no history of seizures. no history of cranial surgery or shunts.  Endocrine: Negative for polydipsia, polyphagia and polyuria.   Allergic/Immunologic: Negative for hives and persistent infections.    PHYSICAL EXAMINATION:    Ht 6' 2" (1.88 m)   Wt 96.5 kg (212 lb 11.9 oz)   BMI 27.31 kg/m²     General: The patient is a 46 y.o. male in no apparent distress, the patient is orientatied to person, place and time.   Psych: Normal mood and affect  HEENT: Vision grossly intact, hearing intact to the spoken word.  Lungs: Respirations unlabored.  Gait: Normal station and gait, no difficulty with toe or heel walk.   Skin: Dorsal lumbar skin negative for rashes, lesions, hairy patches and surgical scars.  Range of motion: Lumbar range of motion is acceptable. There is R lower lumbar tenderness to palpation.  Spinal Balance: Global saggital and coronal spinal balance acceptable, no significant for scoliosis and kyphosis.  Musculoskeletal: No pain with the range of motion of the bilateral hips. No trochanteric tenderness to palpation.  Vascular: Bilateral " lower extremities warm and well perfused, Dorsalis pedis pulses 2+ bilaterally.  Neurological: Normal strength and tone in all major motor groups in the bilateral lower extremities. Normal sensation to light touch in the L2-S1 dermatomes bilaterally.  Deep tendon reflexes symmetric 2+ in the bilateral lower extremities.  Negative Babinski bilaterally.    IMAGING:   Today I personally reviewed AP, Lat and Flex/Ex upright L-spine films that demonstrate lumbar spondylosis without fractures; mild L5-S1 retrolisthesis.     Body mass index is 27.31 kg/m².  No results found for: HGBA1C      ASSESSMENT/PLAN:    Hardik was seen today for establish care and pain.    Diagnoses and all orders for this visit:    DDD (degenerative disc disease), lumbar    Lumbar spondylosis  -     meloxicam (MOBIC) 15 MG tablet; Take 1 tablet (15 mg total) by mouth once daily.  -     cyclobenzaprine (FLEXERIL) 10 MG tablet; Take 1 tablet (10 mg total) by mouth 3 (three) times daily as needed for Muscle spasms.  -     Ambulatory referral/consult to Physical/Occupational Therapy; Future      Follow up if symptoms worsen or fail to improve.    Patient has lumbar spondylosis. I discussed the natural history of their diagnoses as well as surgical and nonsurgical treatment options. I educated the patient on the importance of core/back strengthening, correct posture, bending/lifting ergonomics, and low-impact aerobic exercises (walking, elliptical, and aquatherapy). I will refer the patient to PT for core/back strengthening. I prescribed mobic and flexeril. Patient will follow up PRN.    Ry Buitrago MD  Orthopaedic Spine Surgeon  Department of Orthopaedic Surgery  [phone]

## 2022-05-04 ENCOUNTER — HOSPITAL ENCOUNTER (EMERGENCY)
Facility: OTHER | Age: 47
Discharge: HOME | End: 2022-05-05
Attending: EMERGENCY MEDICINE
Payer: COMMERCIAL

## 2022-05-04 ENCOUNTER — TELEPHONE (OUTPATIENT)
Dept: INTERNAL MEDICINE | Facility: CLINIC | Age: 47
End: 2022-05-04
Payer: COMMERCIAL

## 2022-05-04 DIAGNOSIS — R06.00 DYSPNEA: ICD-10-CM

## 2022-05-04 DIAGNOSIS — R07.9 CHEST PAIN: ICD-10-CM

## 2022-05-04 DIAGNOSIS — R07.89 ATYPICAL CHEST PAIN: Primary | ICD-10-CM

## 2022-05-04 LAB
ALBUMIN SERPL BCP-MCNC: 3.6 G/DL (ref 3.5–5.2)
ALP SERPL-CCNC: 70 U/L (ref 55–135)
ALT SERPL W/O P-5'-P-CCNC: 21 U/L (ref 10–44)
ANION GAP SERPL CALC-SCNC: 9 MMOL/L (ref 8–16)
AST SERPL-CCNC: 23 U/L (ref 10–40)
BASOPHILS # BLD AUTO: 0.03 K/UL (ref 0–0.2)
BASOPHILS NFR BLD: 0.5 % (ref 0–1.9)
BILIRUB SERPL-MCNC: 0.3 MG/DL (ref 0.1–1)
BUN SERPL-MCNC: 15 MG/DL (ref 6–20)
CALCIUM SERPL-MCNC: 8.9 MG/DL (ref 8.7–10.5)
CHLORIDE SERPL-SCNC: 106 MMOL/L (ref 95–110)
CO2 SERPL-SCNC: 25 MMOL/L (ref 23–29)
CREAT SERPL-MCNC: 1.5 MG/DL (ref 0.5–1.4)
D DIMER PPP IA.FEU-MCNC: 0.23 MG/L FEU
DIFFERENTIAL METHOD: ABNORMAL
EOSINOPHIL # BLD AUTO: 0.1 K/UL (ref 0–0.5)
EOSINOPHIL NFR BLD: 2.4 % (ref 0–8)
ERYTHROCYTE [DISTWIDTH] IN BLOOD BY AUTOMATED COUNT: 13.1 % (ref 11.5–14.5)
EST. GFR  (AFRICAN AMERICAN): >60 ML/MIN/1.73 M^2
EST. GFR  (NON AFRICAN AMERICAN): 55 ML/MIN/1.73 M^2
GLUCOSE SERPL-MCNC: 127 MG/DL (ref 70–110)
HCT VFR BLD AUTO: 35.5 % (ref 40–54)
HCV AB SERPL QL IA: NEGATIVE
HGB BLD-MCNC: 12.8 G/DL (ref 14–18)
HIV 1+2 AB+HIV1 P24 AG SERPL QL IA: NEGATIVE
IMM GRANULOCYTES # BLD AUTO: 0.04 K/UL (ref 0–0.04)
IMM GRANULOCYTES NFR BLD AUTO: 0.7 % (ref 0–0.5)
LIPASE SERPL-CCNC: 24 U/L (ref 4–60)
LYMPHOCYTES # BLD AUTO: 2.4 K/UL (ref 1–4.8)
LYMPHOCYTES NFR BLD: 40.9 % (ref 18–48)
MCH RBC QN AUTO: 30.3 PG (ref 27–31)
MCHC RBC AUTO-ENTMCNC: 36.1 G/DL (ref 32–36)
MCV RBC AUTO: 84 FL (ref 82–98)
MONOCYTES # BLD AUTO: 0.7 K/UL (ref 0.3–1)
MONOCYTES NFR BLD: 11.8 % (ref 4–15)
NEUTROPHILS # BLD AUTO: 2.5 K/UL (ref 1.8–7.7)
NEUTROPHILS NFR BLD: 43.7 % (ref 38–73)
NRBC BLD-RTO: 0 /100 WBC
PLATELET # BLD AUTO: 235 K/UL (ref 150–450)
PMV BLD AUTO: 9.3 FL (ref 9.2–12.9)
POTASSIUM SERPL-SCNC: 4.2 MMOL/L (ref 3.5–5.1)
PROT SERPL-MCNC: 6.7 G/DL (ref 6–8.4)
RBC # BLD AUTO: 4.23 M/UL (ref 4.6–6.2)
SODIUM SERPL-SCNC: 140 MMOL/L (ref 136–145)
TROPONIN I SERPL DL<=0.01 NG/ML-MCNC: <0.006 NG/ML (ref 0–0.03)
WBC # BLD AUTO: 5.77 K/UL (ref 3.9–12.7)

## 2022-05-04 PROCEDURE — 93005 ELECTROCARDIOGRAM TRACING: CPT

## 2022-05-04 PROCEDURE — 86803 HEPATITIS C AB TEST: CPT | Performed by: EMERGENCY MEDICINE

## 2022-05-04 PROCEDURE — 99285 EMERGENCY DEPT VISIT HI MDM: CPT | Mod: 25

## 2022-05-04 PROCEDURE — 93010 ELECTROCARDIOGRAM REPORT: CPT | Mod: ,,, | Performed by: INTERNAL MEDICINE

## 2022-05-04 PROCEDURE — 80053 COMPREHEN METABOLIC PANEL: CPT | Performed by: EMERGENCY MEDICINE

## 2022-05-04 PROCEDURE — 63600175 PHARM REV CODE 636 W HCPCS: Performed by: EMERGENCY MEDICINE

## 2022-05-04 PROCEDURE — 84484 ASSAY OF TROPONIN QUANT: CPT | Performed by: EMERGENCY MEDICINE

## 2022-05-04 PROCEDURE — 87389 HIV-1 AG W/HIV-1&-2 AB AG IA: CPT | Performed by: EMERGENCY MEDICINE

## 2022-05-04 PROCEDURE — 96374 THER/PROPH/DIAG INJ IV PUSH: CPT

## 2022-05-04 PROCEDURE — 85379 FIBRIN DEGRADATION QUANT: CPT | Performed by: EMERGENCY MEDICINE

## 2022-05-04 PROCEDURE — 85025 COMPLETE CBC W/AUTO DIFF WBC: CPT | Performed by: EMERGENCY MEDICINE

## 2022-05-04 PROCEDURE — 93010 EKG 12-LEAD: ICD-10-PCS | Mod: ,,, | Performed by: INTERNAL MEDICINE

## 2022-05-04 PROCEDURE — 83690 ASSAY OF LIPASE: CPT | Performed by: EMERGENCY MEDICINE

## 2022-05-04 RX ORDER — KETOROLAC TROMETHAMINE 30 MG/ML
10 INJECTION, SOLUTION INTRAMUSCULAR; INTRAVENOUS
Status: COMPLETED | OUTPATIENT
Start: 2022-05-04 | End: 2022-05-04

## 2022-05-04 RX ADMIN — KETOROLAC TROMETHAMINE 10 MG: 30 INJECTION, SOLUTION INTRAMUSCULAR at 10:05

## 2022-05-04 NOTE — TELEPHONE ENCOUNTER
----- Message from Francheska Stoddard DNP sent at 2022  3:31 PM CDT -----  Regardin appt tomorrow  Pt scheduled for chest pain tomorrow at 930. This needs to be triaged. Please triage prior to tomorrow to see if appropriate and message me when completed.    Francheska Stoddard DNP, FNP-C

## 2022-05-04 NOTE — TELEPHONE ENCOUNTER
Pt states that his pain in the chest started last night. The pain in the center of the chest slightly to the right radiates to his shoulder and back area. He continues to have a dull sensation of discomfort. He states that he is slightly short of breath and increase fatigue.  He denies nausea, swelling of extremities. LOC, or cough. He has not recently lifted or pulled on anything. He also denies having HTN, DM or any cardiac issues.

## 2022-05-05 VITALS
WEIGHT: 210 LBS | RESPIRATION RATE: 19 BRPM | SYSTOLIC BLOOD PRESSURE: 117 MMHG | HEART RATE: 67 BPM | HEIGHT: 73 IN | TEMPERATURE: 98 F | OXYGEN SATURATION: 100 % | BODY MASS INDEX: 27.83 KG/M2 | DIASTOLIC BLOOD PRESSURE: 76 MMHG

## 2022-05-05 NOTE — ED TRIAGE NOTES
Pt presents to the ED c/o chest pain. Pt reports intermittent right sided chest pain with radiation to the left side and back starting yesterday. Reports that repositioning helps with the pain. The pain is a dull tightness in the chest. Denies SOB, fever, weakness, headache, body aches, chills, or n/v/d. AAOx4

## 2022-05-05 NOTE — ED PROVIDER NOTES
Encounter Date: 5/4/2022    SCRIBE #1 NOTE: I, Papito Jiang, am scribing for, and in the presence of,  Ayesha Michele MD. I have scribed the following portions of the note - Other sections scribed: HPI, ROS, PE.       History     Chief Complaint   Patient presents with    Chest Pain     Onset yesterday, reports right chest pain radiating into left shoulder and back/chest region - intermittent, dull and tightness. Was sitting onset during, repositioning movement makes it better. Denies any SOB/HA/nausea, mild lightheadedness.      Time seen by provider: 10:15 PM    This is a 46 y.o. male who presents with complaint of mid-sternal chest pains starting last night. Patient states he was driving when this began, and was improved with positioning. The initial episode persisted for 30 minutes with ensuing soreness, which has returned intermittently with subsequent episodes lasting up to two hours. The pain radiates into his left side but not his shoulder or back. Each episode has been accompanied by generalized weakness. He recall this as familiar to several episodes which occurred while at work one month ago, and this pattern prompted him to reach out to his primary care. His PCP referred him here, and he has a follow-up appointment scheduled for tomorrow morning. He denies any shortness of breath, abdominal pain, dizziness, or lightheadedness. PMHx of well-controlled asthma without any exacerbation in years. No PMHx of CVA, DM, HTN, or cardiac arrest. No FHx of cardiac arrest before 60 years of age. SHx of marijuana use but no alcohol use.    The history is provided by the patient.     Review of patient's allergies indicates:  No Known Allergies  Past Medical History:   Diagnosis Date    Arthritis     Asthma     Renal disorder     Kidney stones    Sciatica      Past Surgical History:   Procedure Laterality Date    COLONOSCOPY N/A 3/27/2018    Procedure: COLONOSCOPY;  Surgeon: Sara Rios MD;  Location: [facility]  ENDO;  Service: Endoscopy;  Laterality: N/A;     Family History   Problem Relation Age of Onset    No Known Problems Mother     No Known Problems Father      Social History     Tobacco Use    Smoking status: Never Smoker    Smokeless tobacco: Never Used   Substance Use Topics    Alcohol use: Yes     Comment: occasionally.    Drug use: Yes     Types: Marijuana     Review of Systems   Respiratory: Negative for shortness of breath.    Cardiovascular: Positive for chest pain.   Gastrointestinal: Negative for abdominal pain.   Neurological: Positive for weakness. Negative for dizziness and light-headedness.       Physical Exam     Initial Vitals [05/04/22 2200]   BP Pulse Resp Temp SpO2   108/72 91 18 98.2 °F (36.8 °C) 99 %      MAP       --         Physical Exam    Nursing note and vitals reviewed.  Constitutional: He appears well-developed and well-nourished. He does not have a sickly appearance. No distress.   HENT:   Head: Normocephalic and atraumatic.   Right Ear: External ear normal.   Left Ear: External ear normal.   Eyes: Conjunctivae, EOM and lids are normal. Right eye exhibits no discharge. Left eye exhibits no discharge. Right conjunctiva is not injected. Right conjunctiva has no hemorrhage. Left conjunctiva is not injected. Left conjunctiva has no hemorrhage. No scleral icterus.   Neck: Phonation normal. No stridor present. No tracheal deviation present.   Normal range of motion.  Cardiovascular: Normal rate, regular rhythm, normal heart sounds and intact distal pulses. Exam reveals no friction rub.    No murmur heard.  Pulses:       Radial pulses are 2+ on the right side and 2+ on the left side.        Dorsalis pedis pulses are 2+ on the right side and 2+ on the left side.   Pulmonary/Chest: Breath sounds normal. No respiratory distress. He has no wheezes. He has no rales. He exhibits no tenderness.   Abdominal: Abdomen is soft. He exhibits no distension. There is no abdominal tenderness. There is no  rebound and no guarding.   Musculoskeletal:      Cervical back: Normal range of motion.      Comments: No lower extremity edema.     Neurological: He is alert and oriented to person, place, and time. He has normal strength. GCS eye subscore is 4. GCS verbal subscore is 5. GCS motor subscore is 6.   Skin: Skin is warm.   Psychiatric: He has a normal mood and affect. His speech is normal and behavior is normal. Judgment and thought content normal. Cognition and memory are normal.         ED Course   Procedures  Labs Reviewed   CBC W/ AUTO DIFFERENTIAL - Abnormal; Notable for the following components:       Result Value    RBC 4.23 (*)     Hemoglobin 12.8 (*)     Hematocrit 35.5 (*)     MCHC 36.1 (*)     Immature Granulocytes 0.7 (*)     All other components within normal limits    Narrative:     Release to patient->Immediate   COMPREHENSIVE METABOLIC PANEL - Abnormal; Notable for the following components:    Glucose 127 (*)     Creatinine 1.5 (*)     eGFR if non  55 (*)     All other components within normal limits    Narrative:     Release to patient->Immediate   HIV 1 / 2 ANTIBODY    Narrative:     Release to patient->Immediate   HEPATITIS C ANTIBODY    Narrative:     Release to patient->Immediate   LIPASE    Narrative:     Release to patient->Immediate   D DIMER, QUANTITATIVE   TROPONIN I     EKG Readings: (Independently Interpreted)   Normal sinus rhythm. Rate of 81. Normal intervals. No ST or T wave changes.       Imaging Results          X-Ray Chest AP Portable (Final result)  Result time 05/04/22 22:35:02    Final result by Cecy Car MD (05/04/22 22:35:02)                 Impression:      No acute cardiopulmonary process identified.      Electronically signed by: Cecy Car MD  Date:    05/04/2022  Time:    22:35             Narrative:    EXAMINATION:  XR CHEST AP PORTABLE    CLINICAL HISTORY:  Dyspnea, unspecified    TECHNIQUE:  Single frontal view of the chest was  performed.    COMPARISON:  August 2016.    FINDINGS:  Cardiac silhouette is normal in size.  Lungs are symmetrically expanded.  No evidence of focal consolidative process, pneumothorax, or significant pleural effusion.  No acute osseous abnormality identified.                                 Medications   ketorolac injection 9.999 mg (9.999 mg Intravenous Given 5/4/22 2246)     Medical Decision Making:   History:   Old Medical Records: I decided to obtain old medical records.  Independently Interpreted Test(s):   I have ordered and independently interpreted EKG Reading(s) - see prior notes  Clinical Tests:   Lab Tests: Ordered and Reviewed  Radiological Study: Ordered and Reviewed  Medical Tests: Ordered and Reviewed    Additional MDM:   Comments:   46-year-old male with history of asthma well controlled presents complaining of intermittent right-sided chest pain.  Most recent episode started yesterday.  Patient low risk for ACS.   Will obtain labs, EKG, chest x-ray and give Toradol for pain..        Scribe Attestation:   Scribe #1: I performed the above scribed service and the documentation accurately describes the services I performed. I attest to the accuracy of the note.        ED Course as of 05/05/22 0023   Thu May 05, 2022   0020  On reassessment the patient is resting comfortably.  Denies any further pain.  Workup tonight is in significant for slight elevation in his creatinine from 1.2-1.5.  Remainder of his lab work showed no significant abnormalities.  D-dimer is normal.  Troponin is normal.  Chest x-ray shows no evidence of acute process and EKG showed no acute ischemic changes or dysrhythmia.  Etiology of the patient's chest pain is unclear at this time.  The patient was encouraged to keep his appointment with his primary care tomorrow morning.  He will be discharged home at this time stable condition. [AA]      ED Course User Index  [AA] Ayesha Michele MD           Physician Attestation for Scribe: I,  Ayesha Michele  , reviewed documentation as scribed in my presence, which is both accurate and complete.    Clinical Impression:   Final diagnoses:  [R07.9] Chest pain  [R06.00] Dyspnea                 Ayesha Michele MD  05/05/22 0023

## 2022-10-20 ENCOUNTER — OFFICE VISIT (OUTPATIENT)
Dept: PODIATRY | Facility: CLINIC | Age: 47
End: 2022-10-20
Payer: COMMERCIAL

## 2022-10-20 VITALS — DIASTOLIC BLOOD PRESSURE: 70 MMHG | HEART RATE: 61 BPM | SYSTOLIC BLOOD PRESSURE: 112 MMHG

## 2022-10-20 DIAGNOSIS — M79.671 FOOT PAIN, BILATERAL: ICD-10-CM

## 2022-10-20 DIAGNOSIS — M21.42 FLAT FEET, BILATERAL: Primary | ICD-10-CM

## 2022-10-20 DIAGNOSIS — M21.41 FLAT FEET, BILATERAL: Primary | ICD-10-CM

## 2022-10-20 DIAGNOSIS — M79.672 FOOT PAIN, BILATERAL: ICD-10-CM

## 2022-10-20 PROCEDURE — 99203 OFFICE O/P NEW LOW 30 MIN: CPT | Mod: S$GLB,,, | Performed by: PODIATRIST

## 2022-10-20 PROCEDURE — 3074F SYST BP LT 130 MM HG: CPT | Mod: CPTII,S$GLB,, | Performed by: PODIATRIST

## 2022-10-20 PROCEDURE — 3074F PR MOST RECENT SYSTOLIC BLOOD PRESSURE < 130 MM HG: ICD-10-PCS | Mod: CPTII,S$GLB,, | Performed by: PODIATRIST

## 2022-10-20 PROCEDURE — 1159F PR MEDICATION LIST DOCUMENTED IN MEDICAL RECORD: ICD-10-PCS | Mod: CPTII,S$GLB,, | Performed by: PODIATRIST

## 2022-10-20 PROCEDURE — 99999 PR PBB SHADOW E&M-EST. PATIENT-LVL III: CPT | Mod: PBBFAC,,, | Performed by: PODIATRIST

## 2022-10-20 PROCEDURE — 3078F PR MOST RECENT DIASTOLIC BLOOD PRESSURE < 80 MM HG: ICD-10-PCS | Mod: CPTII,S$GLB,, | Performed by: PODIATRIST

## 2022-10-20 PROCEDURE — 99203 PR OFFICE/OUTPT VISIT, NEW, LEVL III, 30-44 MIN: ICD-10-PCS | Mod: S$GLB,,, | Performed by: PODIATRIST

## 2022-10-20 PROCEDURE — 99999 PR PBB SHADOW E&M-EST. PATIENT-LVL III: ICD-10-PCS | Mod: PBBFAC,,, | Performed by: PODIATRIST

## 2022-10-20 PROCEDURE — 1159F MED LIST DOCD IN RCRD: CPT | Mod: CPTII,S$GLB,, | Performed by: PODIATRIST

## 2022-10-20 PROCEDURE — 3078F DIAST BP <80 MM HG: CPT | Mod: CPTII,S$GLB,, | Performed by: PODIATRIST

## 2022-10-20 NOTE — PROGRESS NOTES
Subjective:      Patient ID: Hardik Jimenez is a 47 y.o. male.    Chief Complaint: Foot Pain (Bilateral foot pain )    Hardik is a 47 y.o. male who presents to the podiatry clinic  with complaint of  bilateral foot pain. Onset of the symptoms was several months ago. Precipitating event: none known. Current symptoms include: ability to bear weight, but with some pain. Aggravating factors: any weight bearing. Symptoms have gradually worsened. Patient has had no prior foot problems. Evaluation to date: none. Treatment to date: none. Patients rates pain 5/10 on pain scale.    Review of Systems   Constitutional: Negative for chills, fever and malaise/fatigue.   HENT:  Negative for hearing loss.    Cardiovascular:  Negative for claudication.   Respiratory:  Negative for shortness of breath.    Skin:  Negative for flushing and rash.   Musculoskeletal:  Negative for joint pain and myalgias.   Neurological:  Negative for loss of balance, numbness, paresthesias and sensory change.   Psychiatric/Behavioral:  Negative for altered mental status.          Objective:      Physical Exam  Vitals reviewed.   Cardiovascular:      Pulses:           Dorsalis pedis pulses are 2+ on the right side and 2+ on the left side.        Posterior tibial pulses are 2+ on the right side and 2+ on the left side.      Comments: No edema noted b/L  Musculoskeletal:      Comments: Decreased height of medial arches noted with loading of the foot b/L         Feet:      Right foot:      Protective Sensation: 5 sites tested.  5 sites sensed.      Left foot:      Protective Sensation: 5 sites tested.  5 sites sensed.   Skin:     Comments: Normal skin tugor noted.   No open lesion noted b/L  Skin temp is warm to warm from proximal to distal b/L.  Webspaces clean, dry, and intact     Neurological:      Mental Status: He is alert.      Comments: Gross sensation intact b/L           Assessment:       Encounter Diagnoses   Name Primary?    Flat feet, bilateral  Yes    Foot pain, bilateral          Plan:       Hardik was seen today for foot pain.    Diagnoses and all orders for this visit:    Flat feet, bilateral  -     ORTHOTIC DEVICE (DME)    Foot pain, bilateral  -     ORTHOTIC DEVICE (DME)      I counseled the patient on his conditions, their implications and medical management.  Pt advised that the pain in his feet is likely caused by flat foot type.   Rx custom orthotics   Shoe modification advised for the pt. Pt was advised to obtain shoes will accommodate foot deformities.   Call or return to clinic prn if these symptoms worsen or fail to improve as anticipated.        .

## 2023-07-05 ENCOUNTER — HOSPITAL ENCOUNTER (EMERGENCY)
Facility: OTHER | Age: 48
Discharge: HOME | End: 2023-07-05
Attending: EMERGENCY MEDICINE
Payer: COMMERCIAL

## 2023-07-05 VITALS
TEMPERATURE: 99 F | SYSTOLIC BLOOD PRESSURE: 119 MMHG | HEART RATE: 59 BPM | DIASTOLIC BLOOD PRESSURE: 78 MMHG | WEIGHT: 214 LBS | HEIGHT: 74 IN | OXYGEN SATURATION: 99 % | BODY MASS INDEX: 27.46 KG/M2 | RESPIRATION RATE: 16 BRPM

## 2023-07-05 DIAGNOSIS — U07.1 COVID-19: Primary | ICD-10-CM

## 2023-07-05 DIAGNOSIS — R07.9 CHEST PAIN: ICD-10-CM

## 2023-07-05 DIAGNOSIS — R52 BODY ACHES: ICD-10-CM

## 2023-07-05 LAB
ALBUMIN SERPL BCP-MCNC: 4.4 G/DL (ref 3.5–5.2)
ALP SERPL-CCNC: 79 U/L (ref 55–135)
ALT SERPL W/O P-5'-P-CCNC: 22 U/L (ref 10–44)
ANION GAP SERPL CALC-SCNC: 9 MMOL/L (ref 8–16)
AST SERPL-CCNC: 25 U/L (ref 10–40)
BASOPHILS # BLD AUTO: 0.02 K/UL (ref 0–0.2)
BASOPHILS NFR BLD: 0.4 % (ref 0–1.9)
BILIRUB SERPL-MCNC: 0.5 MG/DL (ref 0.1–1)
BUN SERPL-MCNC: 11 MG/DL (ref 6–20)
CALCIUM SERPL-MCNC: 9.7 MG/DL (ref 8.7–10.5)
CHLORIDE SERPL-SCNC: 104 MMOL/L (ref 95–110)
CO2 SERPL-SCNC: 23 MMOL/L (ref 23–29)
CREAT SERPL-MCNC: 1.5 MG/DL (ref 0.5–1.4)
CTP QC/QA: YES
CTP QC/QA: YES
DIFFERENTIAL METHOD: ABNORMAL
EOSINOPHIL # BLD AUTO: 0 K/UL (ref 0–0.5)
EOSINOPHIL NFR BLD: 0.2 % (ref 0–8)
ERYTHROCYTE [DISTWIDTH] IN BLOOD BY AUTOMATED COUNT: 13.3 % (ref 11.5–14.5)
EST. GFR  (NO RACE VARIABLE): 57 ML/MIN/1.73 M^2
GLUCOSE SERPL-MCNC: 94 MG/DL (ref 70–110)
HCT VFR BLD AUTO: 42.2 % (ref 40–54)
HCV AB SERPL QL IA: NEGATIVE
HGB BLD-MCNC: 15 G/DL (ref 14–18)
HIV 1+2 AB+HIV1 P24 AG SERPL QL IA: NEGATIVE
IMM GRANULOCYTES # BLD AUTO: 0.02 K/UL (ref 0–0.04)
IMM GRANULOCYTES NFR BLD AUTO: 0.4 % (ref 0–0.5)
LYMPHOCYTES # BLD AUTO: 0.8 K/UL (ref 1–4.8)
LYMPHOCYTES NFR BLD: 16.1 % (ref 18–48)
MCH RBC QN AUTO: 29.4 PG (ref 27–31)
MCHC RBC AUTO-ENTMCNC: 35.5 G/DL (ref 32–36)
MCV RBC AUTO: 83 FL (ref 82–98)
MONOCYTES # BLD AUTO: 1.7 K/UL (ref 0.3–1)
MONOCYTES NFR BLD: 34.6 % (ref 4–15)
NEUTROPHILS # BLD AUTO: 2.4 K/UL (ref 1.8–7.7)
NEUTROPHILS NFR BLD: 48.3 % (ref 38–73)
NRBC BLD-RTO: 0 /100 WBC
PLATELET # BLD AUTO: 212 K/UL (ref 150–450)
PMV BLD AUTO: 8.9 FL (ref 9.2–12.9)
POC MOLECULAR INFLUENZA A AGN: NEGATIVE
POC MOLECULAR INFLUENZA B AGN: NEGATIVE
POTASSIUM SERPL-SCNC: 4.4 MMOL/L (ref 3.5–5.1)
PROT SERPL-MCNC: 8.3 G/DL (ref 6–8.4)
RBC # BLD AUTO: 5.1 M/UL (ref 4.6–6.2)
SARS-COV-2 RDRP RESP QL NAA+PROBE: POSITIVE
SODIUM SERPL-SCNC: 136 MMOL/L (ref 136–145)
TROPONIN I SERPL DL<=0.01 NG/ML-MCNC: <0.006 NG/ML (ref 0–0.03)
WBC # BLD AUTO: 5.03 K/UL (ref 3.9–12.7)

## 2023-07-05 PROCEDURE — 25000003 PHARM REV CODE 250: Performed by: EMERGENCY MEDICINE

## 2023-07-05 PROCEDURE — 85025 COMPLETE CBC W/AUTO DIFF WBC: CPT | Performed by: PHYSICIAN ASSISTANT

## 2023-07-05 PROCEDURE — 87635 SARS-COV-2 COVID-19 AMP PRB: CPT | Performed by: PHYSICIAN ASSISTANT

## 2023-07-05 PROCEDURE — 93010 ELECTROCARDIOGRAM REPORT: CPT | Mod: ,,, | Performed by: INTERNAL MEDICINE

## 2023-07-05 PROCEDURE — 96372 THER/PROPH/DIAG INJ SC/IM: CPT | Performed by: EMERGENCY MEDICINE

## 2023-07-05 PROCEDURE — 93010 EKG 12-LEAD: ICD-10-PCS | Mod: ,,, | Performed by: INTERNAL MEDICINE

## 2023-07-05 PROCEDURE — 93005 ELECTROCARDIOGRAM TRACING: CPT

## 2023-07-05 PROCEDURE — 86803 HEPATITIS C AB TEST: CPT | Performed by: EMERGENCY MEDICINE

## 2023-07-05 PROCEDURE — 63600175 PHARM REV CODE 636 W HCPCS: Performed by: EMERGENCY MEDICINE

## 2023-07-05 PROCEDURE — 84484 ASSAY OF TROPONIN QUANT: CPT | Performed by: PHYSICIAN ASSISTANT

## 2023-07-05 PROCEDURE — 99284 EMERGENCY DEPT VISIT MOD MDM: CPT

## 2023-07-05 PROCEDURE — 87389 HIV-1 AG W/HIV-1&-2 AB AG IA: CPT | Performed by: EMERGENCY MEDICINE

## 2023-07-05 PROCEDURE — 80053 COMPREHEN METABOLIC PANEL: CPT | Performed by: PHYSICIAN ASSISTANT

## 2023-07-05 RX ORDER — ONDANSETRON 4 MG/1
4 TABLET, ORALLY DISINTEGRATING ORAL
Status: COMPLETED | OUTPATIENT
Start: 2023-07-05 | End: 2023-07-05

## 2023-07-05 RX ORDER — NIRMATRELVIR AND RITONAVIR 300-100 MG
KIT ORAL
Qty: 30 TABLET | Refills: 0 | Status: SHIPPED | OUTPATIENT
Start: 2023-07-05 | End: 2023-07-10

## 2023-07-05 RX ORDER — ALBUTEROL SULFATE 90 UG/1
1-2 AEROSOL, METERED RESPIRATORY (INHALATION) EVERY 6 HOURS PRN
Qty: 8 G | Refills: 0 | Status: SHIPPED | OUTPATIENT
Start: 2023-07-05 | End: 2023-10-26 | Stop reason: SDUPTHER

## 2023-07-05 RX ORDER — ONDANSETRON 2 MG/ML
4 INJECTION INTRAMUSCULAR; INTRAVENOUS
Status: DISCONTINUED | OUTPATIENT
Start: 2023-07-05 | End: 2023-07-05

## 2023-07-05 RX ORDER — ONDANSETRON 4 MG/1
4 TABLET, ORALLY DISINTEGRATING ORAL EVERY 6 HOURS PRN
Qty: 15 TABLET | Refills: 0 | Status: SHIPPED | OUTPATIENT
Start: 2023-07-05 | End: 2024-02-07 | Stop reason: SDUPTHER

## 2023-07-05 RX ORDER — BUTALBITAL, ACETAMINOPHEN AND CAFFEINE 50; 325; 40 MG/1; MG/1; MG/1
1 TABLET ORAL EVERY 4 HOURS PRN
Qty: 20 TABLET | Refills: 0 | Status: SHIPPED | OUTPATIENT
Start: 2023-07-05 | End: 2023-08-04

## 2023-07-05 RX ORDER — IBUPROFEN 600 MG/1
600 TABLET ORAL EVERY 6 HOURS PRN
Qty: 20 TABLET | Refills: 0 | Status: SHIPPED | OUTPATIENT
Start: 2023-07-05 | End: 2024-02-20

## 2023-07-05 RX ORDER — ACETAMINOPHEN 500 MG
1000 TABLET ORAL
Status: COMPLETED | OUTPATIENT
Start: 2023-07-05 | End: 2023-07-05

## 2023-07-05 RX ORDER — KETOROLAC TROMETHAMINE 30 MG/ML
10 INJECTION, SOLUTION INTRAMUSCULAR; INTRAVENOUS
Status: COMPLETED | OUTPATIENT
Start: 2023-07-05 | End: 2023-07-05

## 2023-07-05 RX ADMIN — KETOROLAC TROMETHAMINE 10 MG: 30 INJECTION, SOLUTION INTRAMUSCULAR; INTRAVENOUS at 10:07

## 2023-07-05 RX ADMIN — ONDANSETRON 4 MG: 4 TABLET, ORALLY DISINTEGRATING ORAL at 09:07

## 2023-07-05 RX ADMIN — ACETAMINOPHEN 1000 MG: 500 TABLET ORAL at 10:07

## 2023-07-06 NOTE — ED TRIAGE NOTES
Patient States he has had pain in chest and all over body since yesterday , pain on scale 10 with associated nausea

## 2023-07-06 NOTE — ED PROVIDER NOTES
"Encounter Date: 7/5/2023       History     Chief Complaint   Patient presents with    Chest Pain     C/o chest pain accompanied by palpitations, in addition to R flank pain "it feels like when I had a kidney stone," subjective F/C, accompanied by urinary frequency and hesitancy, denies cough, SOB PMH asthma       46 yo man that presents for evaluation of body aches cough and mild palpitations. He has had a few episodes similarly in the past but no obvious etiology known. He has had asthma but it has offered few issues in his adult life. He has not taken anything to help. He denies any sick contacts.     Review of patient's allergies indicates:  No Known Allergies  Past Medical History:   Diagnosis Date    Arthritis     Asthma     Renal disorder     Kidney stones    Sciatica      Past Surgical History:   Procedure Laterality Date    COLONOSCOPY N/A 3/27/2018    Procedure: COLONOSCOPY;  Surgeon: Sara Rios MD;  Location: [facility];  Service: Endoscopy;  Laterality: N/A;     Family History   Problem Relation Age of Onset    No Known Problems Mother     No Known Problems Father      Social History     Tobacco Use    Smoking status: Never    Smokeless tobacco: Never   Substance Use Topics    Alcohol use: Yes     Comment: occasionally.    Drug use: Yes     Types: Marijuana     Review of Systems  Constitutional-no fever  HEENT-+congestion  Eyes-no redness  Respiratory-no shortness of breath  Cardio-no chest pain  GI-no abdominal pain  Endocrine-no cold intolerance  -no difficulty urinating  MSK-+myalgias  Skin-no rashes  Allergy-no environmental allergy  Neurologic-, no headache  Hematology-no swollen nodes  Behavioral-no confusion   Physical Exam     Initial Vitals [07/05/23 1959]   BP Pulse Resp Temp SpO2   121/71 68 18 99.5 °F (37.5 °C) 99 %      MAP       --         Physical Exam  Constitutional: mildly uncomfortable appearing man in mild distress  Eyes: Conjunctivae normal.  ENT       Head: Normocephalic, " atraumatic.       Nose: Normal external appearance        Mouth/Throat: no strigulous respirations   Hematological/Lymphatic/Immunilogical: no visible lymphadenopathy   Cardiovascular: Normal rate,   Respiratory: Normal respiratory effort.   Gastrointestinal: non distended   Musculoskeletal: Normal range of motion in all extremities. No obvious deformities or swelling.  Neurologic: Alert, oriented. Normal speech and language. No gross focal neurologic deficits are appreciated.  Skin: Skin is warm, dry. No rash noted.  Psychiatric: Mood and affect are normal.    ED Course   Procedures  Labs Reviewed   CBC W/ AUTO DIFFERENTIAL - Abnormal; Notable for the following components:       Result Value    MPV 8.9 (*)     Lymph # 0.8 (*)     Mono # 1.7 (*)     Lymph % 16.1 (*)     Mono % 34.6 (*)     All other components within normal limits   COMPREHENSIVE METABOLIC PANEL - Abnormal; Notable for the following components:    Creatinine 1.5 (*)     eGFR 57 (*)     All other components within normal limits   SARS-COV-2 RDRP GENE - Abnormal; Notable for the following components:    POC Rapid COVID Positive (*)     All other components within normal limits   HIV 1 / 2 ANTIBODY    Narrative:     Release to patient->Immediate   HEPATITIS C ANTIBODY    Narrative:     Release to patient->Immediate   TROPONIN I   POCT INFLUENZA A/B MOLECULAR        ECG Results              EKG 12-lead (Preliminary result)  Result time 07/05/23 21:38:47      Wet Read by Orville Gil MD (07/05/23 21:38:47, [facility] Emergency Dept, Emergency Medicine)    My EKG interpretation, sinus rhythm, 67 beats per minute, normal axis, no ST segment changes when compared to previous EKG 05/04/2022 no appreciable changes                                  Imaging Results    None          Medications   ketorolac injection 9.999 mg (has no administration in time range)   acetaminophen tablet 1,000 mg (has no administration in time range)   ondansetron  disintegrating tablet 4 mg (4 mg Oral Given 7/5/23 2136)     Medical Decision Making:   History:   Old Medical Records: I decided to obtain old medical records.  Old Records Summarized: records from clinic visits and records from previous admission(s).  Differential Diagnosis:   Covid, influenza, pneumonia, bronchitis, myocardial infarction, arrythmia  Independently Interpreted Test(s):   I have ordered and independently interpreted EKG Reading(s) - see prior notes  Clinical Tests:   Lab Tests: Ordered and Reviewed  Medical Tests: Ordered and Reviewed  ED Management:  Generally well appearing 46 yo man in no obvious distress.  Covid +, no obvious anomaly on testing otherwise. Plan for treatment encouraged return in case of worsening. Low suspicion for more serious intrathoracic process                        Clinical Impression:   Final diagnoses:  [R07.9] Chest pain  [U07.1] COVID-19 (Primary)  [R52] Body aches        ED Disposition Condition    Discharge Stable          ED Prescriptions       Medication Sig Dispense Start Date End Date Auth. Provider    butalbital-acetaminophen-caffeine -40 mg (FIORICET, ESGIC) -40 mg per tablet Take 1 tablet by mouth every 4 (four) hours as needed for Pain. 20 tablet 7/5/2023 8/4/2023 Orville Gil MD    ondansetron (ZOFRAN-ODT) 4 MG TbDL Take 1 tablet (4 mg total) by mouth every 6 (six) hours as needed (nausea). 15 tablet 7/5/2023 -- Orville Gil MD    ibuprofen (ADVIL,MOTRIN) 600 MG tablet Take 1 tablet (600 mg total) by mouth every 6 (six) hours as needed for Pain. 20 tablet 7/5/2023 -- Orville Gil MD    nirmatrelvir-ritonavir (PAXLOVID) 300 mg (150 mg x 2)-100 mg copackaged tablets (EUA) Take 3 tablets by mouth 2 (two) times daily. Each dose contains 2 nirmatrelvir (pink tablets) and 1 ritonavir (white tablet). Take all 3 tablets together 30 tablet 7/5/2023 7/10/2023 Orville Gil MD          Follow-up Information       Follow up  With Specialties Details Why Contact Info    Kelvin Pablo, DO Internal Medicine Call  As needed [address] [facility] [address]  [phone]               Orville Gil MD  07/07/23 8500       Orville Gil MD  07/07/23 5482

## 2023-07-06 NOTE — FIRST PROVIDER EVALUATION
" Emergency Department TeleTriage Encounter Note      CHIEF COMPLAINT    Chief Complaint   Patient presents with    Chest Pain     C/o chest pain accompanied by palpitations, in addition to R flank pain "it feels like when I had a kidney stone," subjective F/C, accompanied by urinary frequency and hesitancy, denies cough, SOB PMH asthma         VITAL SIGNS   Initial Vitals [07/05/23 1959]   BP Pulse Resp Temp SpO2   121/71 68 18 99.5 °F (37.5 °C) 99 %      MAP       --            ALLERGIES    Review of patient's allergies indicates:  No Known Allergies    PROVIDER TRIAGE NOTE  This is a teletriage evaluation of a 47 y.o. male presenting to the ED complaining of multiple symptoms - reports body aches and chills.  Reports nausea.  Reports dysuria and flank pain.  Reports chest pain and shortness of breath.     Initial orders will be placed and care will be transferred to an alternate provider when patient is roomed for a full evaluation. Any additional orders and the final disposition will be determined by that provider.         ORDERS  Labs Reviewed   HIV 1 / 2 ANTIBODY   HEPATITIS C ANTIBODY   CBC W/ AUTO DIFFERENTIAL   COMPREHENSIVE METABOLIC PANEL   TROPONIN I   POCT INFLUENZA A/B MOLECULAR   SARS-COV-2 RDRP GENE       ED Orders (720h ago, onward)      Start Ordered     Status Ordering Provider    07/05/23 2115 07/05/23 2103  ondansetron injection 4 mg  ED 1 Time         Ordered JANIS COVINGTON    07/05/23 2104 07/05/23 2103  Pulse Oximetry Continuous  Continuous         Ordered JANIS COVINGTON    07/05/23 2103 07/05/23 2103  Saline lock IV  Once         Ordered JANIS COVINGTON    07/05/23 2103 07/05/23 2103  CBC auto differential  STAT         Ordered JANIS COVINGTON    07/05/23 2103 07/05/23 2103  Comprehensive metabolic panel  STAT         Ordered JANIS COVINGTON    07/05/23 2103 07/05/23 2103  POCT Influenza A/B Molecular  Once         Ordered " JANIS COVINGTON JOHN.    07/05/23 2103 07/05/23 2103  POCT COVID-19 Rapid Screening  Once         Ordered JANIS COVINGTON JOHN.    07/05/23 2103 07/05/23 2103  X-Ray Chest PA And Lateral  1 time imaging         Ordered JANIS COVINGTON JOHN.    07/05/23 2103 07/05/23 2103  Troponin I  STAT         Ordered JANIS COVINGTON ROCHELLE    07/05/23 2103 07/05/23 2103  Cardiac Monitoring - Adult  Continuous        Comments: Notify Physician If:    Ordered JANIS COVINGTON ROCHELLE    07/05/23 2002 07/05/23 2001  HIV 1/2 Ag/Ab (4th Gen)  STAT         In process AMRIT ZAMARRIPA    07/05/23 2002 07/05/23 2001  Hepatitis C Antibody  STAT         In process AMRIT ZAMARRIPA    07/05/23 1957 07/05/23 1956  EKG 12-lead  Once         Completed by KHALIDA ZUNIGA on 7/5/2023 at  7:57 PM AMRIT ZAMARRIPA              Virtual Visit Note: The provider triage portion of this emergency department evaluation and documentation was performed via [employer], a HIPAA-compliant telemedicine application, in concert with a tele-presenter in the room. A face to face patient evaluation with one of my colleagues will occur once the patient is placed in an emergency department room.      DISCLAIMER: This note was prepared with Live[employer] voice recognition transcription software. Garbled syntax, mangled pronouns, and other bizarre constructions may be attributed to that software system.

## 2023-07-06 NOTE — DISCHARGE INSTRUCTIONS
Mr. Jimenez,    Thank you for letting me care for you today! It was nice meeting you, and I hope you feel better soon.   If you would like access to your chart and what was done today please utilize the [facility].   Please come back to [facility] for all of your future medical needs.    Our goal in the emergency department is to always give you outstanding care and exceptional service. You may receive a survey by mail or e-mail in the next week regarding your experience in our ED. We would greatly appreciate you completing and returning the survey. Your feedback provides us with a way to recognize our staff who give very good care and it helps us learn how to improve when your experience was below our aspiration of excellence.     Sincerely,    Orville Gil MD  Board Certified Emergency Physician

## 2023-10-26 ENCOUNTER — OFFICE VISIT (OUTPATIENT)
Dept: INTERNAL MEDICINE | Facility: CLINIC | Age: 48
End: 2023-10-26
Attending: FAMILY MEDICINE
Payer: COMMERCIAL

## 2023-10-26 VITALS
HEART RATE: 76 BPM | OXYGEN SATURATION: 98 % | BODY MASS INDEX: 26.91 KG/M2 | SYSTOLIC BLOOD PRESSURE: 110 MMHG | WEIGHT: 209.69 LBS | HEIGHT: 74 IN | DIASTOLIC BLOOD PRESSURE: 64 MMHG

## 2023-10-26 DIAGNOSIS — J40 BRONCHITIS: Primary | ICD-10-CM

## 2023-10-26 DIAGNOSIS — R06.02 SOB (SHORTNESS OF BREATH): ICD-10-CM

## 2023-10-26 PROCEDURE — 1160F PR REVIEW ALL MEDS BY PRESCRIBER/CLIN PHARMACIST DOCUMENTED: ICD-10-PCS | Mod: CPTII,S$GLB,, | Performed by: FAMILY MEDICINE

## 2023-10-26 PROCEDURE — 3074F PR MOST RECENT SYSTOLIC BLOOD PRESSURE < 130 MM HG: ICD-10-PCS | Mod: CPTII,S$GLB,, | Performed by: FAMILY MEDICINE

## 2023-10-26 PROCEDURE — 3078F DIAST BP <80 MM HG: CPT | Mod: CPTII,S$GLB,, | Performed by: FAMILY MEDICINE

## 2023-10-26 PROCEDURE — 99214 PR OFFICE/OUTPT VISIT, EST, LEVL IV, 30-39 MIN: ICD-10-PCS | Mod: S$GLB,,, | Performed by: FAMILY MEDICINE

## 2023-10-26 PROCEDURE — 1159F PR MEDICATION LIST DOCUMENTED IN MEDICAL RECORD: ICD-10-PCS | Mod: CPTII,S$GLB,, | Performed by: FAMILY MEDICINE

## 2023-10-26 PROCEDURE — 1159F MED LIST DOCD IN RCRD: CPT | Mod: CPTII,S$GLB,, | Performed by: FAMILY MEDICINE

## 2023-10-26 PROCEDURE — 3008F PR BODY MASS INDEX (BMI) DOCUMENTED: ICD-10-PCS | Mod: CPTII,S$GLB,, | Performed by: FAMILY MEDICINE

## 2023-10-26 PROCEDURE — 3008F BODY MASS INDEX DOCD: CPT | Mod: CPTII,S$GLB,, | Performed by: FAMILY MEDICINE

## 2023-10-26 PROCEDURE — 1160F RVW MEDS BY RX/DR IN RCRD: CPT | Mod: CPTII,S$GLB,, | Performed by: FAMILY MEDICINE

## 2023-10-26 PROCEDURE — 99999 PR PBB SHADOW E&M-EST. PATIENT-LVL IV: ICD-10-PCS | Mod: PBBFAC,,, | Performed by: FAMILY MEDICINE

## 2023-10-26 PROCEDURE — 3074F SYST BP LT 130 MM HG: CPT | Mod: CPTII,S$GLB,, | Performed by: FAMILY MEDICINE

## 2023-10-26 PROCEDURE — 99999 PR PBB SHADOW E&M-EST. PATIENT-LVL IV: CPT | Mod: PBBFAC,,, | Performed by: FAMILY MEDICINE

## 2023-10-26 PROCEDURE — 99214 OFFICE O/P EST MOD 30 MIN: CPT | Mod: S$GLB,,, | Performed by: FAMILY MEDICINE

## 2023-10-26 PROCEDURE — 3078F PR MOST RECENT DIASTOLIC BLOOD PRESSURE < 80 MM HG: ICD-10-PCS | Mod: CPTII,S$GLB,, | Performed by: FAMILY MEDICINE

## 2023-10-26 RX ORDER — ALBUTEROL SULFATE 90 UG/1
1-2 AEROSOL, METERED RESPIRATORY (INHALATION) EVERY 6 HOURS PRN
Qty: 8 G | Refills: 0 | Status: SHIPPED | OUTPATIENT
Start: 2023-10-26 | End: 2024-10-25

## 2023-10-26 RX ORDER — PREDNISONE 10 MG/1
10 TABLET ORAL 2 TIMES DAILY
Qty: 10 TABLET | Refills: 0 | Status: SHIPPED | OUTPATIENT
Start: 2023-10-26 | End: 2024-02-20

## 2023-10-26 RX ORDER — PROMETHAZINE HYDROCHLORIDE AND DEXTROMETHORPHAN HYDROBROMIDE 6.25; 15 MG/5ML; MG/5ML
5 SYRUP ORAL EVERY 4 HOURS PRN
Qty: 120 ML | Refills: 1 | Status: SHIPPED | OUTPATIENT
Start: 2023-10-26 | End: 2023-11-05

## 2023-10-26 NOTE — PROGRESS NOTES
"CHIEF COMPLAINT: The patient presents with several days of cough and low-grade fever    HISTORY OF PRESENT ILLNESS: The patient presents with several days of cough and low-grade fever.  The patient has some chest and nasal congestion.  There is no history of asthma.  There is no history of HIV.  There is no history of frequent respiratory infections.  He also complains of fatigue    REVIEW OF SYSTEMS:  GENERAL: No weight loss.  SKIN: No rashes, itching or changes in color or texture of skin.  HEAD: No headaches or recent head trauma.  EYES: Visual acuity fine. No photophobia, ocular pain or diplopia.  EARS: Denies ear pain, discharge or vertigo.  NOSE: No loss of smell, no epistaxis or postnasal drip.  MOUTH & THROAT: No hoarseness or change in voice. No excessive gum bleeding.  NODES: Denies swollen glands.  CHEST: Denies ANTONIO, cyanosis, wheezing, and sputum production.  CARDIOVASCULAR: Denies chest pain, PND, orthopnea or reduced exercise tolerance.  ABDOMEN: Appetite fine. No weight loss. Denies diarrhea, abdominal pain, hematemesis or blood in stool.  URINARY: No flank pain, dysuria or hematuria.  PERIPHERAL VASCULAR: No claudication or cyanosis.  MUSCULOSKELETAL: No joint stiffness or swelling. Denies back pain.  NEUROLOGIC: No history of seizures, paralysis, alteration of gait or coordination.    SOCIAL HISTORY: Unchanged since recent note    PHYSICAL EXAMINATION:   Blood pressure 110/64, pulse 76, height 6' 2" (1.88 m), weight 95.1 kg (209 lb 10.5 oz), SpO2 98 %.    APPEARANCE: Well nourished, well developed, in no acute distress.    HEAD: Normocephalic, atraumatic.  EYES: PERRL. EOMI.  Conjunctivae without injection and  anicteric  NOSE: Mucosa pink. Airway clear.  MOUTH & THROAT: No tonsillar enlargement. No pharyngeal erythema or exudate. No stridor.  NECK: Supple.   NODES: No cervical, axillary or inguinal lymph node enlargement.  CHEST: Lungs demonstrate scattered mild wheezes bilaterally.  No retractions " are noted.  No rales or rhonchi are present.  CARDIOVASCULAR: Normal S1, S2. No rubs, murmurs or gallops.  ABDOMEN: Bowel sounds normal. Not distended. Soft. No tenderness or masses.  No ascites is noted.  MUSCULOSKELETAL:  There is no clubbing, cyanosis, or edema of the extremities x4.  There is full range of motion of the lumbar spine.  There is full range of motion of the extremities x4.  There is no deformity noted.    NEUROLOGIC:       Normal speech development.      Hearing normal.      Normal gait.      Motor and sensory exams grossly normal.  PSYCHIATRIC: Patient is alert and oriented x3.  Thought processes are all normal.  There is no homicidality.  There is no suicidality.  There is no evidence of psychosis.    LABORATORY/RADIOLOGY: Chart reviewed.    ASSESSMENT:   Acute bronchitis and sinusitis  Fatigue    PLAN:  Prednisone Phenergan DM and albuterol inhaler  Patient to call with failure to improve.      2 weeks chest congestion

## 2024-02-04 ENCOUNTER — HOSPITAL ENCOUNTER (OUTPATIENT)
Facility: OTHER | Age: 49
Discharge: HOME | End: 2024-02-05
Attending: EMERGENCY MEDICINE | Admitting: EMERGENCY MEDICINE
Payer: COMMERCIAL

## 2024-02-04 DIAGNOSIS — R10.9 LEFT FLANK PAIN: ICD-10-CM

## 2024-02-04 DIAGNOSIS — N20.0 NEPHROLITHIASIS: Primary | ICD-10-CM

## 2024-02-04 LAB
ALBUMIN SERPL BCP-MCNC: 4.2 G/DL (ref 3.5–5.2)
ALP SERPL-CCNC: 69 U/L (ref 55–135)
ALT SERPL W/O P-5'-P-CCNC: 22 U/L (ref 10–44)
ANION GAP SERPL CALC-SCNC: 10 MMOL/L (ref 8–16)
AST SERPL-CCNC: 31 U/L (ref 10–40)
BACTERIA #/AREA URNS HPF: ABNORMAL /HPF
BASOPHILS # BLD AUTO: 0.04 K/UL (ref 0–0.2)
BASOPHILS NFR BLD: 0.7 % (ref 0–1.9)
BILIRUB SERPL-MCNC: 0.5 MG/DL (ref 0.1–1)
BILIRUB UR QL STRIP: NEGATIVE
BUN SERPL-MCNC: 14 MG/DL (ref 6–20)
CALCIUM SERPL-MCNC: 9.2 MG/DL (ref 8.7–10.5)
CHLORIDE SERPL-SCNC: 110 MMOL/L (ref 95–110)
CLARITY UR: CLEAR
CO2 SERPL-SCNC: 17 MMOL/L (ref 23–29)
COLOR UR: YELLOW
CREAT SERPL-MCNC: 1.5 MG/DL (ref 0.5–1.4)
DIFFERENTIAL METHOD BLD: ABNORMAL
EOSINOPHIL # BLD AUTO: 0 K/UL (ref 0–0.5)
EOSINOPHIL NFR BLD: 0.7 % (ref 0–8)
ERYTHROCYTE [DISTWIDTH] IN BLOOD BY AUTOMATED COUNT: 13.2 % (ref 11.5–14.5)
EST. GFR  (NO RACE VARIABLE): 57 ML/MIN/1.73 M^2
GLUCOSE SERPL-MCNC: 115 MG/DL (ref 70–110)
GLUCOSE UR QL STRIP: NEGATIVE
HCT VFR BLD AUTO: 38.8 % (ref 40–54)
HGB BLD-MCNC: 14 G/DL (ref 14–18)
HGB UR QL STRIP: ABNORMAL
IMM GRANULOCYTES # BLD AUTO: 0.02 K/UL (ref 0–0.04)
IMM GRANULOCYTES NFR BLD AUTO: 0.3 % (ref 0–0.5)
KETONES UR QL STRIP: NEGATIVE
LEUKOCYTE ESTERASE UR QL STRIP: NEGATIVE
LYMPHOCYTES # BLD AUTO: 2.5 K/UL (ref 1–4.8)
LYMPHOCYTES NFR BLD: 42.3 % (ref 18–48)
MCH RBC QN AUTO: 29.6 PG (ref 27–31)
MCHC RBC AUTO-ENTMCNC: 36.1 G/DL (ref 32–36)
MCV RBC AUTO: 82 FL (ref 82–98)
MICROSCOPIC COMMENT: ABNORMAL
MONOCYTES # BLD AUTO: 0.9 K/UL (ref 0.3–1)
MONOCYTES NFR BLD: 14.5 % (ref 4–15)
NEUTROPHILS # BLD AUTO: 2.5 K/UL (ref 1.8–7.7)
NEUTROPHILS NFR BLD: 41.5 % (ref 38–73)
NITRITE UR QL STRIP: NEGATIVE
NRBC BLD-RTO: 0 /100 WBC
PH UR STRIP: 6 [PH] (ref 5–8)
PLATELET # BLD AUTO: 215 K/UL (ref 150–450)
PMV BLD AUTO: 8.9 FL (ref 9.2–12.9)
POTASSIUM SERPL-SCNC: 4.4 MMOL/L (ref 3.5–5.1)
PROT SERPL-MCNC: 7.8 G/DL (ref 6–8.4)
PROT UR QL STRIP: NEGATIVE
RBC # BLD AUTO: 4.73 M/UL (ref 4.6–6.2)
RBC #/AREA URNS HPF: 12 /HPF (ref 0–4)
SODIUM SERPL-SCNC: 137 MMOL/L (ref 136–145)
SP GR UR STRIP: 1.02 (ref 1–1.03)
SQUAMOUS #/AREA URNS HPF: 1 /HPF
URN SPEC COLLECT METH UR: ABNORMAL
UROBILINOGEN UR STRIP-ACNC: NEGATIVE EU/DL
WBC # BLD AUTO: 5.94 K/UL (ref 3.9–12.7)
WBC #/AREA URNS HPF: 1 /HPF (ref 0–5)

## 2024-02-04 PROCEDURE — 87086 URINE CULTURE/COLONY COUNT: CPT

## 2024-02-04 PROCEDURE — 80053 COMPREHEN METABOLIC PANEL: CPT

## 2024-02-04 PROCEDURE — 96374 THER/PROPH/DIAG INJ IV PUSH: CPT

## 2024-02-04 PROCEDURE — G0378 HOSPITAL OBSERVATION PER HR: HCPCS

## 2024-02-04 PROCEDURE — 25000003 PHARM REV CODE 250

## 2024-02-04 PROCEDURE — 63600175 PHARM REV CODE 636 W HCPCS

## 2024-02-04 PROCEDURE — 96361 HYDRATE IV INFUSION ADD-ON: CPT

## 2024-02-04 PROCEDURE — 96375 TX/PRO/DX INJ NEW DRUG ADDON: CPT

## 2024-02-04 PROCEDURE — 99285 EMERGENCY DEPT VISIT HI MDM: CPT | Mod: 25

## 2024-02-04 PROCEDURE — 85025 COMPLETE CBC W/AUTO DIFF WBC: CPT

## 2024-02-04 PROCEDURE — 81000 URINALYSIS NONAUTO W/SCOPE: CPT

## 2024-02-04 RX ORDER — KETOROLAC TROMETHAMINE 30 MG/ML
30 INJECTION, SOLUTION INTRAMUSCULAR; INTRAVENOUS
Status: COMPLETED | OUTPATIENT
Start: 2024-02-04 | End: 2024-02-04

## 2024-02-04 RX ORDER — MORPHINE SULFATE 4 MG/ML
4 INJECTION, SOLUTION INTRAMUSCULAR; INTRAVENOUS
Status: COMPLETED | OUTPATIENT
Start: 2024-02-04 | End: 2024-02-04

## 2024-02-04 RX ORDER — KETOROLAC TROMETHAMINE 30 MG/ML
15 INJECTION, SOLUTION INTRAMUSCULAR; INTRAVENOUS EVERY 6 HOURS PRN
Status: DISCONTINUED | OUTPATIENT
Start: 2024-02-04 | End: 2024-02-05 | Stop reason: HOSPADM

## 2024-02-04 RX ORDER — ACETAMINOPHEN 500 MG
1000 TABLET ORAL EVERY 8 HOURS PRN
Status: DISCONTINUED | OUTPATIENT
Start: 2024-02-04 | End: 2024-02-05 | Stop reason: HOSPADM

## 2024-02-04 RX ORDER — ONDANSETRON HYDROCHLORIDE 2 MG/ML
4 INJECTION, SOLUTION INTRAVENOUS EVERY 8 HOURS PRN
Status: DISCONTINUED | OUTPATIENT
Start: 2024-02-04 | End: 2024-02-05 | Stop reason: HOSPADM

## 2024-02-04 RX ORDER — SODIUM CHLORIDE 0.9 % (FLUSH) 0.9 %
10 SYRINGE (ML) INJECTION
Status: DISCONTINUED | OUTPATIENT
Start: 2024-02-04 | End: 2024-02-05 | Stop reason: HOSPADM

## 2024-02-04 RX ORDER — KETOROLAC TROMETHAMINE 30 MG/ML
30 INJECTION, SOLUTION INTRAMUSCULAR; INTRAVENOUS
Status: DISCONTINUED | OUTPATIENT
Start: 2024-02-04 | End: 2024-02-04

## 2024-02-04 RX ORDER — ONDANSETRON HYDROCHLORIDE 2 MG/ML
4 INJECTION, SOLUTION INTRAVENOUS
Status: COMPLETED | OUTPATIENT
Start: 2024-02-04 | End: 2024-02-04

## 2024-02-04 RX ORDER — MORPHINE SULFATE 2 MG/ML
2 INJECTION, SOLUTION INTRAMUSCULAR; INTRAVENOUS
Status: COMPLETED | OUTPATIENT
Start: 2024-02-04 | End: 2024-02-04

## 2024-02-04 RX ORDER — DEXAMETHASONE SODIUM PHOSPHATE 4 MG/ML
8 INJECTION, SOLUTION INTRA-ARTICULAR; INTRALESIONAL; INTRAMUSCULAR; INTRAVENOUS; SOFT TISSUE
Status: DISCONTINUED | OUTPATIENT
Start: 2024-02-04 | End: 2024-02-04

## 2024-02-04 RX ORDER — HYDROMORPHONE HYDROCHLORIDE 1 MG/ML
1 INJECTION, SOLUTION INTRAMUSCULAR; INTRAVENOUS; SUBCUTANEOUS EVERY 4 HOURS PRN
Status: DISCONTINUED | OUTPATIENT
Start: 2024-02-04 | End: 2024-02-05

## 2024-02-04 RX ORDER — OXYCODONE AND ACETAMINOPHEN 5; 325 MG/1; MG/1
2 TABLET ORAL
Status: COMPLETED | OUTPATIENT
Start: 2024-02-04 | End: 2024-02-04

## 2024-02-04 RX ORDER — TALC
6 POWDER (GRAM) TOPICAL NIGHTLY PRN
Status: DISCONTINUED | OUTPATIENT
Start: 2024-02-04 | End: 2024-02-05 | Stop reason: HOSPADM

## 2024-02-04 RX ORDER — TAMSULOSIN HYDROCHLORIDE 0.4 MG/1
0.4 CAPSULE ORAL
Status: COMPLETED | OUTPATIENT
Start: 2024-02-04 | End: 2024-02-04

## 2024-02-04 RX ORDER — SODIUM CHLORIDE 9 MG/ML
INJECTION, SOLUTION INTRAVENOUS CONTINUOUS
Status: DISCONTINUED | OUTPATIENT
Start: 2024-02-04 | End: 2024-02-05 | Stop reason: HOSPADM

## 2024-02-04 RX ORDER — HYDROMORPHONE HYDROCHLORIDE 1 MG/ML
1 INJECTION, SOLUTION INTRAMUSCULAR; INTRAVENOUS; SUBCUTANEOUS
Status: COMPLETED | OUTPATIENT
Start: 2024-02-04 | End: 2024-02-04

## 2024-02-04 RX ADMIN — SODIUM CHLORIDE 1000 ML: 0.9 INJECTION, SOLUTION INTRAVENOUS at 04:02

## 2024-02-04 RX ADMIN — HYDROMORPHONE HYDROCHLORIDE 1 MG: 0.5 INJECTION, SOLUTION INTRAMUSCULAR; INTRAVENOUS; SUBCUTANEOUS at 08:02

## 2024-02-04 RX ADMIN — ONDANSETRON 4 MG: 2 INJECTION INTRAMUSCULAR; INTRAVENOUS at 04:02

## 2024-02-04 RX ADMIN — MORPHINE SULFATE 4 MG: 4 INJECTION, SOLUTION INTRAMUSCULAR; INTRAVENOUS at 04:02

## 2024-02-04 RX ADMIN — MORPHINE SULFATE 2 MG: 2 INJECTION, SOLUTION INTRAMUSCULAR; INTRAVENOUS at 05:02

## 2024-02-04 RX ADMIN — OXYCODONE HYDROCHLORIDE AND ACETAMINOPHEN 2 TABLET: 5; 325 TABLET ORAL at 06:02

## 2024-02-04 RX ADMIN — SODIUM CHLORIDE 175 ML/HR: 9 INJECTION, SOLUTION INTRAVENOUS at 09:02

## 2024-02-04 RX ADMIN — ONDANSETRON 4 MG: 2 INJECTION INTRAMUSCULAR; INTRAVENOUS at 08:02

## 2024-02-04 RX ADMIN — TAMSULOSIN HYDROCHLORIDE 0.4 MG: 0.4 CAPSULE ORAL at 06:02

## 2024-02-04 RX ADMIN — KETOROLAC TROMETHAMINE 30 MG: 30 INJECTION, SOLUTION INTRAMUSCULAR; INTRAVENOUS at 04:02

## 2024-02-04 NOTE — ED PROVIDER NOTES
Encounter Date: 2/4/2024       History     Chief Complaint   Patient presents with    Flank Pain     Pt states that he started to have L sided flank pain yesterday hx of kidney stones.      This is a 48-year-old male with history of nephrolithiasis, sciatica, asthma who presents to the ED with complaints of left flank pain since yesterday.  Patient states that the pain significantly worsened just prior to arrival suddenly.  He states that the pain feels the same as when he has had kidney stones in the past.  He has not taken anything for alleviation of symptoms.  Describes the pain as sharp and stabbing and radiating around the front of his left abdomen. There is associated nausea and vomiting. There are no associated urinary symptoms.  Denies fever, chills, chest pain, shortness of breath.    The history is provided by the patient.     Review of patient's allergies indicates:  No Known Allergies  Past Medical History:   Diagnosis Date    Arthritis     Asthma     Renal disorder     Kidney stones    Sciatica      Past Surgical History:   Procedure Laterality Date    COLONOSCOPY N/A 3/27/2018    Procedure: COLONOSCOPY;  Surgeon: Sara Rios MD;  Location: [facility];  Service: Endoscopy;  Laterality: N/A;     Family History   Problem Relation Age of Onset    No Known Problems Mother     No Known Problems Father      Social History     Tobacco Use    Smoking status: Never    Smokeless tobacco: Never   Substance Use Topics    Alcohol use: Yes     Comment: occasionally.    Drug use: Yes     Types: Marijuana     Review of Systems  As per HPI above  Physical Exam     Initial Vitals [02/04/24 1624]   BP Pulse Resp Temp SpO2   137/87 66 (!) 22 98.2 °F (36.8 °C) 99 %      MAP       --         Physical Exam    Constitutional: Vital signs are normal. He appears well-developed and well-nourished. He is cooperative.  Non-toxic appearance. He does not appear ill. He appears distressed.   HENT:   Head: Normocephalic and  atraumatic.   Eyes: Conjunctivae and lids are normal.   Neck: Trachea normal. Neck supple. No stridor present.   Cardiovascular:  Normal rate and regular rhythm.           Pulmonary/Chest: Effort normal. No tachypnea. No respiratory distress.   Abdominal: Abdomen is soft and flat. Bowel sounds are normal. He exhibits no distension. There is abdominal tenderness in the left lower quadrant.   No right CVA tenderness.  There is left CVA tenderness.   Musculoskeletal:      Cervical back: Neck supple.     Neurological: He is alert and oriented to person, place, and time. GCS eye subscore is 4. GCS verbal subscore is 5. GCS motor subscore is 6.   Skin: Skin is warm, dry and intact. No rash noted.   Psychiatric: He has a normal mood and affect. His speech is normal and behavior is normal. Thought content normal.         ED Course   Procedures  Labs Reviewed   CBC W/ AUTO DIFFERENTIAL - Abnormal; Notable for the following components:       Result Value    Hematocrit 38.8 (*)     MCHC 36.1 (*)     MPV 8.9 (*)     All other components within normal limits   COMPREHENSIVE METABOLIC PANEL - Abnormal; Notable for the following components:    CO2 17 (*)     Glucose 115 (*)     Creatinine 1.5 (*)     eGFR 57 (*)     All other components within normal limits   URINALYSIS, REFLEX TO URINE CULTURE          Imaging Results              CT Renal Stone Study ABD Pelvis WO (Final result)  Result time 02/04/24 18:10:27      Final result by Heather Canchola MD (02/04/24 18:10:27)                   Impression:      6-7 mm left proximal ureteral calculus with resultant mild hydronephrosis.  6 mm left intrarenal calculus.    Colonic diverticulosis.    Prostatomegaly.      Electronically signed by: Heather Canchola  Date:    02/04/2024  Time:    18:10               Narrative:    EXAMINATION:  CT RENAL STONE STUDY ABDOMEN PELVIS WITHOUT    CLINICAL HISTORY:  Flank pain    TECHNIQUE:  5 mm unenhanced axial images from the lung bases through  the greater trochanters were performed.  Coronal and sagittal reformatted images were provided.    COMPARISON:  01/06/2021    FINDINGS:  Within the limits of a noncontrast examination, the liver, spleen, pancreas, and adrenal glands are unremarkable.  The gallbladder contains no calcified gallstones.    There is 6-7 mm left proximal ureteral calculus.  There is associated mild left hydronephrosis.  There is a 6 mm left renal calculus.    There is no gross abdominal adenopathy or ascites.  There is a tiny fat containing umbilical hernia.    There is colonic diverticulosis.  There are no pelvic masses or adenopathy.  There is no free pelvic fluid.  The appendix is not inflamed.  The prostate gland is enlarged measuring up to 5.6 cm in maximal diameter.  Consider correlation PSA.    At the lung bases, there is mild left basilar dependent atelectatic change.    There is mild levoscoliosis.                                       Medications   HYDROmorphone injection 1 mg (has no administration in time range)   ondansetron injection 4 mg (has no administration in time range)   sodium chloride 0.9% flush 10 mL (has no administration in time range)   melatonin tablet 6 mg (has no administration in time range)   0.9%  NaCl infusion (has no administration in time range)   acetaminophen tablet 1,000 mg (has no administration in time range)   ketorolac injection 15 mg (has no administration in time range)   HYDROmorphone injection 1 mg (has no administration in time range)   ondansetron injection 4 mg (has no administration in time range)   sodium chloride 0.9% bolus 1,000 mL 1,000 mL (0 mLs Intravenous Stopped 2/4/24 1735)   ketorolac injection 30 mg (30 mg Intravenous Given 2/4/24 1635)   morphine injection 4 mg (4 mg Intravenous Given 2/4/24 1645)   ondansetron injection 4 mg (4 mg Intravenous Given 2/4/24 1649)   morphine injection 2 mg (2 mg Intravenous Given 2/4/24 1755)   tamsulosin 24 hr capsule 0.4 mg (0.4 mg Oral Given  2/4/24 1852)   oxyCODONE-acetaminophen 5-325 mg per tablet 2 tablet (2 tablets Oral Given 2/4/24 1852)     Medical Decision Making  Urgent evaluation of an afebrile 48-year-old male with left flank pain and a history of kidney stones.  Patient is hemodynamically stable, but appears distressed and is actively vomiting.  Plan for basic labs, UA, and CT.  Will treat and reassess.    Differential diagnosis includes, but is not limited to:  Musculoskeletal causes, kidney stone, pyelonephritis, and intra-abdominal causes such as diverticulitis and appendicitis, aortic dissection, abdominal aortic aneurysm, colitis, PE, pneumonia.       Amount and/or Complexity of Data Reviewed  Labs: ordered.  Radiology: ordered. Decision-making details documented in ED Course.    Risk  OTC drugs.  Prescription drug management.               ED Course as of 02/04/24 1937   Sun Feb 04, 2024   1703 CBC without leukocytosis or anemia [SHARA]   1718 Kidney function at baseline [SHARA]   1847 CT Renal Stone Study ABD Pelvis WO  There is 6-7 mm left proximal ureteral calculus.  There is associated mild left hydronephrosis.  There is a 6 mm left renal calculus. [SHARA]   1931 Patient's pain not well controlled with multiple doses of narcotic pain medications. He states he does not feel comfortable going home because he will end up right back here. Plan to admit to EDOU for pain control. Will dose toradol, dilaudid, and antiemetics. Will consult urology to see him in the morning. Patient agreeable with this plan. [SHARA]      ED Course User Index  [SHARA] Saab Wilkins PA-C                         Clinical Impression:  Final diagnoses:  [N20.0] Nephrolithiasis          ED Disposition Condition    Observation                 Saba Wilkins PA-C  02/04/24 1938

## 2024-02-04 NOTE — Clinical Note
"Hardik Reid" Tony was seen and treated in our emergency department on 2/4/2024.  He may return to work on 02/08/2024.       If you have any questions or concerns, please don't hesitate to call.      Zulema Bhagat, FNP"

## 2024-02-04 NOTE — Clinical Note
Diagnosis: Nephrolithiasis [199102]   Future Attending Provider: AXEL RAMIREZ II [[id number]]   Is the patient being sent to ED Observation?: Yes

## 2024-02-04 NOTE — ED TRIAGE NOTES
Pt presents to the ED wit c/o flank pain, onset yesterday. Pt reports hx of kidney stones and states the pain feels  the same as before. Pt AAOx3, NAD noted.

## 2024-02-05 VITALS
HEART RATE: 61 BPM | OXYGEN SATURATION: 99 % | HEIGHT: 74 IN | RESPIRATION RATE: 18 BRPM | TEMPERATURE: 98 F | BODY MASS INDEX: 26.95 KG/M2 | DIASTOLIC BLOOD PRESSURE: 66 MMHG | WEIGHT: 210 LBS | SYSTOLIC BLOOD PRESSURE: 108 MMHG

## 2024-02-05 DIAGNOSIS — N20.1 URETERAL CALCULUS, LEFT: ICD-10-CM

## 2024-02-05 DIAGNOSIS — N20.0 LEFT NEPHROLITHIASIS: Primary | ICD-10-CM

## 2024-02-05 LAB
ANION GAP SERPL CALC-SCNC: 5 MMOL/L (ref 8–16)
BASOPHILS # BLD AUTO: 0.02 K/UL (ref 0–0.2)
BASOPHILS NFR BLD: 0.3 % (ref 0–1.9)
BUN SERPL-MCNC: 13 MG/DL (ref 6–20)
CALCIUM SERPL-MCNC: 8.1 MG/DL (ref 8.7–10.5)
CHLORIDE SERPL-SCNC: 112 MMOL/L (ref 95–110)
CO2 SERPL-SCNC: 21 MMOL/L (ref 23–29)
CREAT SERPL-MCNC: 1.3 MG/DL (ref 0.5–1.4)
DIFFERENTIAL METHOD BLD: ABNORMAL
EOSINOPHIL # BLD AUTO: 0.1 K/UL (ref 0–0.5)
EOSINOPHIL NFR BLD: 1.1 % (ref 0–8)
ERYTHROCYTE [DISTWIDTH] IN BLOOD BY AUTOMATED COUNT: 13.4 % (ref 11.5–14.5)
EST. GFR  (NO RACE VARIABLE): >60 ML/MIN/1.73 M^2
GLUCOSE SERPL-MCNC: 94 MG/DL (ref 70–110)
HCT VFR BLD AUTO: 34.8 % (ref 40–54)
HGB BLD-MCNC: 12.3 G/DL (ref 14–18)
IMM GRANULOCYTES # BLD AUTO: 0.02 K/UL (ref 0–0.04)
IMM GRANULOCYTES NFR BLD AUTO: 0.3 % (ref 0–0.5)
LYMPHOCYTES # BLD AUTO: 2.3 K/UL (ref 1–4.8)
LYMPHOCYTES NFR BLD: 30.8 % (ref 18–48)
MCH RBC QN AUTO: 29.4 PG (ref 27–31)
MCHC RBC AUTO-ENTMCNC: 35.3 G/DL (ref 32–36)
MCV RBC AUTO: 83 FL (ref 82–98)
MONOCYTES # BLD AUTO: 0.9 K/UL (ref 0.3–1)
MONOCYTES NFR BLD: 12.2 % (ref 4–15)
NEUTROPHILS # BLD AUTO: 4.1 K/UL (ref 1.8–7.7)
NEUTROPHILS NFR BLD: 55.3 % (ref 38–73)
NRBC BLD-RTO: 0 /100 WBC
PLATELET # BLD AUTO: 191 K/UL (ref 150–450)
PMV BLD AUTO: 9 FL (ref 9.2–12.9)
POTASSIUM SERPL-SCNC: 4 MMOL/L (ref 3.5–5.1)
RBC # BLD AUTO: 4.18 M/UL (ref 4.6–6.2)
SODIUM SERPL-SCNC: 138 MMOL/L (ref 136–145)
WBC # BLD AUTO: 7.4 K/UL (ref 3.9–12.7)

## 2024-02-05 PROCEDURE — 63600175 PHARM REV CODE 636 W HCPCS

## 2024-02-05 PROCEDURE — 85025 COMPLETE CBC W/AUTO DIFF WBC: CPT

## 2024-02-05 PROCEDURE — 80048 BASIC METABOLIC PNL TOTAL CA: CPT

## 2024-02-05 PROCEDURE — 36415 COLL VENOUS BLD VENIPUNCTURE: CPT

## 2024-02-05 PROCEDURE — G0378 HOSPITAL OBSERVATION PER HR: HCPCS

## 2024-02-05 PROCEDURE — 99204 OFFICE O/P NEW MOD 45 MIN: CPT | Mod: ,,, | Performed by: UROLOGY

## 2024-02-05 PROCEDURE — 25000003 PHARM REV CODE 250

## 2024-02-05 RX ORDER — TAMSULOSIN HYDROCHLORIDE 0.4 MG/1
0.4 CAPSULE ORAL NIGHTLY
Qty: 10 CAPSULE | Refills: 0 | Status: SHIPPED | OUTPATIENT
Start: 2024-02-05 | End: 2024-02-15

## 2024-02-05 RX ORDER — HYDROMORPHONE HYDROCHLORIDE 1 MG/ML
1 INJECTION, SOLUTION INTRAMUSCULAR; INTRAVENOUS; SUBCUTANEOUS
Status: COMPLETED | OUTPATIENT
Start: 2024-02-05 | End: 2024-02-05

## 2024-02-05 RX ORDER — ONDANSETRON 4 MG/1
4 TABLET, ORALLY DISINTEGRATING ORAL EVERY 6 HOURS PRN
Qty: 20 TABLET | Refills: 0 | Status: SHIPPED | OUTPATIENT
Start: 2024-02-05 | End: 2024-02-05 | Stop reason: SDUPTHER

## 2024-02-05 RX ORDER — HYDROCODONE BITARTRATE AND ACETAMINOPHEN 5; 325 MG/1; MG/1
1 TABLET ORAL EVERY 4 HOURS PRN
Status: DISCONTINUED | OUTPATIENT
Start: 2024-02-05 | End: 2024-02-05 | Stop reason: HOSPADM

## 2024-02-05 RX ORDER — LIDOCAINE HYDROCHLORIDE 20 MG/ML
JELLY TOPICAL ONCE
Status: CANCELLED | OUTPATIENT
Start: 2024-02-05 | End: 2024-02-05

## 2024-02-05 RX ORDER — HYDROCODONE BITARTRATE AND ACETAMINOPHEN 5; 325 MG/1; MG/1
1 TABLET ORAL 4 TIMES DAILY
Qty: 12 TABLET | Refills: 0 | Status: SHIPPED | OUTPATIENT
Start: 2024-02-05 | End: 2024-02-05 | Stop reason: ALTCHOICE

## 2024-02-05 RX ORDER — OXYCODONE AND ACETAMINOPHEN 5; 325 MG/1; MG/1
1 TABLET ORAL EVERY 4 HOURS PRN
Qty: 20 TABLET | Refills: 0 | Status: ON HOLD | OUTPATIENT
Start: 2024-02-05 | End: 2024-02-23

## 2024-02-05 RX ORDER — TAMSULOSIN HYDROCHLORIDE 0.4 MG/1
0.4 CAPSULE ORAL NIGHTLY
Qty: 30 CAPSULE | Refills: 0 | Status: SHIPPED | OUTPATIENT
Start: 2024-02-05 | End: 2024-02-05 | Stop reason: SDUPTHER

## 2024-02-05 RX ORDER — KETOROLAC TROMETHAMINE 10 MG/1
10 TABLET, FILM COATED ORAL EVERY 6 HOURS
Qty: 12 TABLET | Refills: 0 | Status: SHIPPED | OUTPATIENT
Start: 2024-02-05 | End: 2024-02-08

## 2024-02-05 RX ORDER — ONDANSETRON 4 MG/1
4 TABLET, ORALLY DISINTEGRATING ORAL EVERY 8 HOURS PRN
Qty: 30 TABLET | Refills: 0 | Status: SHIPPED | OUTPATIENT
Start: 2024-02-05 | End: 2024-02-20

## 2024-02-05 RX ADMIN — HYDROMORPHONE HYDROCHLORIDE 1 MG: 0.5 INJECTION, SOLUTION INTRAMUSCULAR; INTRAVENOUS; SUBCUTANEOUS at 07:02

## 2024-02-05 RX ADMIN — HYDROMORPHONE HYDROCHLORIDE 1 MG: 1 INJECTION, SOLUTION INTRAMUSCULAR; INTRAVENOUS; SUBCUTANEOUS at 11:02

## 2024-02-05 RX ADMIN — SODIUM CHLORIDE: 9 INJECTION, SOLUTION INTRAVENOUS at 04:02

## 2024-02-05 NOTE — ASSESSMENT & PLAN NOTE
47 yo M with 7mm left proximal ureteral stone and pain. Patient reports his pain is now controlled and wishes to avoid surgery and proceed with trial of passage.     - Recommend Medical Expulsive Therapy (MET) for this 7mm left proximal ureteral stone. No indication for ureteral stent or ureteroscopy given no HARLEEN, pain is controlled, urine not concerning for infection.  - Send urine for culture.  - Recommend 0.4mg Flomax nightly.  - Please strain all urine and bring stone to clinic if passed.  - Recommend medicine for pain and nausea control.  - Will set outpatient follow up in 1-2 weeks for follow up of MET.  - Ok for discharge from Urology perspective.  - Return precautions given.

## 2024-02-05 NOTE — DISCHARGE SUMMARY
[facility] ED Observation Unit  Discharge Summary        History of Present Illness:    This is a 48-year-old male with history of nephrolithiasis, sciatica, asthma who presents to the ED with complaints of left flank pain since yesterday.  Patient states that the pain significantly worsened just prior to arrival suddenly.  He states that the pain feels the same as when he has had kidney stones in the past.  He has not taken anything for alleviation of symptoms.  Describes the pain as sharp and stabbing and radiating around the front of his left abdomen. There is associated nausea and vomiting. There are no associated urinary symptoms.  Denies fever, chills, chest pain, shortness of breath.      ED Course:  - CBC without leukocytosis or anemia  - CMP shows Cr 1.5 which is baseline for patient  - CT shows 6-7 mm left proximal ureteral calculus with mild left hydronephrosis  - Pain not controlled after multiple doses of narcotic pain medications and toradol  - Started on flomax  - Admitted to EDOU for pain control and urology consult in the morning     I reviewed the ED Provider Note dated 2/4/2024 prior to my evaluation of this patient.  I reviewed all labs and imaging performed in the Main ED, prior to patient being placed in Observation. Patient was placed in the ED Observation Unit for nephrolithiasis.     Observation Course:    - ANDRÉS overnight, remains afebrile. Pain controlled with toradol and dilaudid overnight. Urology evaluated and recommends Medical Expulsive Therapy (MET) and follow up in clinic outpatient.     Brief Physical Exam/Reassessment   Review of Systems   Constitutional:  Negative for chills and fever.   HENT:  Negative for congestion, nosebleeds and sore throat.    Eyes:  Negative for blurred vision, double vision and photophobia.   Respiratory:  Negative for cough and shortness of breath.    Cardiovascular:  Negative for chest pain, claudication and leg swelling.   Gastrointestinal:  Negative  for diarrhea, nausea and vomiting.   Genitourinary:  Positive for flank pain. Negative for dysuria and urgency.   Musculoskeletal:  Negative for back pain and neck pain.   Skin:  Negative for itching and rash.   Neurological:  Negative for dizziness, weakness and headaches.       Physical Exam    Constitutional: Vital signs are normal. He appears well-developed and well-nourished. He is cooperative.  Non-toxic appearance. He does not appear ill. No distress.   HENT:   Head: Normocephalic and atraumatic.   Eyes: Conjunctivae and lids are normal.   Neck: Trachea normal. Neck supple. No stridor present.   Cardiovascular:  Normal rate and regular rhythm.           Pulmonary/Chest: Effort normal. No tachypnea. No respiratory distress.   Abdominal: Abdomen is soft.   Musculoskeletal:      Cervical back: Neck supple.     Neurological: He is alert and oriented to person, place, and time. GCS eye subscore is 4. GCS verbal subscore is 5. GCS motor subscore is 6.   Skin: Skin is warm, dry and intact. No rash noted.   Psychiatric: He has a normal mood and affect. His speech is normal and behavior is normal. Thought content normal.         Consultants:    Urology recommends discharge with medicine for pain control and flomax, will follow up in clinic.    ED/OBS Workup:  Vitals:    02/05/24 0018 02/05/24 0505 02/05/24 0721 02/05/24 0914   BP: (!) 114/57 119/67  108/66   Pulse: (!) 55 (!) 55  61   Resp: 18 19 18 18   Temp: 97.5 °F (36.4 °C) 97.9 °F (36.6 °C)  98 °F (36.7 °C)   TempSrc: Oral Oral  Oral   SpO2: 98% 97%  99%   Weight:       Height:        02/05/24 1134   BP:    Pulse:    Resp: 18   Temp:    TempSrc:    SpO2:    Weight:    Height:        Labs Reviewed   URINALYSIS, REFLEX TO URINE CULTURE - Abnormal; Notable for the following components:       Result Value    Occult Blood UA 3+ (*)     All other components within normal limits    Narrative:     Specimen Source->Urine   CBC W/ AUTO DIFFERENTIAL - Abnormal; Notable for  the following components:    Hematocrit 38.8 (*)     MCHC 36.1 (*)     MPV 8.9 (*)     All other components within normal limits   COMPREHENSIVE METABOLIC PANEL - Abnormal; Notable for the following components:    CO2 17 (*)     Glucose 115 (*)     Creatinine 1.5 (*)     eGFR 57 (*)     All other components within normal limits   URINALYSIS MICROSCOPIC - Abnormal; Notable for the following components:    RBC, UA 12 (*)     All other components within normal limits    Narrative:     Specimen Source->Urine   CBC W/ AUTO DIFFERENTIAL - Abnormal; Notable for the following components:    RBC 4.18 (*)     Hemoglobin 12.3 (*)     Hematocrit 34.8 (*)     MPV 9.0 (*)     All other components within normal limits   BASIC METABOLIC PANEL - Abnormal; Notable for the following components:    Chloride 112 (*)     CO2 21 (*)     Calcium 8.1 (*)     Anion Gap 5 (*)     All other components within normal limits   CULTURE, URINE   CULTURE, URINE       CT Renal Stone Study ABD Pelvis WO   Final Result      6-7 mm left proximal ureteral calculus with resultant mild hydronephrosis.  6 mm left intrarenal calculus.      Colonic diverticulosis.      Prostatomegaly.         Electronically signed by: Heather Canchola   Date:    02/04/2024   Time:    18:10          Final Diagnosis:  1. Nephrolithiasis    2. Left flank pain        Plan:  - Patient tolerating p.o, appropriate for outpatient management. Will discharge with short course percocet and toradol for pain as well as zofran for nausea  - flomax nightly  - patient instructed on straining urine and bringing stone to clinic if passes  - outpatient follow up in clinic. Patient educated on signs and symptoms to monitor for and when to return to ED. Patient verbalized understanding agrees with treatment plan. All questions and concerns addressed.       Discharge Condition: Stable    Disposition: Home or Self Care     Time spent on the discharge of the patient including review of hospital  course with the patient. reviewing discharge medications and arranging follow-up care 20 minutes.  Patient was seen and examined on the date of discharge and determined to be suitable for discharge.    Follow Up:   ED Disposition Condition    Discharge Stable            ED Prescriptions       Medication Sig Dispense Start Date End Date Auth. Provider    tamsulosin (FLOMAX) 0.4 mg Cap  (Status: Discontinued) Take 1 capsule (0.4 mg total) by mouth every evening. 30 capsule 2/5/2024 2/5/2024 Zulema Bhagat FNP    HYDROcodone-acetaminophen (NORCO) 5-325 mg per tablet  (Status: Discontinued) Take 1 tablet by mouth 4 (four) times daily. for 3 days 12 tablet 2/5/2024 2/5/2024 Zulema Bhagat FNP    ondansetron (ZOFRAN-ODT) 4 MG TbDL  (Status: Discontinued) Take 1 tablet (4 mg total) by mouth every 6 (six) hours as needed. 20 tablet 2/5/2024 2/5/2024 Zulema Bhagat FNP    oxyCODONE-acetaminophen (PERCOCET) 5-325 mg per tablet Take 1 tablet by mouth every 4 (four) hours as needed for Pain. 20 tablet 2/5/2024 -- Saba Wilkins PA-C    ondansetron (ZOFRAN-ODT) 4 MG TbDL Take 1 tablet (4 mg total) by mouth every 8 (eight) hours as needed (for nausea). 30 tablet 2/5/2024 -- Saba Wilkins PA-C    ketorolac (TORADOL) 10 mg tablet Take 1 tablet (10 mg total) by mouth every 6 (six) hours. for 3 days 12 tablet 2/5/2024 2/8/2024 Saba Wilkins PA-C    tamsulosin (FLOMAX) 0.4 mg Cap Take 1 capsule (0.4 mg total) by mouth every evening. for 10 days 10 capsule 2/5/2024 2/15/2024 Saba Wilkins PA-C          Follow-up Information       Follow up With Specialties Details Why Contact Info    Lesley Villanueva NP Urology Schedule an appointment as soon as possible for a visit in 1 week for follow up [address] [place], [place] [id number]  [phone]      [facility] - Emergency Dept Emergency Medicine Go to  If symptoms worsen [address]  [facility] [id number] [phone] future  Appointments   Date Time Provider Department Center   2/12/2024 10:30 AM Lesley Villanueva NP [facility] UROLOGY [facility]

## 2024-02-05 NOTE — ED NOTES
URINE COLLECTION PENDING: Pt states he does not need to void at this time. Sterile specimen container / Urinal and urine clean catch instructions given to patient. Pt instructed to notify nurse immediately once urine specimen has been obtained. Pt verbalize understanding. Will continue to monitor.

## 2024-02-05 NOTE — ED NOTES
Report received. Pt reporting 8/10 left flank pain. PRN meds given as ordered. Pt given ice and juice cups. Urinal provided. Denies any needs at this time. Call light within reach.

## 2024-02-05 NOTE — H&P
ED Observation Unit  History and Physical      I assumed care of this patient from the Main ED at onset of observation time, 19:36 on 02/04/2024.       History of Present Illness:  This is a 48-year-old male with history of nephrolithiasis, sciatica, asthma who presents to the ED with complaints of left flank pain since yesterday.  Patient states that the pain significantly worsened just prior to arrival suddenly.  He states that the pain feels the same as when he has had kidney stones in the past.  He has not taken anything for alleviation of symptoms.  Describes the pain as sharp and stabbing and radiating around the front of his left abdomen. There is associated nausea and vomiting. There are no associated urinary symptoms.  Denies fever, chills, chest pain, shortness of breath.     ED Course:  - CBC without leukocytosis or anemia  - CMP shows Cr 1.5 which is baseline for patient  - CT shows 6-7 mm left proximal ureteral calculus with mild left hydronephrosis  - Pain not controlled after multiple doses of narcotic pain medications and toradol  - Started on flomax  - Admitted to EDOU for pain control and urology consult in the morning    I reviewed the ED Provider Note dated 2/4/2024 prior to my evaluation of this patient.  I reviewed all labs and imaging performed in the Main ED, prior to patient being placed in Observation. Patient was placed in the ED Observation Unit for nephrolithiasis.    PMHx   Past Medical History:   Diagnosis Date    Arthritis     Asthma     Renal disorder     Kidney stones    Sciatica       Past Surgical History:   Procedure Laterality Date    COLONOSCOPY N/A 3/27/2018    Procedure: COLONOSCOPY;  Surgeon: Sara Rios MD;  Location: [facility];  Service: Endoscopy;  Laterality: N/A;        Family Hx   Family History   Problem Relation Age of Onset    No Known Problems Mother     No Known Problems Father         Social Hx   Social History     Socioeconomic History    Marital status:     Tobacco Use    Smoking status: Never    Smokeless tobacco: Never   Substance and Sexual Activity    Alcohol use: Yes     Comment: occasionally.    Drug use: Yes     Types: Marijuana    Sexual activity: Yes     Partners: Female        Vital Signs   Vitals:    02/04/24 1645 02/04/24 1754 02/04/24 1755 02/04/24 1852   BP:  (!) 160/87     BP Location:  Right arm     Patient Position:  Sitting     Pulse:  72     Resp: 20 (!) 22 (!) 22 16   Temp:       TempSrc:       SpO2:  99%     Weight:       Height:            Review of Systems  Review of Systems   Constitutional:  Negative for chills and fever.   HENT:  Negative for congestion, nosebleeds and sore throat.    Eyes:  Negative for blurred vision, double vision and photophobia.   Respiratory:  Negative for cough and shortness of breath.    Cardiovascular:  Negative for chest pain, claudication and leg swelling.   Gastrointestinal:  Negative for diarrhea, nausea and vomiting.   Genitourinary:  Positive for flank pain. Negative for dysuria and urgency.   Musculoskeletal:  Negative for back pain and neck pain.   Skin:  Negative for itching and rash.   Neurological:  Negative for dizziness, weakness and headaches.       Physical Exam  Physical Exam  Constitutional:       General: He is in acute distress.      Appearance: Normal appearance. He is not toxic-appearing.   HENT:      Head: Normocephalic and atraumatic.   Cardiovascular:      Rate and Rhythm: Normal rate and regular rhythm.   Pulmonary:      Effort: Pulmonary effort is normal. No respiratory distress.   Abdominal:      General: Abdomen is flat.      Tenderness: There is no abdominal tenderness. There is left CVA tenderness. There is no guarding or rebound.   Skin:     General: Skin is warm.      Findings: No rash.   Neurological:      Mental Status: He is alert.         Medications:   Scheduled Meds:   HYDROmorphone  1 mg Intravenous ED 1 Time    ondansetron  4 mg Intravenous ED 1 Time     Continuous  Infusions:   sodium chloride 0.9%       PRN Meds:.acetaminophen, HYDROmorphone, ketorolac, melatonin, ondansetron, sodium chloride 0.9%      Assessment/Plan:  Left proximal ureteral calculus  Mild hydronephrosis  - prn analgesia, anti-emetics, maintenance fluids  - urology consult in morning, appreciate recs  - repeat labs

## 2024-02-05 NOTE — SUBJECTIVE & OBJECTIVE
Past Medical History:   Diagnosis Date    Arthritis     Asthma     Renal disorder     Kidney stones    Sciatica        Past Surgical History:   Procedure Laterality Date    COLONOSCOPY N/A 3/27/2018    Procedure: COLONOSCOPY;  Surgeon: Sara Rios MD;  Location: [facility];  Service: Endoscopy;  Laterality: N/A;       Review of patient's allergies indicates:  No Known Allergies    Family History       Problem Relation (Age of Onset)    No Known Problems Mother, Father            Tobacco Use    Smoking status: Never    Smokeless tobacco: Never   Substance and Sexual Activity    Alcohol use: Yes     Comment: occasionally.    Drug use: Yes     Types: Marijuana    Sexual activity: Yes     Partners: Female       Review of Systems   Constitutional:  Negative for activity change, appetite change, chills, fever and unexpected weight change.   Respiratory:  Negative for shortness of breath.    Cardiovascular:  Negative for chest pain.   Gastrointestinal:  Positive for vomiting. Negative for abdominal pain, constipation, diarrhea and nausea.   Genitourinary:  Positive for flank pain. Negative for difficulty urinating, dysuria and hematuria.   Musculoskeletal:  Negative for back pain.   Skin:  Negative for wound.   Neurological:  Negative for headaches.   Psychiatric/Behavioral:  Negative for confusion.        Objective:     Temp:  [97.5 °F (36.4 °C)-98.2 °F (36.8 °C)] 97.9 °F (36.6 °C)  Pulse:  [55-72] 55  Resp:  [16-22] 19  SpO2:  [97 %-99 %] 97 %  BP: (114-160)/(57-93) 119/67  Weight: 95.3 kg (210 lb)  Body mass index is 26.96 kg/m².           Drains       None                    Physical Exam  Vitals reviewed.   Constitutional:       General: He is not in acute distress.     Appearance: He is not diaphoretic.   HENT:      Head: Normocephalic and atraumatic.      Nose: Nose normal.   Eyes:      Conjunctiva/sclera: Conjunctivae normal.   Cardiovascular:      Rate and Rhythm: Normal rate.   Pulmonary:      Effort:  Pulmonary effort is normal. No respiratory distress.   Abdominal:      General: There is no distension.      Tenderness: There is no right CVA tenderness or left CVA tenderness.   Musculoskeletal:         General: Normal range of motion.      Cervical back: Normal range of motion.   Skin:     General: Skin is dry.   Neurological:      Mental Status: He is alert.   Psychiatric:         Behavior: Behavior normal.         Thought Content: Thought content normal.          Significant Labs:    BMP:  Recent Labs   Lab 02/04/24  1635      K 4.4      CO2 17*   BUN 14   CREATININE 1.5*   CALCIUM 9.2       CBC:  Recent Labs   Lab 02/04/24  1635   WBC 5.94   HGB 14.0   HCT 38.8*          All pertinent labs results from the past 24 hours have been reviewed.    Significant Imaging:  All pertinent imaging results/findings from the past 24 hours have been reviewed.

## 2024-02-05 NOTE — DISCHARGE INSTRUCTIONS
You may take oxycodone and toradol as needed for pain. Zofran can be used as needed for nausea and vomiting.    Please take flomax nightly as prescribed.    Strain your urine every time in order to try to collect the stone. If you collect the stone, save it to bring to Urology clinic.    Contact the urology clinic to schedule a follow up appointment with Lesley Villanueva.     Return to the ER if your pain is not controlled, worsens, you develop fever, or are unable to urinate, or unable to keep food or water down.

## 2024-02-05 NOTE — CONSULTS
[facility] Emergency Dept (Observation)  Urology  Consult Note    Patient Name: Hardik Jimenez  MRN: [id number]  Admission Date: 2/4/2024  Hospital Length of Stay: 0   Code Status: Full Code   Attending Provider: No att. providers found   Consulting Provider: Maira Barrera MD  Primary Care Physician: Kelvin Pablo,   Principal Problem:<principal problem not specified>    Inpatient consult to Urology  Consult performed by: Maira Barrera MD  Consult ordered by: Saba Wilkins PA-C          Subjective:     HPI:  Patient is a 49 yo M with PMH of nephrolithiasis, sciatica, asthma who presents to the ED with complaints of left flank pain for two days. Patient reports passing a kidney stone many years ago.  He denies fevers, chills, nausea, difficulty urinating, hematuria, or dysuria.  He reports 1 episode of emesis yesterday.  He reports his pain is now controlled.    CT RSS obtained 2/4/24 in the ED shows a 7mm left proximal ureteral stone with mild proximal hydronephrosis. There are no right ureteral stones or right hydronephrosis. Bladder and prostate unremarkable. WBC 5.94. Cr 1.5 (baseline around 1.3-1.5). Clean catch UA nitrite negative, 12 RBC, 1 WBC, no bacteria, 1 SEC. He has been afebrile with normal vitals since admission.     Past Medical History:   Diagnosis Date    Arthritis     Asthma     Renal disorder     Kidney stones    Sciatica        Past Surgical History:   Procedure Laterality Date    COLONOSCOPY N/A 3/27/2018    Procedure: COLONOSCOPY;  Surgeon: Sara Rios MD;  Location: [facility];  Service: Endoscopy;  Laterality: N/A;       Review of patient's allergies indicates:  No Known Allergies    Family History       Problem Relation (Age of Onset)    No Known Problems Mother, Father            Tobacco Use    Smoking status: Never    Smokeless tobacco: Never   Substance and Sexual Activity    Alcohol use: Yes     Comment: occasionally.    Drug use: Yes     Types: Marijuana     Sexual activity: Yes     Partners: Female       Review of Systems   Constitutional:  Negative for activity change, appetite change, chills, fever and unexpected weight change.   Respiratory:  Negative for shortness of breath.    Cardiovascular:  Negative for chest pain.   Gastrointestinal:  Positive for vomiting. Negative for abdominal pain, constipation, diarrhea and nausea.   Genitourinary:  Positive for flank pain. Negative for difficulty urinating, dysuria and hematuria.   Musculoskeletal:  Negative for back pain.   Skin:  Negative for wound.   Neurological:  Negative for headaches.   Psychiatric/Behavioral:  Negative for confusion.        Objective:     Temp:  [97.5 °F (36.4 °C)-98.2 °F (36.8 °C)] 97.9 °F (36.6 °C)  Pulse:  [55-72] 55  Resp:  [16-22] 19  SpO2:  [97 %-99 %] 97 %  BP: (114-160)/(57-93) 119/67  Weight: 95.3 kg (210 lb)  Body mass index is 26.96 kg/m².           Drains       None                    Physical Exam  Vitals reviewed.   Constitutional:       General: He is not in acute distress.     Appearance: He is not diaphoretic.   HENT:      Head: Normocephalic and atraumatic.      Nose: Nose normal.   Eyes:      Conjunctiva/sclera: Conjunctivae normal.   Cardiovascular:      Rate and Rhythm: Normal rate.   Pulmonary:      Effort: Pulmonary effort is normal. No respiratory distress.   Abdominal:      General: There is no distension.      Tenderness: There is no right CVA tenderness or left CVA tenderness.   Musculoskeletal:         General: Normal range of motion.      Cervical back: Normal range of motion.   Skin:     General: Skin is dry.   Neurological:      Mental Status: He is alert.   Psychiatric:         Behavior: Behavior normal.         Thought Content: Thought content normal.          Significant Labs:    BMP:  Recent Labs   Lab 02/04/24  1635      K 4.4      CO2 17*   BUN 14   CREATININE 1.5*   CALCIUM 9.2       CBC:  Recent Labs   Lab 02/04/24  1635   WBC 5.94   HGB 14.0    HCT 38.8*          All pertinent labs results from the past 24 hours have been reviewed.    Significant Imaging:  All pertinent imaging results/findings from the past 24 hours have been reviewed.                    Assessment and Plan:     Left nephrolithiasis  47 yo M with 7mm left proximal ureteral stone and pain. Patient reports his pain is now controlled and wishes to avoid surgery and proceed with trial of passage.     - Recommend Medical Expulsive Therapy (MET) for this 7mm left proximal ureteral stone. No indication for ureteral stent or ureteroscopy given no HARLEEN, pain is controlled, urine not concerning for infection.  - Send urine for culture.  - Recommend 0.4mg Flomax nightly.  - Please strain all urine and bring stone to clinic if passed.  - Recommend medicine for pain and nausea control.  - Will set outpatient follow up in 1-2 weeks for follow up of MET.  - Ok for discharge from Urology perspective.  - Return precautions given.       Discussed all option  Discussed trial of passage, ureteroscopy and ESWL  He prefers ESWL    Will schedule ESWL 2/19 with appt with NP 1 week prior with KUB    VTE Risk Mitigation (From admission, onward)           Ordered     IP VTE LOW RISK PATIENT  Once         02/04/24 1936                    Thank you for your consult. I will sign off. Please contact us if you have any additional questions.    Maira Barrera MD  Urology  [facility] - Emergency Dept (Observation)

## 2024-02-05 NOTE — HPI
Patient is a 47 yo M with PMH of nephrolithiasis, sciatica, asthma who presents to the ED with complaints of left flank pain for two days. Patient reports passing a kidney stone many years ago.  He denies fevers, chills, nausea, difficulty urinating, hematuria, or dysuria.  He reports 1 episode of emesis yesterday.  He reports his pain is now controlled.    CT RSS obtained 2/4/24 in the ED shows a 7mm left proximal ureteral stone with mild proximal hydronephrosis. There are no right ureteral stones or right hydronephrosis. Bladder and prostate unremarkable. WBC 5.94. Cr 1.5 (baseline around 1.3-1.5). Clean catch UA nitrite negative, 12 RBC, 1 WBC, no bacteria, 1 SEC. He has been afebrile with normal vitals since admission.

## 2024-02-06 LAB — BACTERIA UR CULT: NO GROWTH

## 2024-02-07 ENCOUNTER — HOSPITAL ENCOUNTER (OUTPATIENT)
Facility: OTHER | Age: 49
Discharge: HOME | End: 2024-02-08
Attending: EMERGENCY MEDICINE | Admitting: UROLOGY
Payer: COMMERCIAL

## 2024-02-07 ENCOUNTER — ANESTHESIA (OUTPATIENT)
Dept: SURGERY | Facility: OTHER | Age: 49
End: 2024-02-07
Payer: COMMERCIAL

## 2024-02-07 ENCOUNTER — ANESTHESIA EVENT (OUTPATIENT)
Dept: SURGERY | Facility: OTHER | Age: 49
End: 2024-02-07
Payer: COMMERCIAL

## 2024-02-07 DIAGNOSIS — N20.0 NEPHROLITHIASIS: ICD-10-CM

## 2024-02-07 PROBLEM — N50.812 LEFT TESTICULAR PAIN: Status: ACTIVE | Noted: 2024-02-07

## 2024-02-07 LAB
ANION GAP SERPL CALC-SCNC: 5 MMOL/L (ref 8–16)
BASOPHILS # BLD AUTO: 0.02 K/UL (ref 0–0.2)
BASOPHILS NFR BLD: 0.3 % (ref 0–1.9)
BILIRUB UR QL STRIP: NEGATIVE
BUN SERPL-MCNC: 15 MG/DL (ref 6–20)
CALCIUM SERPL-MCNC: 9.3 MG/DL (ref 8.7–10.5)
CHLORIDE SERPL-SCNC: 107 MMOL/L (ref 95–110)
CLARITY UR: CLEAR
CO2 SERPL-SCNC: 24 MMOL/L (ref 23–29)
COLOR UR: YELLOW
CREAT SERPL-MCNC: 1.4 MG/DL (ref 0.5–1.4)
DIFFERENTIAL METHOD BLD: ABNORMAL
EOSINOPHIL # BLD AUTO: 0 K/UL (ref 0–0.5)
EOSINOPHIL NFR BLD: 0 % (ref 0–8)
ERYTHROCYTE [DISTWIDTH] IN BLOOD BY AUTOMATED COUNT: 13.1 % (ref 11.5–14.5)
EST. GFR  (NO RACE VARIABLE): >60 ML/MIN/1.73 M^2
GLUCOSE SERPL-MCNC: 136 MG/DL (ref 70–110)
GLUCOSE UR QL STRIP: NEGATIVE
HCT VFR BLD AUTO: 36.9 % (ref 40–54)
HGB BLD-MCNC: 13.1 G/DL (ref 14–18)
HGB UR QL STRIP: NEGATIVE
IMM GRANULOCYTES # BLD AUTO: 0.02 K/UL (ref 0–0.04)
IMM GRANULOCYTES NFR BLD AUTO: 0.3 % (ref 0–0.5)
KETONES UR QL STRIP: NEGATIVE
LEUKOCYTE ESTERASE UR QL STRIP: NEGATIVE
LYMPHOCYTES # BLD AUTO: 0.9 K/UL (ref 1–4.8)
LYMPHOCYTES NFR BLD: 12.1 % (ref 18–48)
MCH RBC QN AUTO: 29.6 PG (ref 27–31)
MCHC RBC AUTO-ENTMCNC: 35.5 G/DL (ref 32–36)
MCV RBC AUTO: 83 FL (ref 82–98)
MONOCYTES # BLD AUTO: 0.3 K/UL (ref 0.3–1)
MONOCYTES NFR BLD: 4.6 % (ref 4–15)
NEUTROPHILS # BLD AUTO: 6.1 K/UL (ref 1.8–7.7)
NEUTROPHILS NFR BLD: 82.7 % (ref 38–73)
NITRITE UR QL STRIP: NEGATIVE
NRBC BLD-RTO: 0 /100 WBC
PH UR STRIP: 6 [PH] (ref 5–8)
PLATELET # BLD AUTO: 211 K/UL (ref 150–450)
PMV BLD AUTO: 9.4 FL (ref 9.2–12.9)
POTASSIUM SERPL-SCNC: 4.3 MMOL/L (ref 3.5–5.1)
PROT UR QL STRIP: NEGATIVE
RBC # BLD AUTO: 4.43 M/UL (ref 4.6–6.2)
SODIUM SERPL-SCNC: 136 MMOL/L (ref 136–145)
SP GR UR STRIP: 1.02 (ref 1–1.03)
URN SPEC COLLECT METH UR: NORMAL
UROBILINOGEN UR STRIP-ACNC: NEGATIVE EU/DL
WBC # BLD AUTO: 7.42 K/UL (ref 3.9–12.7)

## 2024-02-07 PROCEDURE — 25000003 PHARM REV CODE 250

## 2024-02-07 PROCEDURE — 63600175 PHARM REV CODE 636 W HCPCS

## 2024-02-07 PROCEDURE — 37000008 HC ANESTHESIA 1ST 15 MINUTES: Performed by: UROLOGY

## 2024-02-07 PROCEDURE — D9220A PRA ANESTHESIA: Mod: ,,, | Performed by: STUDENT IN AN ORGANIZED HEALTH CARE EDUCATION/TRAINING PROGRAM

## 2024-02-07 PROCEDURE — 96376 TX/PRO/DX INJ SAME DRUG ADON: CPT

## 2024-02-07 PROCEDURE — 63600175 PHARM REV CODE 636 W HCPCS: Performed by: UROLOGY

## 2024-02-07 PROCEDURE — C2617 STENT, NON-COR, TEM W/O DEL: HCPCS | Performed by: UROLOGY

## 2024-02-07 PROCEDURE — 96374 THER/PROPH/DIAG INJ IV PUSH: CPT

## 2024-02-07 PROCEDURE — 85025 COMPLETE CBC W/AUTO DIFF WBC: CPT

## 2024-02-07 PROCEDURE — 25000003 PHARM REV CODE 250: Performed by: UROLOGY

## 2024-02-07 PROCEDURE — 96361 HYDRATE IV INFUSION ADD-ON: CPT

## 2024-02-07 PROCEDURE — 99284 EMERGENCY DEPT VISIT MOD MDM: CPT | Mod: 25,,, | Performed by: UROLOGY

## 2024-02-07 PROCEDURE — C1769 GUIDE WIRE: HCPCS | Performed by: UROLOGY

## 2024-02-07 PROCEDURE — 37000009 HC ANESTHESIA EA ADD 15 MINS: Performed by: UROLOGY

## 2024-02-07 PROCEDURE — 81003 URINALYSIS AUTO W/O SCOPE: CPT

## 2024-02-07 PROCEDURE — 74420 UROGRAPHY RTRGR +-KUB: CPT | Mod: 26,,, | Performed by: UROLOGY

## 2024-02-07 PROCEDURE — 36000707: Performed by: UROLOGY

## 2024-02-07 PROCEDURE — G0378 HOSPITAL OBSERVATION PER HR: HCPCS

## 2024-02-07 PROCEDURE — 96375 TX/PRO/DX INJ NEW DRUG ADDON: CPT

## 2024-02-07 PROCEDURE — 36000706: Performed by: UROLOGY

## 2024-02-07 PROCEDURE — 99285 EMERGENCY DEPT VISIT HI MDM: CPT | Mod: 25

## 2024-02-07 PROCEDURE — 25000003 PHARM REV CODE 250: Performed by: ANESTHESIOLOGY

## 2024-02-07 PROCEDURE — C1758 CATHETER, URETERAL: HCPCS | Performed by: UROLOGY

## 2024-02-07 PROCEDURE — 25000003 PHARM REV CODE 250: Performed by: STUDENT IN AN ORGANIZED HEALTH CARE EDUCATION/TRAINING PROGRAM

## 2024-02-07 PROCEDURE — 71000016 HC POSTOP RECOV ADDL HR: Performed by: UROLOGY

## 2024-02-07 PROCEDURE — 71000015 HC POSTOP RECOV 1ST HR: Performed by: UROLOGY

## 2024-02-07 PROCEDURE — 52332 CYSTOSCOPY AND TREATMENT: CPT | Mod: LT,,, | Performed by: UROLOGY

## 2024-02-07 PROCEDURE — 80048 BASIC METABOLIC PNL TOTAL CA: CPT

## 2024-02-07 PROCEDURE — 63600175 PHARM REV CODE 636 W HCPCS: Performed by: STUDENT IN AN ORGANIZED HEALTH CARE EDUCATION/TRAINING PROGRAM

## 2024-02-07 DEVICE — STENT URETERAL UNIV 6FR 26CM
Type: IMPLANTABLE DEVICE | Site: URETER | Status: NON-FUNCTIONAL
Removed: 2024-02-22

## 2024-02-07 RX ORDER — CEPHALEXIN 500 MG/1
500 CAPSULE ORAL EVERY 8 HOURS
Status: DISCONTINUED | OUTPATIENT
Start: 2024-02-07 | End: 2024-02-08 | Stop reason: HOSPADM

## 2024-02-07 RX ORDER — SODIUM CHLORIDE 9 MG/ML
INJECTION, SOLUTION INTRAVENOUS CONTINUOUS
Status: DISCONTINUED | OUTPATIENT
Start: 2024-02-07 | End: 2024-02-07

## 2024-02-07 RX ORDER — CEFAZOLIN 2 G/1
INJECTION, POWDER, FOR SOLUTION INTRAMUSCULAR; INTRAVENOUS
Status: DISCONTINUED | OUTPATIENT
Start: 2024-02-07 | End: 2024-02-07

## 2024-02-07 RX ORDER — KETOROLAC TROMETHAMINE 30 MG/ML
15 INJECTION, SOLUTION INTRAMUSCULAR; INTRAVENOUS EVERY 6 HOURS PRN
Status: DISCONTINUED | OUTPATIENT
Start: 2024-02-07 | End: 2024-02-08 | Stop reason: HOSPADM

## 2024-02-07 RX ORDER — IBUPROFEN 800 MG/1
800 TABLET ORAL 3 TIMES DAILY PRN
Qty: 9 TABLET | Refills: 0 | Status: SHIPPED | OUTPATIENT
Start: 2024-02-07 | End: 2024-02-10

## 2024-02-07 RX ORDER — SODIUM CHLORIDE 0.9 % (FLUSH) 0.9 %
3 SYRINGE (ML) INJECTION
Status: DISCONTINUED | OUTPATIENT
Start: 2024-02-07 | End: 2024-02-07 | Stop reason: HOSPADM

## 2024-02-07 RX ORDER — TALC
6 POWDER (GRAM) TOPICAL NIGHTLY PRN
Status: DISCONTINUED | OUTPATIENT
Start: 2024-02-07 | End: 2024-02-07

## 2024-02-07 RX ORDER — HYDROMORPHONE HYDROCHLORIDE 1 MG/ML
1 INJECTION, SOLUTION INTRAMUSCULAR; INTRAVENOUS; SUBCUTANEOUS
Status: COMPLETED | OUTPATIENT
Start: 2024-02-07 | End: 2024-02-07

## 2024-02-07 RX ORDER — OXYCODONE HYDROCHLORIDE 5 MG/1
5 TABLET ORAL
Status: DISCONTINUED | OUTPATIENT
Start: 2024-02-07 | End: 2024-02-07 | Stop reason: HOSPADM

## 2024-02-07 RX ORDER — KETOROLAC TROMETHAMINE 30 MG/ML
15 INJECTION, SOLUTION INTRAMUSCULAR; INTRAVENOUS EVERY 6 HOURS PRN
Status: DISCONTINUED | OUTPATIENT
Start: 2024-02-07 | End: 2024-02-07

## 2024-02-07 RX ORDER — BISACODYL 5 MG
5 TABLET, DELAYED RELEASE (ENTERIC COATED) ORAL ONCE AS NEEDED
Status: DISCONTINUED | OUTPATIENT
Start: 2024-02-08 | End: 2024-02-08 | Stop reason: HOSPADM

## 2024-02-07 RX ORDER — HYDROMORPHONE HYDROCHLORIDE 1 MG/ML
1 INJECTION, SOLUTION INTRAMUSCULAR; INTRAVENOUS; SUBCUTANEOUS EVERY 4 HOURS PRN
Status: DISCONTINUED | OUTPATIENT
Start: 2024-02-07 | End: 2024-02-08

## 2024-02-07 RX ORDER — MIDAZOLAM HYDROCHLORIDE 1 MG/ML
INJECTION INTRAMUSCULAR; INTRAVENOUS
Status: DISCONTINUED | OUTPATIENT
Start: 2024-02-07 | End: 2024-02-07

## 2024-02-07 RX ORDER — FENTANYL CITRATE 50 UG/ML
INJECTION, SOLUTION INTRAMUSCULAR; INTRAVENOUS
Status: DISCONTINUED | OUTPATIENT
Start: 2024-02-07 | End: 2024-02-07

## 2024-02-07 RX ORDER — ONDANSETRON HYDROCHLORIDE 2 MG/ML
4 INJECTION, SOLUTION INTRAVENOUS EVERY 8 HOURS PRN
Status: DISCONTINUED | OUTPATIENT
Start: 2024-02-07 | End: 2024-02-07

## 2024-02-07 RX ORDER — TAMSULOSIN HYDROCHLORIDE 0.4 MG/1
0.4 CAPSULE ORAL DAILY
Qty: 14 CAPSULE | Refills: 0 | Status: ON HOLD | OUTPATIENT
Start: 2024-02-07 | End: 2024-02-22 | Stop reason: HOSPADM

## 2024-02-07 RX ORDER — MEPERIDINE HYDROCHLORIDE 25 MG/ML
12.5 INJECTION INTRAMUSCULAR; INTRAVENOUS; SUBCUTANEOUS ONCE AS NEEDED
Status: DISCONTINUED | OUTPATIENT
Start: 2024-02-07 | End: 2024-02-07 | Stop reason: HOSPADM

## 2024-02-07 RX ORDER — HYDROMORPHONE HYDROCHLORIDE 2 MG/ML
0.4 INJECTION, SOLUTION INTRAMUSCULAR; INTRAVENOUS; SUBCUTANEOUS EVERY 5 MIN PRN
Status: DISCONTINUED | OUTPATIENT
Start: 2024-02-07 | End: 2024-02-07 | Stop reason: HOSPADM

## 2024-02-07 RX ORDER — HYDROCODONE BITARTRATE AND ACETAMINOPHEN 5; 325 MG/1; MG/1
1 TABLET ORAL EVERY 6 HOURS PRN
COMMUNITY
Start: 2024-02-05 | End: 2024-02-20

## 2024-02-07 RX ORDER — OXYBUTYNIN CHLORIDE 5 MG/1
5 TABLET ORAL ONCE
Status: COMPLETED | OUTPATIENT
Start: 2024-02-07 | End: 2024-02-07

## 2024-02-07 RX ORDER — TAMSULOSIN HYDROCHLORIDE 0.4 MG/1
0.4 CAPSULE ORAL NIGHTLY
Status: DISCONTINUED | OUTPATIENT
Start: 2024-02-07 | End: 2024-02-08 | Stop reason: HOSPADM

## 2024-02-07 RX ORDER — HYDROCODONE BITARTRATE AND ACETAMINOPHEN 5; 325 MG/1; MG/1
1 TABLET ORAL EVERY 4 HOURS PRN
Status: DISCONTINUED | OUTPATIENT
Start: 2024-02-07 | End: 2024-02-08 | Stop reason: HOSPADM

## 2024-02-07 RX ORDER — ONDANSETRON HYDROCHLORIDE 2 MG/ML
4 INJECTION, SOLUTION INTRAVENOUS
Status: COMPLETED | OUTPATIENT
Start: 2024-02-07 | End: 2024-02-07

## 2024-02-07 RX ORDER — ONDANSETRON HYDROCHLORIDE 2 MG/ML
4 INJECTION, SOLUTION INTRAVENOUS EVERY 8 HOURS PRN
Status: DISCONTINUED | OUTPATIENT
Start: 2024-02-07 | End: 2024-02-08 | Stop reason: HOSPADM

## 2024-02-07 RX ORDER — PHENAZOPYRIDINE HYDROCHLORIDE 100 MG/1
100 TABLET, FILM COATED ORAL 3 TIMES DAILY PRN
Qty: 30 TABLET | Refills: 0 | Status: SHIPPED | OUTPATIENT
Start: 2024-02-07 | End: 2024-02-17

## 2024-02-07 RX ORDER — PROPOFOL 10 MG/ML
VIAL (ML) INTRAVENOUS
Status: DISCONTINUED | OUTPATIENT
Start: 2024-02-07 | End: 2024-02-07

## 2024-02-07 RX ORDER — ALBUTEROL SULFATE 90 UG/1
2 AEROSOL, METERED RESPIRATORY (INHALATION) EVERY 6 HOURS PRN
Status: DISCONTINUED | OUTPATIENT
Start: 2024-02-07 | End: 2024-02-08 | Stop reason: HOSPADM

## 2024-02-07 RX ORDER — SODIUM CHLORIDE 0.9 % (FLUSH) 0.9 %
10 SYRINGE (ML) INJECTION
Status: DISCONTINUED | OUTPATIENT
Start: 2024-02-07 | End: 2024-02-08 | Stop reason: HOSPADM

## 2024-02-07 RX ORDER — PROPOFOL 10 MG/ML
VIAL (ML) INTRAVENOUS CONTINUOUS PRN
Status: DISCONTINUED | OUTPATIENT
Start: 2024-02-07 | End: 2024-02-07

## 2024-02-07 RX ORDER — ACETAMINOPHEN 500 MG
1000 TABLET ORAL EVERY 8 HOURS PRN
Status: DISCONTINUED | OUTPATIENT
Start: 2024-02-07 | End: 2024-02-08 | Stop reason: HOSPADM

## 2024-02-07 RX ORDER — OXYBUTYNIN CHLORIDE 5 MG/1
5 TABLET ORAL 3 TIMES DAILY PRN
Qty: 30 TABLET | Refills: 0 | Status: SHIPPED | OUTPATIENT
Start: 2024-02-07

## 2024-02-07 RX ORDER — DOCUSATE SODIUM 50 MG/5ML
100 LIQUID ORAL ONCE
Status: COMPLETED | OUTPATIENT
Start: 2024-02-07 | End: 2024-02-07

## 2024-02-07 RX ORDER — PROCHLORPERAZINE EDISYLATE 5 MG/ML
5 INJECTION INTRAMUSCULAR; INTRAVENOUS EVERY 30 MIN PRN
Status: DISCONTINUED | OUTPATIENT
Start: 2024-02-07 | End: 2024-02-07 | Stop reason: HOSPADM

## 2024-02-07 RX ORDER — TALC
6 POWDER (GRAM) TOPICAL NIGHTLY PRN
Status: DISCONTINUED | OUTPATIENT
Start: 2024-02-07 | End: 2024-02-08 | Stop reason: HOSPADM

## 2024-02-07 RX ORDER — LIDOCAINE HYDROCHLORIDE 20 MG/ML
INJECTION INTRAVENOUS
Status: DISCONTINUED | OUTPATIENT
Start: 2024-02-07 | End: 2024-02-07

## 2024-02-07 RX ORDER — SODIUM CHLORIDE 9 MG/ML
INJECTION, SOLUTION INTRAVENOUS CONTINUOUS
Status: DISCONTINUED | OUTPATIENT
Start: 2024-02-07 | End: 2024-02-08 | Stop reason: HOSPADM

## 2024-02-07 RX ORDER — PHENAZOPYRIDINE HYDROCHLORIDE 100 MG/1
200 TABLET, FILM COATED ORAL 3 TIMES DAILY PRN
Status: DISCONTINUED | OUTPATIENT
Start: 2024-02-07 | End: 2024-02-08 | Stop reason: HOSPADM

## 2024-02-07 RX ADMIN — PROPOFOL 50 MG: 10 INJECTION, EMULSION INTRAVENOUS at 03:02

## 2024-02-07 RX ADMIN — SODIUM CHLORIDE, SODIUM LACTATE, POTASSIUM CHLORIDE, AND CALCIUM CHLORIDE: .6; .31; .03; .02 INJECTION, SOLUTION INTRAVENOUS at 03:02

## 2024-02-07 RX ADMIN — FENTANYL CITRATE 100 MCG: 50 INJECTION, SOLUTION INTRAMUSCULAR; INTRAVENOUS at 03:02

## 2024-02-07 RX ADMIN — TAMSULOSIN HYDROCHLORIDE 0.4 MG: 0.4 CAPSULE ORAL at 09:02

## 2024-02-07 RX ADMIN — LIDOCAINE HYDROCHLORIDE 80 MG: 20 INJECTION, SOLUTION INTRAVENOUS at 03:02

## 2024-02-07 RX ADMIN — OXYCODONE HYDROCHLORIDE 5 MG: 5 TABLET ORAL at 06:02

## 2024-02-07 RX ADMIN — HYDROMORPHONE HYDROCHLORIDE 1 MG: 0.5 INJECTION, SOLUTION INTRAMUSCULAR; INTRAVENOUS; SUBCUTANEOUS at 12:02

## 2024-02-07 RX ADMIN — OXYBUTYNIN CHLORIDE 5 MG: 5 TABLET ORAL at 06:02

## 2024-02-07 RX ADMIN — CEFAZOLIN 2 G: 2 INJECTION, POWDER, FOR SOLUTION INTRAMUSCULAR; INTRAVENOUS at 04:02

## 2024-02-07 RX ADMIN — CEPHALEXIN 500 MG: 500 CAPSULE ORAL at 10:02

## 2024-02-07 RX ADMIN — SODIUM CHLORIDE 1000 ML: 9 INJECTION, SOLUTION INTRAVENOUS at 08:02

## 2024-02-07 RX ADMIN — KETOROLAC TROMETHAMINE 15 MG: 30 INJECTION, SOLUTION INTRAMUSCULAR at 06:02

## 2024-02-07 RX ADMIN — SODIUM CHLORIDE: 9 INJECTION, SOLUTION INTRAVENOUS at 12:02

## 2024-02-07 RX ADMIN — SODIUM CHLORIDE: 9 INJECTION, SOLUTION INTRAVENOUS at 11:02

## 2024-02-07 RX ADMIN — PROPOFOL 150 MCG/KG/MIN: 10 INJECTION, EMULSION INTRAVENOUS at 03:02

## 2024-02-07 RX ADMIN — ONDANSETRON 4 MG: 2 INJECTION INTRAMUSCULAR; INTRAVENOUS at 12:02

## 2024-02-07 RX ADMIN — HYDROMORPHONE HYDROCHLORIDE 1 MG: 1 INJECTION, SOLUTION INTRAMUSCULAR; INTRAVENOUS; SUBCUTANEOUS at 08:02

## 2024-02-07 RX ADMIN — DOCUSATE SODIUM 100 MG: 50 LIQUID ORAL at 09:02

## 2024-02-07 RX ADMIN — ONDANSETRON 4 MG: 2 INJECTION INTRAMUSCULAR; INTRAVENOUS at 08:02

## 2024-02-07 RX ADMIN — HYDROMORPHONE HYDROCHLORIDE 1 MG: 0.5 INJECTION, SOLUTION INTRAMUSCULAR; INTRAVENOUS; SUBCUTANEOUS at 07:02

## 2024-02-07 RX ADMIN — MIDAZOLAM HYDROCHLORIDE 2 MG: 1 INJECTION, SOLUTION INTRAMUSCULAR; INTRAVENOUS at 03:02

## 2024-02-07 NOTE — ANESTHESIA POSTPROCEDURE EVALUATION
Anesthesia Post Evaluation    Patient: Hardik Jimenez    Procedure(s) Performed: Procedure(s) (LRB):  CYSTOSCOPY, WITH URETERAL STENT INSERTION (Left)    Final Anesthesia Type: MAC      Patient location during evaluation: [facility]  Patient participation: Yes- Able to Participate  Level of consciousness: awake and alert  Post-procedure vital signs: reviewed and stable  Pain management: adequate  Airway patency: patent    PONV status at discharge: No PONV  Anesthetic complications: no      Cardiovascular status: hemodynamically stable  Respiratory status: unassisted, spontaneous ventilation and room air  Hydration status: euvolemic  Follow-up not needed.              Vitals Value Taken Time   /86 02/07/24 1225   Temp 36.8 °C (98.2 °F) 02/07/24 1225   Pulse 58 02/07/24 1225   Resp 17 02/07/24 1239   SpO2 98 % 02/07/24 1225         No case tracking events are documented in the log.      Pain/Sal Score: Pain Rating Prior to Med Admin: 7 (2/7/2024 12:39 PM)  Pain Rating Post Med Admin: 6 (2/7/2024  9:34 AM)

## 2024-02-07 NOTE — ED TRIAGE NOTES
C/O 10/10 constant diffuse lower ABD pain with n/v/c onset last week; LBM 2/5. Dx L kidney stone with hydro 2/4; lithotripsy scheduled for 2/19. Denies relief with Percocet, Norco, Zofran, Flomax. Denies other complaints. NADN.

## 2024-02-07 NOTE — H&P
ED Observation Unit  History and Physical      I assumed care of this patient from the Main ED at onset of observation time, 11:53 AM  on 02/07/2024.       History of Present Illness:    This is a 48-year-old male who presents to the ED with complaints of left flank pain x1 day.  Patient with known left-sided kidney stone for which he was seen in this ED earlier this week.  Describes the pain as sharp and radiating to his left lower abdomen.  There is associated nausea and vomiting secondary to pain.  He has been taking Toradol and Percocet without alleviation of symptoms.  States he has not constipated secondary to the Percocet.  Patient scheduled an appointment with urology clinic for next week.  Denies fever, chills, dysuria, chest pain, shortness of breath.     I reviewed the ED Provider Note dated 02/07/2024  prior to my evaluation of this patient.  I reviewed all labs and imaging performed in the Main ED, prior to patient being placed in Observation. Patient was placed in the ED Observation Unit for ureterolithiasis    PMHx   Past Medical History:   Diagnosis Date    Arthritis     Asthma     Renal disorder     Kidney stones    Sciatica       Past Surgical History:   Procedure Laterality Date    COLONOSCOPY N/A 3/27/2018    Procedure: COLONOSCOPY;  Surgeon: Sara Rios MD;  Location: [facility];  Service: Endoscopy;  Laterality: N/A;        Family Hx   Family History   Problem Relation Age of Onset    No Known Problems Mother     No Known Problems Father         Social Hx   Social History     Socioeconomic History    Marital status:    Tobacco Use    Smoking status: Never    Smokeless tobacco: Never   Substance and Sexual Activity    Alcohol use: Yes     Comment: occasionally.    Drug use: Yes     Types: Marijuana    Sexual activity: Yes     Partners: Female        Vital Signs   Vitals:    02/07/24 0856 02/07/24 0902 02/07/24 0903 02/07/24 1002   BP:  (!) 152/93  (!) 152/89   BP Location:        Patient Position:       Pulse:   63 (!) 53   Resp: 20      Temp:       TempSrc:       SpO2:   100% 98%   Weight:       Height:            Review of Systems  As per HPI  Physical Exam  Physical Exam  Vitals and nursing note reviewed.   Constitutional:       Appearance: He is well-developed.   HENT:      Head: Normocephalic and atraumatic.   Cardiovascular:      Rate and Rhythm: Normal rate and regular rhythm.   Pulmonary:      Effort: Pulmonary effort is normal. No respiratory distress.      Breath sounds: Normal breath sounds.   Abdominal:      General: Abdomen is flat.      Palpations: Abdomen is soft.      Tenderness: There is no abdominal tenderness.   Skin:     General: Skin is warm and dry.      Capillary Refill: Capillary refill takes less than 2 seconds.   Neurological:      General: No focal deficit present.      Mental Status: He is alert and oriented to person, place, and time.   Psychiatric:         Mood and Affect: Mood normal.         Behavior: Behavior normal.         Medications:   Scheduled Meds:  Continuous Infusions:   sodium chloride 0.9%       PRN Meds:.HYDROmorphone, ketorolac, melatonin, ondansetron, sodium chloride 0.9%      Assessment/Plan:  1. Nephrolithiasis      Plan for analgesics and urology consult.  Urology recommends stent placement and will discharge home after procedure.    Case was discussed with the ED provider.

## 2024-02-07 NOTE — SUBJECTIVE & OBJECTIVE
Past Medical History:   Diagnosis Date    Arthritis     Asthma     Renal disorder     Kidney stones    Sciatica        Past Surgical History:   Procedure Laterality Date    COLONOSCOPY N/A 3/27/2018    Procedure: COLONOSCOPY;  Surgeon: Sara Rios MD;  Location: [facility];  Service: Endoscopy;  Laterality: N/A;       Review of patient's allergies indicates:  No Known Allergies    Family History       Problem Relation (Age of Onset)    No Known Problems Mother, Father            Tobacco Use    Smoking status: Never    Smokeless tobacco: Never   Substance and Sexual Activity    Alcohol use: Yes     Comment: occasionally.    Drug use: Yes     Types: Marijuana    Sexual activity: Yes     Partners: Female       Review of Systems   Constitutional:  Negative for activity change, appetite change, chills, fever and unexpected weight change.   Respiratory:  Negative for shortness of breath.    Cardiovascular:  Negative for chest pain.   Gastrointestinal:  Positive for constipation, nausea and vomiting. Negative for abdominal pain and diarrhea.   Genitourinary:  Positive for flank pain. Negative for difficulty urinating, dysuria and hematuria.   Musculoskeletal:  Negative for back pain.   Skin:  Negative for wound.   Neurological:  Negative for headaches.   Psychiatric/Behavioral:  Negative for confusion.        Objective:     Temp:  [97.6 °F (36.4 °C)] 97.6 °F (36.4 °C)  Pulse:  [53-63] 53  Resp:  [20] 20  SpO2:  [98 %-100 %] 98 %  BP: (147-152)/(83-93) 152/89  Weight: 97.5 kg (215 lb)  Body mass index is 27.6 kg/m².    Date 02/07/24 0700 - 02/08/24 0659   Shift 9934-1740 0187-1760 4863-3803 24 Hour Total   INTAKE   IV Piggyback 1000   1000   Shift Total(mL/kg) 1000(10.3)   1000(10.3)   OUTPUT   Shift Total(mL/kg)       Weight (kg) 97.5 97.5 97.5 97.5          Drains       None                    Physical Exam  Vitals reviewed.   Constitutional:       General: He is not in acute distress.     Appearance: He is not  diaphoretic.   HENT:      Head: Normocephalic and atraumatic.      Nose: Nose normal.   Eyes:      Conjunctiva/sclera: Conjunctivae normal.   Cardiovascular:      Rate and Rhythm: Normal rate.   Pulmonary:      Effort: Pulmonary effort is normal. No respiratory distress.   Abdominal:      General: There is no distension.   Musculoskeletal:         General: Normal range of motion.      Cervical back: Normal range of motion.   Skin:     General: Skin is dry.   Neurological:      Mental Status: He is alert.   Psychiatric:         Behavior: Behavior normal.         Thought Content: Thought content normal.          Significant Labs:    BMP:  Recent Labs   Lab 02/04/24  1635 02/05/24  0646 02/07/24  0859    138 136   K 4.4 4.0 4.3    112* 107   CO2 17* 21* 24   BUN 14 13 15   CREATININE 1.5* 1.3 1.4   CALCIUM 9.2 8.1* 9.3       CBC:  Recent Labs   Lab 02/04/24  1635 02/05/24  0646 02/07/24  0859   WBC 5.94 7.40 7.42   HGB 14.0 12.3* 13.1*   HCT 38.8* 34.8* 36.9*    191 211       All pertinent labs results from the past 24 hours have been reviewed.    Significant Imaging:  All pertinent imaging results/findings from the past 24 hours have been reviewed.

## 2024-02-07 NOTE — OP NOTE
[facility] Surgery ([facility]  Surgery Department  Urology Operative Note    SUMMARY     Date of Procedure: 2/7/2024     Surgeon(s) and Role:     * Skye Chew MD - Primary    Assisting Surgeon: None    Pre-Operative Diagnosis: Nephrolithiasis [N20.0]    Post-Operative Diagnosis: Post-Op Diagnosis Codes:     * Nephrolithiasis [N20.0]    Procedure: Procedure(s) (LRB):  Cystoscopy  Left retrograde pyelogram  Left ureteral stent placement      Anesthesia: Choice    Indication for Procedure: 47yo M with 6mm left proximal ureteral stone as well as left lower pole stone who failed outpatient management secondary to pain.  He is planned for ESWL in the near future.  He was unable to tolerate the pain and nausea and therefore has elected to undergo decompression with ureteral stent placement.  He understands that he will need treatment of the stone and stent removal in the near future.    Description of Procedure: The patient was brought to operating room and placed under general anesthesia. Full time out procedures were performed identifying correct patient, procedure and laterality. Appropriate antibiotics with Ancef were given prior to commencement of surgery. The patient was placed in dorsal lithotomy position and prepped and draped in the usual sterile fashion.    A 22Fr rigid cystoscopy was placed per urethral and passed into the bladder.  Area of urethral stricture was noted in bulbar urethra.  This was able to be passed with the scope without need for formal dilation.  Cystoscopy did not reveal any abnormality of the bladder.  film was obtained.  Stone was initially difficult to visualize in the left proximal ureter.  For this reason, retrograde pyelogram was performed.  The left ureteral orifice was identified and cannulated with a motion wire.  This was passed into the distal aspect of the ureter.  This was then exchanged for a 5 Hebrew open-end ureteral catheter.  Full-strength Omnipaque solution  was used to perform a gentle retrograde pyelogram.  This opacify the ureter.  Filling defect consistent with the stone was identified in the left proximal ureter.  Contrast was noted to opacify the renal collecting system.  Wire was then advanced through the open-ended ureteral catheter and passed into the level of the renal collecting system as confirmed on fluoroscopy.      The guidewire was then exchanged for a 6x26 double-J ureteral stent under direct vision and fluoroscopic guidance.  Good coils were confirmed in both the renal pelvis and bladder.  The string was removed from the stent prior to placement.  Mildly cloudy efflux of urine was noted after placement of the stent.  The bladder was drained and the patient was awaken and transferred to PACU in stable condition.     Findings:  6 mm radiopaque calculus in the left proximal ureter.  Stent without string placed.  Will need definitive treatment of his stones in the future.  Plan for ESWL with Dr. Valencia in the near future.    Complications: No    Estimated Blood Loss (EBL):  Less than 5 cc    Drains:  6 Bulgarian by 26 cm double-J ureteral stent in the left ureter, no string           Implants:   Implant Name Type Inv. Item Serial No.  Lot No. LRB No. Used Action   STENT URETERAL UNIV 6FR 26CM - [id number]  STENT URETERAL UNIV 6FR 26CM  [employer] [id number] Left 1 Implanted       Specimens:   Specimen (24h ago, onward)      None                    Condition: Good    Disposition: PACU - hemodynamically stable.    Attestation: I was present and scrubbed for the entire procedure.    Discharge Note    SUMMARY     Admit Date: 2/7/2024    Discharge Date and Time:  02/07/2024 4:23 PM    Hospital Course (synopsis of major diagnoses, care, treatment, and services provided during the course of the hospital stay): Uncomplicated cysto/stent placement.      Final Diagnosis: Post-Op Diagnosis Codes:     * Nephrolithiasis [N20.0]    Disposition: Home or Self  Care    Follow Up/Patient Instructions:     Medications:  Reconciled Home Medications:      Medication List        START taking these medications      oxybutynin 5 MG Tab  Commonly known as: DITROPAN  Take 1 tablet (5 mg total) by mouth 3 (three) times daily as needed (bladder spasms).     phenazopyridine 100 MG tablet  Commonly known as: PYRIDIUM  Take 1 tablet (100 mg total) by mouth 3 (three) times daily as needed for Pain.            CHANGE how you take these medications      * ibuprofen 600 MG tablet  Commonly known as: ADVIL,MOTRIN  Take 1 tablet (600 mg total) by mouth every 6 (six) hours as needed for Pain.  What changed: Another medication with the same name was added. Make sure you understand how and when to take each.     * ibuprofen 800 MG tablet  Commonly known as: ADVIL,MOTRIN  Take 1 tablet (800 mg total) by mouth 3 (three) times daily as needed for Pain.  What changed: You were already taking a medication with the same name, and this prescription was added. Make sure you understand how and when to take each.     * tamsulosin 0.4 mg Cap  Commonly known as: FLOMAX  Take 1 capsule (0.4 mg total) by mouth every evening. for 10 days  What changed: Another medication with the same name was added. Make sure you understand how and when to take each.     * tamsulosin 0.4 mg Cap  Commonly known as: FLOMAX  Take 1 capsule (0.4 mg total) by mouth once daily.  What changed: You were already taking a medication with the same name, and this prescription was added. Make sure you understand how and when to take each.           * This list has 4 medication(s) that are the same as other medications prescribed for you. Read the directions carefully, and ask your doctor or other care provider to review them with you.                CONTINUE taking these medications      albuterol 90 mcg/actuation inhaler  Commonly known as: PROVENTIL/VENTOLIN HFA  Inhale 1-2 puffs into the lungs every 6 (six) hours as needed for  Wheezing. Rescue     HYDROcodone-acetaminophen 5-325 mg per tablet  Commonly known as: NORCO  Take 1 tablet by mouth every 6 (six) hours as needed.     ketorolac 10 mg tablet  Commonly known as: TORADOL  Take 1 tablet (10 mg total) by mouth every 6 (six) hours. for 3 days     ondansetron 4 MG Tbdl  Commonly known as: ZOFRAN-ODT  Take 1 tablet (4 mg total) by mouth every 8 (eight) hours as needed (for nausea).     oxyCODONE-acetaminophen 5-325 mg per tablet  Commonly known as: PERCOCET  Take 1 tablet by mouth every 4 (four) hours as needed for Pain.     predniSONE 10 MG tablet  Commonly known as: DELTASONE  Take 1 tablet (10 mg total) by mouth 2 (two) times daily.     promethazine 6.25 mg/5 mL syrup  Commonly known as: PHENERGAN  Take 20 mLs (25 mg total) by mouth every 6 (six) hours as needed for Nausea.            ASK your doctor about these medications      cyclobenzaprine 10 MG tablet  Commonly known as: FLEXERIL  Take 1 tablet (10 mg total) by mouth 3 (three) times daily as needed for Muscle spasms.     gabapentin 100 MG capsule  Commonly known as: NEURONTIN  1@ HS X 3 days then one 3X/day X 3 days then two 3X/day X3 days then 3caps 3X/day after.Stay@ most comfortable dose     meloxicam 15 MG tablet  Commonly known as: MOBIC  Take 1 tablet (15 mg total) by mouth once daily.            No discharge procedures on file.   Follow-up Information       Lesley Villanueva NP Follow up on 2/12/2024.    Specialty: Urology  Why: Follow up with imaging prior  Contact information:  [address] [facility] [address] [phone]

## 2024-02-07 NOTE — HPI
Patient is a 47 yo M with PMH of nephrolithiasis, sciatica, asthma who returns to the ED with complaints of left flank pain, nausea, and vomiting. Patient was seen two days ago for the same complaint and opted for trial of passage.  Patient reports passing a kidney stone many years ago.  He denies fevers, chills, difficulty urinating, hematuria, or dysuria.       CT RSS obtained 2/4/24 in the ED shows a 7mm left proximal ureteral stone with mild proximal hydronephrosis. Additional left lower pole non-obstructing stone. There are no right ureteral stones or right hydronephrosis. Bladder and prostate unremarkable. WBC 7. Cr 1.4 (baseline around 1.3-1.5). Clean catch UA nitrite negative. Urine culture from 2/4 no growth.

## 2024-02-07 NOTE — ED PROVIDER NOTES
Encounter Date: 2/7/2024       History     Chief Complaint   Patient presents with    Abdominal Pain    Chills    Fever     Pt. C/o of 10/10 Abdominal pain. Nausea and Vomiting x 1 day     This is a 48-year-old male who presents to the ED with complaints of left flank pain x1 day.  Patient with known left-sided kidney stone for which he was seen in this ED earlier this week.  Describes the pain as sharp and radiating to his left lower abdomen.  There is associated nausea and vomiting secondary to pain.  He has been taking Toradol and Percocet without alleviation of symptoms.  States he has not constipated secondary to the Percocet.  Patient scheduled an appointment with urology clinic for next week.  Denies fever, chills, dysuria, chest pain, shortness of breath.    The history is provided by the patient.     Review of patient's allergies indicates:  No Known Allergies  Past Medical History:   Diagnosis Date    Arthritis     Asthma     Renal disorder     Kidney stones    Sciatica      Past Surgical History:   Procedure Laterality Date    COLONOSCOPY N/A 3/27/2018    Procedure: COLONOSCOPY;  Surgeon: Sara Rios MD;  Location: [facility];  Service: Endoscopy;  Laterality: N/A;     Family History   Problem Relation Age of Onset    No Known Problems Mother     No Known Problems Father      Social History     Tobacco Use    Smoking status: Never    Smokeless tobacco: Never   Substance Use Topics    Alcohol use: Yes     Comment: occasionally.    Drug use: Yes     Types: Marijuana     Review of Systems  As per HPI above  Physical Exam     Initial Vitals [02/07/24 0832]   BP Pulse Resp Temp SpO2   (!) 147/83 (!) 59 20 97.6 °F (36.4 °C) 100 %      MAP       --         Physical Exam    Constitutional: Vital signs are normal. He appears well-developed and well-nourished. He is cooperative.  Non-toxic appearance. He does not appear ill. He appears distressed.   HENT:   Head: Normocephalic and atraumatic.   Eyes:  Conjunctivae and lids are normal.   Neck: Trachea normal. Neck supple. No stridor present.   Cardiovascular:  Normal rate and regular rhythm.           Pulmonary/Chest: Effort normal. No tachypnea. No respiratory distress.   Abdominal: Abdomen is soft and protuberant. He exhibits no distension. There is abdominal tenderness in the left lower quadrant.   No right CVA tenderness.  No left CVA tenderness. There is no rebound and no guarding.   Musculoskeletal:      Cervical back: Neck supple.     Neurological: He is alert and oriented to person, place, and time. GCS eye subscore is 4. GCS verbal subscore is 5. GCS motor subscore is 6.   Skin: Skin is warm, dry and intact. No rash noted.   Psychiatric: He has a normal mood and affect. His speech is normal and behavior is normal. Thought content normal.         ED Course   Procedures  Labs Reviewed   CBC W/ AUTO DIFFERENTIAL - Abnormal; Notable for the following components:       Result Value    RBC 4.43 (*)     Hemoglobin 13.1 (*)     Hematocrit 36.9 (*)     Lymph # 0.9 (*)     Gran % 82.7 (*)     Lymph % 12.1 (*)     All other components within normal limits   BASIC METABOLIC PANEL - Abnormal; Notable for the following components:    Glucose 136 (*)     Anion Gap 5 (*)     All other components within normal limits   URINALYSIS, REFLEX TO URINE CULTURE    Narrative:     Specimen Source->Urine          Imaging Results    None          Medications   sodium chloride 0.9% flush 10 mL (has no administration in time range)   melatonin tablet 6 mg (has no administration in time range)   0.9%  NaCl infusion ( Intravenous New Bag 2/7/24 1241)   ketorolac injection 15 mg (has no administration in time range)   HYDROmorphone injection 1 mg (1 mg Intravenous Given 2/7/24 1239)   ondansetron injection 4 mg (4 mg Intravenous Given 2/7/24 1243)   sodium chloride 0.9% bolus 1,000 mL 1,000 mL (0 mLs Intravenous Stopped 2/7/24 1135)   HYDROmorphone injection 1 mg (1 mg Intravenous Given  2/7/24 0856)   ondansetron injection 4 mg (4 mg Intravenous Given 2/7/24 0857)   docusate 50 mg/5 mL liquid 100 mg (100 mg Oral Given 2/7/24 0905)     Medical Decision Making  Urgent evaluation of an afebrile 48-year-old male with known left-sided kidney stone presenting with continued left flank pain.  Chart review shows that patient seen 02/04/2024 with imaging confirming 6-7 mm left proximal ureteral calculus with resultant mild hydronephrosis.  No renal insufficiency or signs of infected stone at that time.  Pain uncontrolled in the ED and patient was admitted to the ED observation unit.  Urology was consulted, evaluated the patient, and recommended medication extracted therapy with Flomax and analgesia and follow-up in clinic.  Patient then scheduled for lithotripsy on 02/19/2024.  Today, patient appears distressed and in pain, but nontoxic with stable vital signs.  He is mildly bradycardic, but is so at baseline.  Plan for pain control, recheck kidney function, and reassess.    Differential diagnosis includes but is not limited to:  Kidney stone, hydronephrosis, UTI, pyelonephritis    Amount and/or Complexity of Data Reviewed  Labs: ordered.    Risk  OTC drugs.  Prescription drug management.               ED Course as of 02/07/24 1550   Wed Feb 07, 2024   1116 Discussed with urology, Dr. Chew, given patient's poor pain control.  Will admit to ED observation unit for pain control and Urology evaluation.  Discussed with ED observation unit provider MICHAEL Carvalho. patient updated in his agreeable with this plan. [SHARA]      ED Course User Index  [SHARA] Saba Wilkins PA-C                     Clinical Impression:  Final diagnoses:  [N20.0] Nephrolithiasis          ED Disposition Condition    Observation                 Saba Wilkins PA-C  02/07/24 1550

## 2024-02-07 NOTE — ANESTHESIA PREPROCEDURE EVALUATION
02/07/2024  Hardik Jimenez is a 48 y.o., male.      Pre-op Assessment    I have reviewed the Patient Summary Reports.     I have reviewed the Nursing Notes. I have reviewed the NPO Status.   I have reviewed the Medications.     Review of Systems  Anesthesia Hx:  No problems with previous Anesthesia                Social:  Non-Smoker       Hematology/Oncology:    Oncology Normal    -- Anemia:                                  EENT/Dental:  EENT/Dental Normal           Cardiovascular:  Exercise tolerance: good                                           Pulmonary:    Asthma mild                   Renal/:  Chronic Renal Disease renal calculi               Hepatic/GI:  Hepatic/GI Normal                 Neurological:    Neuromuscular Disease,                                   Endocrine:  Endocrine Normal            Psych:  Psychiatric Normal                    Physical Exam  General: Well nourished, Cooperative and Alert    Airway:  Mallampati: II   Mouth Opening: Normal  TM Distance: Normal  Tongue: Normal  Neck ROM: Normal ROM    Dental:  Intact        Anesthesia Plan  Type of Anesthesia, risks & benefits discussed:    Anesthesia Type: Gen ETT  Intra-op Monitoring Plan: Standard ASA Monitors  Post Op Pain Control Plan: multimodal analgesia  Induction:  IV  Airway Plan: Video  Informed Consent: Informed consent signed with the Patient and all parties understand the risks and agree with anesthesia plan.  All questions answered.   ASA Score: 2 Emergent    Ready For Surgery From Anesthesia Perspective.     .

## 2024-02-07 NOTE — DISCHARGE INSTRUCTIONS
Post Cystoscopy Instructions  Do not strain to have a bowel movement  No strenuous exercise x 7 days  No driving while you are on narcotic pain medications    You can expect:  See blood in your urine for a few days    Call the doctor if:  Temperature is greater than 101F  Persistent vomiting and inability to keep food down  Inability to void if you do not have a catheter

## 2024-02-07 NOTE — DISCHARGE SUMMARY
[facility])  Urology  Discharge Summary      Patient Name: Edgardo Jimenez  MRN: [id number]  Admission Date: 2/7/2024  Hospital Length of Stay: 0 days  Discharge Date and Time:  02/07/2024 4:20 PM  Attending Physician: Maira Barrera MD    Discharging Provider: Maira Barrera MD  Primary Care Physician: Kelvin Pablo DO    HPI:   Patient is a 47 yo M with PMH of nephrolithiasis, sciatica, asthma who returns to the ED with complaints of left flank pain, nausea, and vomiting. Patient was seen two days ago for the same complaint and opted for trial of passage.  Patient reports passing a kidney stone many years ago.  He denies fevers, chills, difficulty urinating, hematuria, or dysuria.       CT RSS obtained 2/4/24 in the ED shows a 7mm left proximal ureteral stone with mild proximal hydronephrosis. Additional left lower pole non-obstructing stone. There are no right ureteral stones or right hydronephrosis. Bladder and prostate unremarkable. WBC 7. Cr 1.4 (baseline around 1.3-1.5). Clean catch UA nitrite negative. Urine culture from 2/4 no growth.      Procedure(s) (LRB):  CYSTOSCOPY, WITH URETERAL STENT INSERTION (Left)     Indwelling Lines/Drains at time of discharge:   Lines/Drains/Airways       None                   Hospital Course (synopsis of major diagnoses, care, treatment, and services provided during the course of the hospital stay):    EDGARDO JIMENEZ 48 y.o.male underwent: Procedure(s) (LRB):  CYSTOSCOPY, WITH URETERAL STENT INSERTION (Left). The patient tolerated the procedure well, was transferred to recovery post-op. His vitals remained stable, he was able to void and was discharged home. The patient will follow up in [facility] clinic on 2/12/24 to pre-op planning.      Medications and instructions as below.  For more thorough information, please refer to the hospital record and operative report.    Consults:   Consults (From admission, onward)          Status Ordering  Provider     Inpatient consult to Urology  Once        Provider:  (Not yet assigned)    Completed RACHEL YUEN            Significant Diagnostic Studies:     Pending Diagnostic Studies:       Procedure Component Value Units Date/Time    SURG [place] Surgery Retrograde Pyelogram [[id number]]     Order Status: Sent Lab Status: No result             Final Active Diagnoses:    Diagnosis Date Noted POA    PRINCIPAL PROBLEM:  Left nephrolithiasis [N20.0] 02/26/2018 Yes      Problems Resolved During this Admission:       Discharged Condition: good    Disposition: Home or Self Care    Follow Up:   Follow-up Information       Lesley Villanueva NP Follow up on 2/12/2024.    Specialty: Urology  Why: Follow up with imaging prior  Contact information:  [address] [place] [address] [id number] [phone]                           Patient Instructions:   No discharge procedures on file.  Medications:  Reconciled Home Medications:      Medication List        START taking these medications      oxybutynin 5 MG Tab  Commonly known as: DITROPAN  Take 1 tablet (5 mg total) by mouth 3 (three) times daily as needed (bladder spasms).     phenazopyridine 100 MG tablet  Commonly known as: PYRIDIUM  Take 1 tablet (100 mg total) by mouth 3 (three) times daily as needed for Pain.            CHANGE how you take these medications      * ibuprofen 600 MG tablet  Commonly known as: ADVIL,MOTRIN  Take 1 tablet (600 mg total) by mouth every 6 (six) hours as needed for Pain.  What changed: Another medication with the same name was added. Make sure you understand how and when to take each.     * ibuprofen 800 MG tablet  Commonly known as: ADVIL,MOTRIN  Take 1 tablet (800 mg total) by mouth 3 (three) times daily as needed for Pain.  What changed: You were already taking a medication with the same name, and this prescription was added. Make sure you understand how and when to take each.     * tamsulosin 0.4 mg Cap  Commonly known as: FLOMAX  Take 1  capsule (0.4 mg total) by mouth every evening. for 10 days  What changed: Another medication with the same name was added. Make sure you understand how and when to take each.     * tamsulosin 0.4 mg Cap  Commonly known as: FLOMAX  Take 1 capsule (0.4 mg total) by mouth once daily.  What changed: You were already taking a medication with the same name, and this prescription was added. Make sure you understand how and when to take each.           * This list has 4 medication(s) that are the same as other medications prescribed for you. Read the directions carefully, and ask your doctor or other care provider to review them with you.                CONTINUE taking these medications      albuterol 90 mcg/actuation inhaler  Commonly known as: PROVENTIL/VENTOLIN HFA  Inhale 1-2 puffs into the lungs every 6 (six) hours as needed for Wheezing. Rescue     HYDROcodone-acetaminophen 5-325 mg per tablet  Commonly known as: NORCO  Take 1 tablet by mouth every 6 (six) hours as needed.     ketorolac 10 mg tablet  Commonly known as: TORADOL  Take 1 tablet (10 mg total) by mouth every 6 (six) hours. for 3 days     ondansetron 4 MG Tbdl  Commonly known as: ZOFRAN-ODT  Take 1 tablet (4 mg total) by mouth every 8 (eight) hours as needed (for nausea).     oxyCODONE-acetaminophen 5-325 mg per tablet  Commonly known as: PERCOCET  Take 1 tablet by mouth every 4 (four) hours as needed for Pain.     predniSONE 10 MG tablet  Commonly known as: DELTASONE  Take 1 tablet (10 mg total) by mouth 2 (two) times daily.     promethazine 6.25 mg/5 mL syrup  Commonly known as: PHENERGAN  Take 20 mLs (25 mg total) by mouth every 6 (six) hours as needed for Nausea.            ASK your doctor about these medications      cyclobenzaprine 10 MG tablet  Commonly known as: FLEXERIL  Take 1 tablet (10 mg total) by mouth 3 (three) times daily as needed for Muscle spasms.     gabapentin 100 MG capsule  Commonly known as: NEURONTIN  1@ HS X 3 days then one 3X/day  X 3 days then two 3X/day X3 days then 3caps 3X/day after.Stay@ most comfortable dose     meloxicam 15 MG tablet  Commonly known as: MOBIC  Take 1 tablet (15 mg total) by mouth once daily.              Time spent on the discharge of patient: 15 minutes    Maira Barrera MD  Urology  [facility] ([place])

## 2024-02-07 NOTE — PHARMACY MED REC
"Admission Medication History     The home medication history was taken by Jeanette Sun CPHT      You may go to "Admission" then "Reconcile Home Medications" tabs to review and/or act upon these items.     The patient was able to verbally verify medications.  "

## 2024-02-07 NOTE — ASSESSMENT & PLAN NOTE
47 yo M with 7mm left proximal ureteral stone. He has failed trial of passage.    - Plan for cystoscopy with ureteral stent placement today.  - NPO  - IVF  - Consent  - Pain control  - Nausea control  - Flomax  - Strain all urine  - Can likely be discharged home after stent placement. Patient understands that the stent is not meant to be permanent, and if left in place for too long it can cause infection, renal failure, death, etc.

## 2024-02-07 NOTE — CONSULTS
[facility] Emergency Dept (Observation)  Urology  Consult Note    Patient Name: Hardik Jimenez  MRN: [id number]  Admission Date: 2/7/2024  Hospital Length of Stay: 0   Code Status: Full Code   Attending Provider: No att. providers found   Consulting Provider: Maira Barrera MD  Primary Care Physician: Kelvin Pablo DO  Principal Problem:<principal problem not specified>    Inpatient consult to Urology  Consult performed by: Maira Barrera MD  Consult ordered by: Saba Wilkins PA-C  Reason for consult: ureteral stone          Subjective:     HPI:  Patient is a 47 yo M with PMH of nephrolithiasis, sciatica, asthma who returns to the ED with complaints of left flank pain, nausea, and vomiting. Patient was seen two days ago for the same complaint and opted for trial of passage.  Patient reports passing a kidney stone many years ago.  He denies fevers, chills, difficulty urinating, hematuria, or dysuria.       CT RSS obtained 2/4/24 in the ED shows a 7mm left proximal ureteral stone with mild proximal hydronephrosis. Additional left lower pole non-obstructing stone. There are no right ureteral stones or right hydronephrosis. Bladder and prostate unremarkable. WBC 7. Cr 1.4 (baseline around 1.3-1.5). Clean catch UA nitrite negative. Urine culture from 2/4 no growth. He last ate yesterday. He last had half a glass of water at 3am.     Past Medical History:   Diagnosis Date    Arthritis     Asthma     Renal disorder     Kidney stones    Sciatica        Past Surgical History:   Procedure Laterality Date    COLONOSCOPY N/A 3/27/2018    Procedure: COLONOSCOPY;  Surgeon: Sara Rios MD;  Location: [facility];  Service: Endoscopy;  Laterality: N/A;       Review of patient's allergies indicates:  No Known Allergies    Family History       Problem Relation (Age of Onset)    No Known Problems Mother, Father            Tobacco Use    Smoking status: Never    Smokeless tobacco: Never   Substance and Sexual  Activity    Alcohol use: Yes     Comment: occasionally.    Drug use: Yes     Types: Marijuana    Sexual activity: Yes     Partners: Female       Review of Systems   Constitutional:  Negative for activity change, appetite change, chills, fever and unexpected weight change.   Respiratory:  Negative for shortness of breath.    Cardiovascular:  Negative for chest pain.   Gastrointestinal:  Positive for constipation, nausea and vomiting. Negative for abdominal pain and diarrhea.   Genitourinary:  Positive for flank pain. Negative for difficulty urinating, dysuria and hematuria.   Musculoskeletal:  Negative for back pain.   Skin:  Negative for wound.   Neurological:  Negative for headaches.   Psychiatric/Behavioral:  Negative for confusion.        Objective:     Temp:  [97.6 °F (36.4 °C)] 97.6 °F (36.4 °C)  Pulse:  [53-63] 53  Resp:  [20] 20  SpO2:  [98 %-100 %] 98 %  BP: (147-152)/(83-93) 152/89  Weight: 97.5 kg (215 lb)  Body mass index is 27.6 kg/m².    Date 02/07/24 0700 - 02/08/24 0659   Shift 2743-6025 9194-4790 8273-6922 24 Hour Total   INTAKE   IV Piggyback 1000   1000   Shift Total(mL/kg) 1000(10.3)   1000(10.3)   OUTPUT   Shift Total(mL/kg)       Weight (kg) 97.5 97.5 97.5 97.5          Drains       None                    Physical Exam  Vitals reviewed.   Constitutional:       General: He is not in acute distress.     Appearance: He is not diaphoretic.   HENT:      Head: Normocephalic and atraumatic.      Nose: Nose normal.   Eyes:      Conjunctiva/sclera: Conjunctivae normal.   Cardiovascular:      Rate and Rhythm: Normal rate.   Pulmonary:      Effort: Pulmonary effort is normal. No respiratory distress.   Abdominal:      General: There is no distension.   Musculoskeletal:         General: Normal range of motion.      Cervical back: Normal range of motion.   Skin:     General: Skin is dry.   Neurological:      Mental Status: He is alert.   Psychiatric:         Behavior: Behavior normal.         Thought  Content: Thought content normal.          Significant Labs:    BMP:  Recent Labs   Lab 02/04/24  1635 02/05/24  0646 02/07/24  0859    138 136   K 4.4 4.0 4.3    112* 107   CO2 17* 21* 24   BUN 14 13 15   CREATININE 1.5* 1.3 1.4   CALCIUM 9.2 8.1* 9.3       CBC:  Recent Labs   Lab 02/04/24  1635 02/05/24  0646 02/07/24  0859   WBC 5.94 7.40 7.42   HGB 14.0 12.3* 13.1*   HCT 38.8* 34.8* 36.9*    191 211       All pertinent labs results from the past 24 hours have been reviewed.    Significant Imaging:  All pertinent imaging results/findings from the past 24 hours have been reviewed.                    Assessment and Plan:     Left nephrolithiasis  49 yo M with 7mm left proximal ureteral stone. He has failed trial of passage.    - Plan for cystoscopy with ureteral stent placement today.  - NPO  - IVF  - Consent  - Pain control  - Nausea control  - Flomax  - Strain all urine  - Can likely be discharged home after stent placement. Patient understands that the stent is not meant to be permanent, and if left in place for too long it can cause infection, renal failure, death, etc.         VTE Risk Mitigation (From admission, onward)           Ordered     IP VTE LOW RISK PATIENT  Once         02/07/24 1116                    Thank you for your consult. I will follow-up with patient. Please contact us if you have any additional questions.    Maira Barrera MD  Urology  [facility] - Emergency Dept (Observation)

## 2024-02-07 NOTE — ED TRIAGE NOTES
Patient presents with lower abdominal pain accompanied by N/V and is unable to keep any medications for pain down. Pt also complains of constipation at this time. Lithotripsy scheduled for 2/19.

## 2024-02-08 VITALS
TEMPERATURE: 98 F | RESPIRATION RATE: 18 BRPM | DIASTOLIC BLOOD PRESSURE: 75 MMHG | HEIGHT: 74 IN | SYSTOLIC BLOOD PRESSURE: 137 MMHG | OXYGEN SATURATION: 97 % | WEIGHT: 217.13 LBS | BODY MASS INDEX: 27.87 KG/M2 | HEART RATE: 55 BPM

## 2024-02-08 PROCEDURE — G0378 HOSPITAL OBSERVATION PER HR: HCPCS

## 2024-02-08 PROCEDURE — 27000221 HC OXYGEN, UP TO 24 HOURS

## 2024-02-08 PROCEDURE — 94761 N-INVAS EAR/PLS OXIMETRY MLT: CPT

## 2024-02-08 PROCEDURE — 25000003 PHARM REV CODE 250: Performed by: STUDENT IN AN ORGANIZED HEALTH CARE EDUCATION/TRAINING PROGRAM

## 2024-02-08 PROCEDURE — 25000003 PHARM REV CODE 250: Performed by: UROLOGY

## 2024-02-08 RX ORDER — ONDANSETRON 4 MG/1
4 TABLET, ORALLY DISINTEGRATING ORAL ONCE
Status: COMPLETED | OUTPATIENT
Start: 2024-02-08 | End: 2024-02-08

## 2024-02-08 RX ORDER — OXYBUTYNIN CHLORIDE 5 MG/1
5 TABLET ORAL 3 TIMES DAILY PRN
Status: DISCONTINUED | OUTPATIENT
Start: 2024-02-08 | End: 2024-02-08 | Stop reason: HOSPADM

## 2024-02-08 RX ORDER — KETOROLAC TROMETHAMINE 10 MG/1
10 TABLET, FILM COATED ORAL ONCE
Status: COMPLETED | OUTPATIENT
Start: 2024-02-08 | End: 2024-02-08

## 2024-02-08 RX ORDER — ONDANSETRON 4 MG/1
8 TABLET, ORALLY DISINTEGRATING ORAL EVERY 6 HOURS PRN
Qty: 20 TABLET | Refills: 0 | Status: SHIPPED | OUTPATIENT
Start: 2024-02-08

## 2024-02-08 RX ADMIN — HYDROCODONE BITARTRATE AND ACETAMINOPHEN 1 TABLET: 5; 325 TABLET ORAL at 12:02

## 2024-02-08 RX ADMIN — ONDANSETRON 4 MG: 4 TABLET, ORALLY DISINTEGRATING ORAL at 12:02

## 2024-02-08 RX ADMIN — CEPHALEXIN 500 MG: 500 CAPSULE ORAL at 06:02

## 2024-02-08 RX ADMIN — KETOROLAC TROMETHAMINE 10 MG: 10 TABLET, FILM COATED ORAL at 01:02

## 2024-02-08 RX ADMIN — CEPHALEXIN 500 MG: 500 CAPSULE ORAL at 01:02

## 2024-02-08 RX ADMIN — OXYBUTYNIN CHLORIDE 5 MG: 5 TABLET ORAL at 01:02

## 2024-02-08 NOTE — PROGRESS NOTES
[facility] Intensive Care [facility]  Urology  Progress Note    Patient Name: Hardik Jimenez  MRN: [id number]  Admission Date: 2/7/2024  Hospital Length of Stay: 0 days  Code Status: Full Code   Attending Provider: Skye Chew MD   Primary Care Physician: Kelvin Pablo DO    Subjective:     HPI:  Patient is a 47 yo M with PMH of nephrolithiasis, sciatica, asthma who returns to the ED with complaints of left flank pain, nausea, and vomiting. Patient was seen two days ago for the same complaint and opted for trial of passage.  Patient reports passing a kidney stone many years ago.  He denies fevers, chills, difficulty urinating, hematuria, or dysuria.       CT RSS obtained 2/4/24 in the ED shows a 7mm left proximal ureteral stone with mild proximal hydronephrosis. Additional left lower pole non-obstructing stone. There are no right ureteral stones or right hydronephrosis. Bladder and prostate unremarkable. WBC 7. Cr 1.4 (baseline around 1.3-1.5). Clean catch UA nitrite negative. Urine culture from 2/4 no growth.     Interval History: Patient remained overnight for observation after cystoscopy with stent placement yesterday due to left testicular pain. Scrotal ultrasound with no acute findings. This am, pain is resolved. He reports some nausea, no emesis. Tolerating PO liquid intake. Voiding without difficulty, some hematuria.      Objective:     Temp:  [97.6 °F (36.4 °C)-99 °F (37.2 °C)] 99 °F (37.2 °C)  Pulse:  [46-66] 66  Resp:  [14-36] 34  SpO2:  [92 %-100 %] 98 %  BP: ()/(55-93) 93/55     Body mass index is 27.88 kg/m².           Drains       None                    Physical Exam  Vitals reviewed.   Constitutional:       General: He is not in acute distress.     Appearance: He is not diaphoretic.   HENT:      Head: Normocephalic and atraumatic.      Nose: Nose normal.   Eyes:      Conjunctiva/sclera: Conjunctivae normal.   Cardiovascular:      Rate and Rhythm: Normal rate.   Pulmonary:       Effort: Pulmonary effort is normal. No respiratory distress.   Abdominal:      General: There is no distension.   Genitourinary:     Comments: Scrotum showed no rashes or lesions. Testicles showed no masses or tenderness.  Epididymis showed no masses or tenderness.  Penis was un-circumcised. No meatal stenosis. No penile discharge.  No inguinal hernias.  No inguinal lymphadenopathy.     Musculoskeletal:         General: Normal range of motion.      Cervical back: Normal range of motion.   Skin:     General: Skin is dry.   Neurological:      Mental Status: He is alert.   Psychiatric:         Behavior: Behavior normal.         Thought Content: Thought content normal.           Significant Labs:    BMP:  Recent Labs   Lab 02/04/24  1635 02/05/24  0646 02/07/24  0859    138 136   K 4.4 4.0 4.3    112* 107   CO2 17* 21* 24   BUN 14 13 15   CREATININE 1.5* 1.3 1.4   CALCIUM 9.2 8.1* 9.3       CBC:   Recent Labs   Lab 02/04/24  1635 02/05/24  0646 02/07/24  0859   WBC 5.94 7.40 7.42   HGB 14.0 12.3* 13.1*   HCT 38.8* 34.8* 36.9*    191 211       All pertinent labs results from the past 24 hours have been reviewed.    Significant Imaging:  All pertinent imaging results/findings from the past 24 hours have been reviewed.                  Assessment/Plan:     * Left nephrolithiasis  47 yo M with 7mm left proximal ureteral stone. He has failed trial of passage.    - s/p cystoscopy with ureteral stent placement yesterday.  - Remained overnight due to testicular pain - workup and exam benign.  - Ok for discharge.  - F/u as scheduled.  - Patient understands that the stent is not meant to be permanent, and if left in place for too long it can cause infection, renal failure, death, etc.     Left testicular pain   - CHRISTIAN - no acute findings.   - Pain is resolved.        VTE Risk Mitigation (From admission, onward)           Ordered     IP VTE LOW RISK PATIENT  Once         02/07/24 1116                    Maira  DIANE Barrera MD  Urology  [facility] - Intensive Care ([place])

## 2024-02-08 NOTE — PROGRESS NOTES
CHRISTIAN completed due to pts acute onset L testicular pain. Normal arterial flow bilaterally. No testicular torsion. No obvious epididymo-orchitis. No other significant abnormality seen on initial images available. Awaiting final radiology read.

## 2024-02-08 NOTE — PLAN OF CARE
Patient will discharge home. Patient appointments scheduled and added to AVS. Patient family to arrive around 4pm to transport patient home. All CM needs met.    02/08/24 1302   Final Note   Assessment Type Final Discharge Note   Anticipated Discharge Disposition Home   Hospital Resources/Appts/Education Provided Provided patient/caregiver with written discharge plan information;Appointments scheduled and added to AVS   Post-Acute Status   Discharge Delays None known at this time     [facility] - Intensive Care ([place])  Discharge Final Note    Primary Care Provider: Kelvin Pablo DO    Expected Discharge Date: 2/8/2024    Final Discharge Note (most recent)       Final Note - 02/08/24 1302          Final Note    Assessment Type Final Discharge Note (P)      Anticipated Discharge Disposition Home or Self Care (P)      Hospital Resources/Appts/Education Provided Provided patient/caregiver with written discharge plan information;Appointments scheduled and added to AVS (P)         Post-Acute Status    Discharge Delays None known at this time (P)                      Important Message from Medicare             Contact Info       Lesley Villanueva NP   Specialty: Urology    [address] [place] [address]   Phone: [phone]       Next Steps: Follow up on 2/12/2024    Instructions: Follow up with imaging prior

## 2024-02-08 NOTE — SUBJECTIVE & OBJECTIVE
Past Medical History:   Diagnosis Date    Arthritis     Asthma     Renal disorder     Kidney stones    Sciatica        Past Surgical History:   Procedure Laterality Date    COLONOSCOPY N/A 3/27/2018    Procedure: COLONOSCOPY;  Surgeon: Sara Rios MD;  Location: [facility];  Service: Endoscopy;  Laterality: N/A;       Review of patient's allergies indicates:  No Known Allergies    Family History       Problem Relation (Age of Onset)    No Known Problems Mother, Father            Tobacco Use    Smoking status: Never    Smokeless tobacco: Never   Substance and Sexual Activity    Alcohol use: Yes     Comment: occasionally.    Drug use: Yes     Types: Marijuana    Sexual activity: Yes     Partners: Female       Review of Systems   Constitutional:  Negative for fever.   Respiratory:  Negative for shortness of breath.    Gastrointestinal:  Positive for abdominal pain, constipation, nausea and vomiting.   Genitourinary:  Positive for flank pain, frequency and testicular pain.   Neurological:  Negative for dizziness.     Objective:     Temp:  [97.6 °F (36.4 °C)-98.2 °F (36.8 °C)] 98.1 °F (36.7 °C)  Pulse:  [53-63] 61  Resp:  [16-20] 16  SpO2:  [92 %-100 %] 92 %  BP: (135-163)/(70-93) 146/70     Body mass index is 27.6 kg/m².    I/O last 3 completed shifts:  In: 1700 [IV Piggyback:1700]  Out: 150 [Urine:150]       Drains       None                     Physical Exam  Vitals reviewed.   Constitutional:       General: He is not in acute distress.     Appearance: He is well-developed. He is ill-appearing. He is not diaphoretic.   HENT:      Head: Normocephalic and atraumatic.   Eyes:      General: No scleral icterus.        Right eye: No discharge.         Left eye: No discharge.   Pulmonary:      Effort: Pulmonary effort is normal. No respiratory distress.   Abdominal:      General: Bowel sounds are normal. There is no distension.      Palpations: Abdomen is soft.      Tenderness: There is abdominal tenderness.  "There is left CVA tenderness. There is no right CVA tenderness, guarding or rebound.   Musculoskeletal:         General: Normal range of motion.      Cervical back: Normal range of motion and neck supple.   Skin:     General: Skin is warm and dry.      Findings: No erythema.   Neurological:      Mental Status: He is alert and oriented to person, place, and time.      Significant Labs:    BMP:  Recent Labs   Lab 02/04/24  1635 02/05/24  0646 02/07/24  0859    138 136   K 4.4 4.0 4.3    112* 107   CO2 17* 21* 24   BUN 14 13 15   CREATININE 1.5* 1.3 1.4   CALCIUM 9.2 8.1* 9.3       CBC:  Recent Labs   Lab 02/04/24  1635 02/05/24  0646 02/07/24  0859   WBC 5.94 7.40 7.42   HGB 14.0 12.3* 13.1*   HCT 38.8* 34.8* 36.9*    191 211       Blood Culture: No results for input(s): "LABBLOO" in the last 168 hours.  Urine Culture:   Recent Labs   Lab 02/04/24 1944   LABURIN No growth     Urine Studies:   Recent Labs   Lab 02/04/24 1944 02/07/24  1037   COLORU Yellow Yellow   APPEARANCEUA Clear Clear   PHUR 6.0 6.0   SPECGRAV 1.020 1.025   PROTEINUA Negative Negative   GLUCUA Negative Negative   KETONESU Negative Negative   BILIRUBINUA Negative Negative   OCCULTUA 3+* Negative   NITRITE Negative Negative   UROBILINOGEN Negative Negative   LEUKOCYTESUR Negative Negative   RBCUA 12*  --    WBCUA 1  --    BACTERIA None  --    SQUAMEPITHEL 1  --        Significant Imaging:  All pertinent imaging results/findings from the past 24 hours have been reviewed.    "

## 2024-02-08 NOTE — ASSESSMENT & PLAN NOTE
47 yo M with 7mm left proximal ureteral stone. He has failed trial of passage.    - s/p cystoscopy with ureteral stent placement yesterday.  - Remained overnight due to testicular pain - workup and exam benign.  - Ok for discharge.  - F/u as scheduled.  - Patient understands that the stent is not meant to be permanent, and if left in place for too long it can cause infection, renal failure, death, etc.

## 2024-02-08 NOTE — PLAN OF CARE
Patient resting well , Oxygen in use at 2 lpm nasal cannula. Prn Therapy not indicated at this time

## 2024-02-08 NOTE — SUBJECTIVE & OBJECTIVE
Interval History: Patient remained overnight for observation after cystoscopy with stent placement yesterday due to left testicular pain. Scrotal ultrasound with no acute findings. This am, pain is resolved. He reports some nausea, no emesis. Tolerating PO liquid intake. Voiding without difficulty, some hematuria.      Objective:     Temp:  [97.6 °F (36.4 °C)-99 °F (37.2 °C)] 99 °F (37.2 °C)  Pulse:  [46-66] 66  Resp:  [14-36] 34  SpO2:  [92 %-100 %] 98 %  BP: ()/(55-93) 93/55     Body mass index is 27.88 kg/m².           Drains       None                    Physical Exam  Vitals reviewed.   Constitutional:       General: He is not in acute distress.     Appearance: He is not diaphoretic.   HENT:      Head: Normocephalic and atraumatic.      Nose: Nose normal.   Eyes:      Conjunctiva/sclera: Conjunctivae normal.   Cardiovascular:      Rate and Rhythm: Normal rate.   Pulmonary:      Effort: Pulmonary effort is normal. No respiratory distress.   Abdominal:      General: There is no distension.   Genitourinary:     Comments: Scrotum showed no rashes or lesions. Testicles showed no masses or tenderness.  Epididymis showed no masses or tenderness.  Penis was un-circumcised. No meatal stenosis. No penile discharge.  No inguinal hernias.  No inguinal lymphadenopathy.     Musculoskeletal:         General: Normal range of motion.      Cervical back: Normal range of motion.   Skin:     General: Skin is dry.   Neurological:      Mental Status: He is alert.   Psychiatric:         Behavior: Behavior normal.         Thought Content: Thought content normal.           Significant Labs:    BMP:  Recent Labs   Lab 02/04/24  1635 02/05/24  0646 02/07/24  0859    138 136   K 4.4 4.0 4.3    112* 107   CO2 17* 21* 24   BUN 14 13 15   CREATININE 1.5* 1.3 1.4   CALCIUM 9.2 8.1* 9.3       CBC:   Recent Labs   Lab 02/04/24  1635 02/05/24  0646 02/07/24  0859   WBC 5.94 7.40 7.42   HGB 14.0 12.3* 13.1*   HCT 38.8* 34.8*  36.9*    191 211       All pertinent labs results from the past 24 hours have been reviewed.    Significant Imaging:  All pertinent imaging results/findings from the past 24 hours have been reviewed.

## 2024-02-08 NOTE — DISCHARGE SUMMARY
[facility] Intensive Care [facility]  Urology  Discharge Summary      Patient Name: Edgardo Jimenez  MRN: [id number]  Admission Date: 2/7/2024  Hospital Length of Stay: 0 days  Discharge Date and Time:  02/08/2024 8:00 AM  Attending Physician: Maira Barrera MD    Discharging Provider: Maira Barrera MD  Primary Care Physician: Kelvin Pablo DO    HPI:   Patient is a 49 yo M with PMH of nephrolithiasis, sciatica, asthma who returns to the ED with complaints of left flank pain, nausea, and vomiting. Patient was seen two days ago for the same complaint and opted for trial of passage.  Patient reports passing a kidney stone many years ago.  He denies fevers, chills, difficulty urinating, hematuria, or dysuria.       CT RSS obtained 2/4/24 in the ED shows a 7mm left proximal ureteral stone with mild proximal hydronephrosis. Additional left lower pole non-obstructing stone. There are no right ureteral stones or right hydronephrosis. Bladder and prostate unremarkable. WBC 7. Cr 1.4 (baseline around 1.3-1.5). Clean catch UA nitrite negative. Urine culture from 2/4 no growth.      Procedure(s) (LRB):  CYSTOSCOPY, WITH URETERAL STENT INSERTION (Left)     Indwelling Lines/Drains at time of discharge:   Lines/Drains/Airways       None                   Hospital Course (synopsis of major diagnoses, care, treatment, and services provided during the course of the hospital stay):    EDGARDO JIMENEZ 48 y.o.male underwent: Procedure(s) (LRB):  CYSTOSCOPY, WITH URETERAL STENT INSERTION (Left). The patient tolerated the procedure well, was transferred to recovery post-op, and then transferred to the floor for continuation of medical care due to left testicular pain. The patient's clinical condition progressively improved. By the time of discharge, he was tolerating a diet without nausea or vomiting, pain was well controlled with oral medications, and he was ambulating without difficulty. Scrotal ultrasound normal, exam  benign. Testicular pain resolved. On POD 1 the patient was discharged to home. The patient will follow up in [facility] next week for surgical planning.      Medications and instructions as below.  For more thorough information, please refer to the hospital record and operative report.    Consults:   Consults (From admission, onward)          Status Ordering Provider     Inpatient consult to Urology  Once        Provider:  (Not yet assigned)    RACHEL Bravo            Significant Diagnostic Studies:     Pending Diagnostic Studies:       None            Final Active Diagnoses:    Diagnosis Date Noted POA    PRINCIPAL PROBLEM:  Left nephrolithiasis [N20.0] 02/26/2018 Yes    Left testicular pain [N50.812] 02/07/2024 Yes      Problems Resolved During this Admission:       Discharged Condition: good    Disposition: Home or Self Care    Follow Up:   Follow-up Information       [facility], NP Follow up on 2/12/2024.    Specialty: Urology  Why: Follow up with imaging prior  Contact information:  [address] [place] [address]  [phone]                           Patient Instructions:   No discharge procedures on file.  Medications:  Reconciled Home Medications:      Medication List        START taking these medications      oxybutynin 5 MG Tab  Commonly known as: DITROPAN  Take 1 tablet (5 mg total) by mouth 3 (three) times daily as needed (bladder spasms).     phenazopyridine 100 MG tablet  Commonly known as: PYRIDIUM  Take 1 tablet (100 mg total) by mouth 3 (three) times daily as needed for Pain.            CHANGE how you take these medications      * ibuprofen 600 MG tablet  Commonly known as: ADVIL,MOTRIN  Take 1 tablet (600 mg total) by mouth every 6 (six) hours as needed for Pain.  What changed: Another medication with the same name was added. Make sure you understand how and when to take each.     * ibuprofen 800 MG tablet  Commonly known as: ADVIL,MOTRIN  Take 1 tablet  (800 mg total) by mouth 3 (three) times daily as needed for Pain.  What changed: You were already taking a medication with the same name, and this prescription was added. Make sure you understand how and when to take each.     * ondansetron 4 MG Tbdl  Commonly known as: ZOFRAN-ODT  Take 1 tablet (4 mg total) by mouth every 8 (eight) hours as needed (for nausea).  What changed: Another medication with the same name was added. Make sure you understand how and when to take each.     * ondansetron 4 MG Tbdl  Commonly known as: ZOFRAN-ODT  Take 2 tablets (8 mg total) by mouth every 6 (six) hours as needed (nausea).  What changed: You were already taking a medication with the same name, and this prescription was added. Make sure you understand how and when to take each.     * tamsulosin 0.4 mg Cap  Commonly known as: FLOMAX  Take 1 capsule (0.4 mg total) by mouth every evening. for 10 days  What changed: Another medication with the same name was added. Make sure you understand how and when to take each.     * tamsulosin 0.4 mg Cap  Commonly known as: FLOMAX  Take 1 capsule (0.4 mg total) by mouth once daily.  What changed: You were already taking a medication with the same name, and this prescription was added. Make sure you understand how and when to take each.           * This list has 6 medication(s) that are the same as other medications prescribed for you. Read the directions carefully, and ask your doctor or other care provider to review them with you.                CONTINUE taking these medications      albuterol 90 mcg/actuation inhaler  Commonly known as: PROVENTIL/VENTOLIN HFA  Inhale 1-2 puffs into the lungs every 6 (six) hours as needed for Wheezing. Rescue     HYDROcodone-acetaminophen 5-325 mg per tablet  Commonly known as: NORCO  Take 1 tablet by mouth every 6 (six) hours as needed.     ketorolac 10 mg tablet  Commonly known as: TORADOL  Take 1 tablet (10 mg total) by mouth every 6 (six) hours. for 3  days     oxyCODONE-acetaminophen 5-325 mg per tablet  Commonly known as: PERCOCET  Take 1 tablet by mouth every 4 (four) hours as needed for Pain.     predniSONE 10 MG tablet  Commonly known as: DELTASONE  Take 1 tablet (10 mg total) by mouth 2 (two) times daily.     promethazine 6.25 mg/5 mL syrup  Commonly known as: PHENERGAN  Take 20 mLs (25 mg total) by mouth every 6 (six) hours as needed for Nausea.            ASK your doctor about these medications      cyclobenzaprine 10 MG tablet  Commonly known as: FLEXERIL  Take 1 tablet (10 mg total) by mouth 3 (three) times daily as needed for Muscle spasms.     gabapentin 100 MG capsule  Commonly known as: NEURONTIN  1@ HS X 3 days then one 3X/day X 3 days then two 3X/day X3 days then 3caps 3X/day after.Stay@ most comfortable dose     meloxicam 15 MG tablet  Commonly known as: MOBIC  Take 1 tablet (15 mg total) by mouth once daily.              Time spent on the discharge of patient: 15 minutes    Maira Barrera MD  Urology  [facility] ([place])

## 2024-02-08 NOTE — ASSESSMENT & PLAN NOTE
49 yo M with 7mm left proximal ureteral stone. He has failed trial of passage.    - s/p cystoscopy with ureteral stent placement today.  - Okay to eat  - IVF  - Consent  - Pain control  - Nausea control  - Flomax  - Strain all urine   Patient understands that the stent is not meant to be permanent, and if left in place for too long it can cause infection, renal failure, death, etc.

## 2024-02-08 NOTE — OR NURSING
Report given to Dick ARNOLD, Transported patient via stretcher With RN's x 2 to ICU without incident

## 2024-02-08 NOTE — H&P
[facility])  Urology  History & Physical    Patient Name: Hardik Jimenez  MRN: [id number]  Admission Date: 2/7/2024  Code Status: Full Code   Attending Provider: No att. providers found   Primary Care Physician: Kelvin Pablo DO  Principal Problem:Left nephrolithiasis    Subjective:     HPI:  Patient is a 49 yo M with PMH of nephrolithiasis, sciatica, asthma who returns to the ED with complaints of left flank pain, nausea, and vomiting. Patient was seen two days ago for the same complaint and opted for trial of passage.  Patient reports passing a kidney stone many years ago.  He denies fevers, chills, difficulty urinating, hematuria, or dysuria.       CT RSS obtained 2/4/24 in the ED shows a 7mm left proximal ureteral stone with mild proximal hydronephrosis. Additional left lower pole non-obstructing stone. There are no right ureteral stones or right hydronephrosis. Bladder and prostate unremarkable. WBC 7. Cr 1.4 (baseline around 1.3-1.5). Clean catch UA nitrite negative. Urine culture from 2/4 no growth.     Past Medical History:   Diagnosis Date    Arthritis     Asthma     Renal disorder     Kidney stones    Sciatica        Past Surgical History:   Procedure Laterality Date    COLONOSCOPY N/A 3/27/2018    Procedure: COLONOSCOPY;  Surgeon: Sara Rios MD;  Location: [facility];  Service: Endoscopy;  Laterality: N/A;       Review of patient's allergies indicates:  No Known Allergies    Family History       Problem Relation (Age of Onset)    No Known Problems Mother, Father            Tobacco Use    Smoking status: Never    Smokeless tobacco: Never   Substance and Sexual Activity    Alcohol use: Yes     Comment: occasionally.    Drug use: Yes     Types: Marijuana    Sexual activity: Yes     Partners: Female       Review of Systems   Constitutional:  Negative for fever.   Respiratory:  Negative for shortness of breath.    Gastrointestinal:  Positive for abdominal pain, constipation, nausea  "and vomiting.   Genitourinary:  Positive for flank pain, frequency and testicular pain.   Neurological:  Negative for dizziness.     Objective:     Temp:  [97.6 °F (36.4 °C)-98.2 °F (36.8 °C)] 98.1 °F (36.7 °C)  Pulse:  [53-63] 61  Resp:  [16-20] 16  SpO2:  [92 %-100 %] 92 %  BP: (135-163)/(70-93) 146/70     Body mass index is 27.6 kg/m².    I/O last 3 completed shifts:  In: 1700 [IV Piggyback:1700]  Out: 150 [Urine:150]       Drains       None                     Physical Exam  Vitals reviewed.   Constitutional:       General: He is not in acute distress.     Appearance: He is well-developed. He is ill-appearing. He is not diaphoretic.   HENT:      Head: Normocephalic and atraumatic.   Eyes:      General: No scleral icterus.        Right eye: No discharge.         Left eye: No discharge.   Pulmonary:      Effort: Pulmonary effort is normal. No respiratory distress.   Abdominal:      General: Bowel sounds are normal. There is no distension.      Palpations: Abdomen is soft.      Tenderness: There is abdominal tenderness. There is left CVA tenderness. There is no right CVA tenderness, guarding or rebound.   Musculoskeletal:         General: Normal range of motion.      Cervical back: Normal range of motion and neck supple.   Skin:     General: Skin is warm and dry.      Findings: No erythema.   Neurological:      Mental Status: He is alert and oriented to person, place, and time.      Significant Labs:    BMP:  Recent Labs   Lab 02/04/24  1635 02/05/24  0646 02/07/24  0859    138 136   K 4.4 4.0 4.3    112* 107   CO2 17* 21* 24   BUN 14 13 15   CREATININE 1.5* 1.3 1.4   CALCIUM 9.2 8.1* 9.3       CBC:  Recent Labs   Lab 02/04/24  1635 02/05/24  0646 02/07/24  0859   WBC 5.94 7.40 7.42   HGB 14.0 12.3* 13.1*   HCT 38.8* 34.8* 36.9*    191 211       Blood Culture: No results for input(s): "LABBLOO" in the last 168 hours.  Urine Culture:   Recent Labs   Lab 02/04/24 1944   LABURIN No growth     Urine " Studies:   Recent Labs   Lab 02/04/24 1944 02/07/24  1037   COLORU Yellow Yellow   APPEARANCEUA Clear Clear   PHUR 6.0 6.0   SPECGRAV 1.020 1.025   PROTEINUA Negative Negative   GLUCUA Negative Negative   KETONESU Negative Negative   BILIRUBINUA Negative Negative   OCCULTUA 3+* Negative   NITRITE Negative Negative   UROBILINOGEN Negative Negative   LEUKOCYTESUR Negative Negative   RBCUA 12*  --    WBCUA 1  --    BACTERIA None  --    SQUAMEPITHEL 1  --        Significant Imaging:  All pertinent imaging results/findings from the past 24 hours have been reviewed.       Assessment and Plan:     * Left nephrolithiasis  47 yo M with 7mm left proximal ureteral stone. He has failed trial of passage.    - s/p cystoscopy with ureteral stent placement today.  - Okay to eat  - IVF  - Consent  - Pain control  - Nausea control  - Flomax  - Strain all urine   Patient understands that the stent is not meant to be permanent, and if left in place for too long it can cause infection, renal failure, death, etc.     Left testicular pain   - Will obtain CHRISTIAN        VTE Risk Mitigation (From admission, onward)           Ordered     IP VTE LOW RISK PATIENT  Once         02/07/24 1116                    Skye Chew MD  Urology  [facility] - Surgery ([place])

## 2024-02-08 NOTE — EICU
New Patient Evaluation    HPI:  48 M history of nephrolithiasis, sciatica, asthma, presented with left flank pain, nausea and vomiting.CTT RSS 2/4 revealed a 7 mm proximal ureteral stone with mild proximal hydronephrosis. Also with left lower pole non-obstructing stone. Underwent cystoscopy with ureteral stent placement 2/7. He had acute onset uncontrolled left testicular pain and was admitted. Ultrasound done without acute findings.    Camera Assessment:  /73  HR 46  O2 99%  Seen resting in bed and appears comfortable    Data:  Na 136, K 4.3, Cl 107, CO2 24, BUN 15, creatinine 1.4  WBC 7.42, K 13/37, platelets 211                                                                                                                                                                                                                                                                                                                                                                                                                Assessment and Plans:  Nephrolithiasis with hydronephrosis s/p stent placement. Ongoing hydration with NS 75 cc/hr  Testicular pain, unclear etiology. Pain appears to be controlled at this time.

## 2024-02-09 ENCOUNTER — HOSPITAL ENCOUNTER (OUTPATIENT)
Dept: RADIOLOGY | Facility: OTHER | Age: 49
Discharge: HOME | End: 2024-02-09
Attending: INTERNAL MEDICINE
Payer: COMMERCIAL

## 2024-02-09 DIAGNOSIS — N20.0 LEFT NEPHROLITHIASIS: ICD-10-CM

## 2024-02-09 PROCEDURE — 74018 RADEX ABDOMEN 1 VIEW: CPT | Mod: TC,FY

## 2024-02-09 PROCEDURE — 74018 RADEX ABDOMEN 1 VIEW: CPT | Mod: 26,,, | Performed by: RADIOLOGY

## 2024-02-10 ENCOUNTER — NURSE TRIAGE (OUTPATIENT)
Dept: ADMINISTRATIVE | Facility: CLINIC | Age: 49
End: 2024-02-10
Payer: COMMERCIAL

## 2024-02-10 ENCOUNTER — HOSPITAL ENCOUNTER (EMERGENCY)
Facility: OTHER | Age: 49
Discharge: HOME | End: 2024-02-10
Attending: EMERGENCY MEDICINE
Payer: COMMERCIAL

## 2024-02-10 VITALS
RESPIRATION RATE: 18 BRPM | WEIGHT: 215 LBS | DIASTOLIC BLOOD PRESSURE: 92 MMHG | BODY MASS INDEX: 28.49 KG/M2 | OXYGEN SATURATION: 99 % | HEIGHT: 73 IN | HEART RATE: 60 BPM | SYSTOLIC BLOOD PRESSURE: 167 MMHG | TEMPERATURE: 99 F

## 2024-02-10 DIAGNOSIS — N20.0 NEPHROLITHIASIS: Primary | ICD-10-CM

## 2024-02-10 DIAGNOSIS — N23 RENAL COLIC ON LEFT SIDE: ICD-10-CM

## 2024-02-10 LAB
ANION GAP SERPL CALC-SCNC: 11 MMOL/L (ref 8–16)
BACTERIA #/AREA URNS HPF: ABNORMAL /HPF
BASOPHILS # BLD AUTO: 0.03 K/UL (ref 0–0.2)
BASOPHILS NFR BLD: 0.4 % (ref 0–1.9)
BILIRUB UR QL STRIP: NEGATIVE
BUN SERPL-MCNC: 10 MG/DL (ref 6–20)
CALCIUM SERPL-MCNC: 9.5 MG/DL (ref 8.7–10.5)
CHLORIDE SERPL-SCNC: 103 MMOL/L (ref 95–110)
CLARITY UR: ABNORMAL
CO2 SERPL-SCNC: 20 MMOL/L (ref 23–29)
COLOR UR: ABNORMAL
CREAT SERPL-MCNC: 1.4 MG/DL (ref 0.5–1.4)
DIFFERENTIAL METHOD BLD: ABNORMAL
EOSINOPHIL # BLD AUTO: 0 K/UL (ref 0–0.5)
EOSINOPHIL NFR BLD: 0.3 % (ref 0–8)
ERYTHROCYTE [DISTWIDTH] IN BLOOD BY AUTOMATED COUNT: 12.7 % (ref 11.5–14.5)
EST. GFR  (NO RACE VARIABLE): >60 ML/MIN/1.73 M^2
GLUCOSE SERPL-MCNC: 92 MG/DL (ref 70–110)
GLUCOSE UR QL STRIP: NEGATIVE
HCT VFR BLD AUTO: 38.8 % (ref 40–54)
HGB BLD-MCNC: 14.1 G/DL (ref 14–18)
HGB UR QL STRIP: ABNORMAL
HYALINE CASTS #/AREA URNS LPF: 1 /LPF
IMM GRANULOCYTES # BLD AUTO: 0.03 K/UL (ref 0–0.04)
IMM GRANULOCYTES NFR BLD AUTO: 0.4 % (ref 0–0.5)
KETONES UR QL STRIP: ABNORMAL
LEUKOCYTE ESTERASE UR QL STRIP: ABNORMAL
LYMPHOCYTES # BLD AUTO: 1.5 K/UL (ref 1–4.8)
LYMPHOCYTES NFR BLD: 20.7 % (ref 18–48)
MCH RBC QN AUTO: 29.5 PG (ref 27–31)
MCHC RBC AUTO-ENTMCNC: 36.3 G/DL (ref 32–36)
MCV RBC AUTO: 81 FL (ref 82–98)
MICROSCOPIC COMMENT: ABNORMAL
MONOCYTES # BLD AUTO: 0.9 K/UL (ref 0.3–1)
MONOCYTES NFR BLD: 12.4 % (ref 4–15)
NEUTROPHILS # BLD AUTO: 4.8 K/UL (ref 1.8–7.7)
NEUTROPHILS NFR BLD: 65.8 % (ref 38–73)
NITRITE UR QL STRIP: NEGATIVE
NRBC BLD-RTO: 0 /100 WBC
PH UR STRIP: 7 [PH] (ref 5–8)
PLATELET # BLD AUTO: 219 K/UL (ref 150–450)
PMV BLD AUTO: 9 FL (ref 9.2–12.9)
POTASSIUM SERPL-SCNC: 3.8 MMOL/L (ref 3.5–5.1)
PROT UR QL STRIP: ABNORMAL
RBC # BLD AUTO: 4.78 M/UL (ref 4.6–6.2)
RBC #/AREA URNS HPF: >100 /HPF (ref 0–4)
SODIUM SERPL-SCNC: 134 MMOL/L (ref 136–145)
SP GR UR STRIP: 1.01 (ref 1–1.03)
URN SPEC COLLECT METH UR: ABNORMAL
UROBILINOGEN UR STRIP-ACNC: NEGATIVE EU/DL
WBC # BLD AUTO: 7.23 K/UL (ref 3.9–12.7)
WBC #/AREA URNS HPF: 67 /HPF (ref 0–5)
WBC CLUMPS URNS QL MICRO: ABNORMAL

## 2024-02-10 PROCEDURE — 80048 BASIC METABOLIC PNL TOTAL CA: CPT | Performed by: NURSE PRACTITIONER

## 2024-02-10 PROCEDURE — 99284 EMERGENCY DEPT VISIT MOD MDM: CPT

## 2024-02-10 PROCEDURE — 87077 CULTURE AEROBIC IDENTIFY: CPT | Performed by: EMERGENCY MEDICINE

## 2024-02-10 PROCEDURE — 87186 SC STD MICRODIL/AGAR DIL: CPT | Performed by: EMERGENCY MEDICINE

## 2024-02-10 PROCEDURE — 63600175 PHARM REV CODE 636 W HCPCS: Performed by: NURSE PRACTITIONER

## 2024-02-10 PROCEDURE — 25000003 PHARM REV CODE 250: Performed by: NURSE PRACTITIONER

## 2024-02-10 PROCEDURE — 81000 URINALYSIS NONAUTO W/SCOPE: CPT | Performed by: EMERGENCY MEDICINE

## 2024-02-10 PROCEDURE — 96375 TX/PRO/DX INJ NEW DRUG ADDON: CPT

## 2024-02-10 PROCEDURE — 96374 THER/PROPH/DIAG INJ IV PUSH: CPT

## 2024-02-10 PROCEDURE — 85025 COMPLETE CBC W/AUTO DIFF WBC: CPT | Performed by: NURSE PRACTITIONER

## 2024-02-10 PROCEDURE — 87088 URINE BACTERIA CULTURE: CPT | Performed by: EMERGENCY MEDICINE

## 2024-02-10 PROCEDURE — 96361 HYDRATE IV INFUSION ADD-ON: CPT

## 2024-02-10 PROCEDURE — 87086 URINE CULTURE/COLONY COUNT: CPT | Performed by: EMERGENCY MEDICINE

## 2024-02-10 PROCEDURE — 25000003 PHARM REV CODE 250: Performed by: EMERGENCY MEDICINE

## 2024-02-10 PROCEDURE — 63600175 PHARM REV CODE 636 W HCPCS: Performed by: EMERGENCY MEDICINE

## 2024-02-10 RX ORDER — ONDANSETRON HYDROCHLORIDE 2 MG/ML
4 INJECTION, SOLUTION INTRAVENOUS
Status: COMPLETED | OUTPATIENT
Start: 2024-02-10 | End: 2024-02-10

## 2024-02-10 RX ORDER — HYDROMORPHONE HYDROCHLORIDE 1 MG/ML
1 INJECTION, SOLUTION INTRAMUSCULAR; INTRAVENOUS; SUBCUTANEOUS
Status: COMPLETED | OUTPATIENT
Start: 2024-02-10 | End: 2024-02-10

## 2024-02-10 RX ORDER — SODIUM CHLORIDE 9 MG/ML
1000 INJECTION, SOLUTION INTRAVENOUS
Status: COMPLETED | OUTPATIENT
Start: 2024-02-10 | End: 2024-02-10

## 2024-02-10 RX ORDER — KETOROLAC TROMETHAMINE 30 MG/ML
15 INJECTION, SOLUTION INTRAMUSCULAR; INTRAVENOUS
Status: COMPLETED | OUTPATIENT
Start: 2024-02-10 | End: 2024-02-10

## 2024-02-10 RX ADMIN — SODIUM CHLORIDE 1000 ML: 9 INJECTION, SOLUTION INTRAVENOUS at 09:02

## 2024-02-10 RX ADMIN — KETOROLAC TROMETHAMINE 15 MG: 30 INJECTION INTRAMUSCULAR; INTRAVENOUS at 09:02

## 2024-02-10 RX ADMIN — SODIUM CHLORIDE 1000 ML: 0.9 INJECTION, SOLUTION INTRAVENOUS at 10:02

## 2024-02-10 RX ADMIN — ONDANSETRON 4 MG: 2 INJECTION INTRAMUSCULAR; INTRAVENOUS at 09:02

## 2024-02-10 RX ADMIN — HYDROMORPHONE HYDROCHLORIDE 1 MG: 1 INJECTION, SOLUTION INTRAMUSCULAR; INTRAVENOUS; SUBCUTANEOUS at 10:02

## 2024-02-11 NOTE — FIRST PROVIDER EVALUATION
Emergency Department TeleTriage Encounter Note      CHIEF COMPLAINT    Chief Complaint   Patient presents with    Abdominal Pain     Diffuse abd pain, x 6 days, been here recently for same       VITAL SIGNS   Initial Vitals [02/10/24 1854]   BP Pulse Resp Temp SpO2   (!) 142/84 66 15 98.9 °F (37.2 °C) 98 %      MAP       --            ALLERGIES    Review of patient's allergies indicates:  No Known Allergies    PROVIDER TRIAGE NOTE  This is a teletriage evaluation of a 48 y.o. male presenting to the ED complaining of abd pain. Pt has known kidney stones and is scheduled for lithotripsy. Reports uncontrolled pain and inability to tolerate PO. States he is also constipated.     Pt is alert, sitting upright.     Initial orders will be placed and care will be transferred to an alternate provider when patient is roomed for a full evaluation. Any additional orders and the final disposition will be determined by that provider.         ORDERS  Labs Reviewed - No data to display    ED Orders (720h ago, onward)      Start Ordered     Status Ordering Provider    02/10/24 1915 02/10/24 1901  sodium chloride 0.9% bolus 1,000 mL 1,000 mL  ED 1 Time         Ordered SCARLET GARCIA N.    02/10/24 1915 02/10/24 1901  ondansetron injection 4 mg  ED 1 Time         Ordered SCARLET GARCIA N.    02/10/24 1915 02/10/24 1901  ketorolac injection 15 mg  ED 1 Time         Ordered SCARLET GARCIA N.    02/10/24 1902 02/10/24 1901  Basic metabolic panel  STAT         Ordered SCARLET GARCIA N.    02/10/24 1901 02/10/24 1901  CBC auto differential  STAT         Ordered SCARLET GARCIA N.    02/10/24 1901 02/10/24 1901  Insert Saline lock IV  Once         Ordered SCARLET GARCIA              Virtual Visit Note: The provider triage portion of this emergency department evaluation and documentation was performed via [employer], a HIPAA-compliant telemedicine application, in concert with a  tele-presenter in the room. A face to face patient evaluation with one of my colleagues will occur once the patient is placed in an emergency department room.      DISCLAIMER: This note was prepared with [employer] voice recognition transcription software. Garbled syntax, mangled pronouns, and other bizarre constructions may be attributed to that software system.

## 2024-02-11 NOTE — ED NOTES
Patient states he had a stent placed for a kidney stone on 2/7/2024, pt states that he has ABD pain,  N&V and back pain (flank), pt states he just has not felt well, pt states he started to feel worse on yesterday.

## 2024-02-11 NOTE — ED PROVIDER NOTES
Encounter Date: 2/10/2024       History     Chief Complaint   Patient presents with    Abdominal Pain     Diffuse abd pain, x 6 days, been here recently for same     48-year-old male with history of asthma, sciatica, and nephrolithiasis (6 mm stone at the proximal left ureter) status post cystoscopy and ureteral stent placement on 2/7 presents complaining of generalized abdominal pain which she describes as a burning sensation as well as left flank pain.  He states that this pain is similar to the pain he experienced during his previous 2 ED visits.  He also reports nausea and vomiting as well as chills but only in his setting of severe pain.  He also reports urinary frequency and hematuria.  Patient states that he had 1 good day at home since being discharged but the pain recurred today.  He did take Zofran but did not take any pain medication at home.  He denies any other complaints at this time.  He was scheduled for an ESWL on 02/19.      Review of patient's allergies indicates:  No Known Allergies  Past Medical History:   Diagnosis Date    Arthritis     Asthma     Renal disorder     Kidney stones    Sciatica      Past Surgical History:   Procedure Laterality Date    COLONOSCOPY N/A 3/27/2018    Procedure: COLONOSCOPY;  Surgeon: Sara Rios MD;  Location: [facility];  Service: Endoscopy;  Laterality: N/A;    CYSTOSCOPY W/ URETERAL STENT PLACEMENT Left 2/7/2024    Procedure: CYSTOSCOPY, WITH URETERAL STENT INSERTION;  Surgeon: Skye Chew MD;  Location: [facility] OR;  Service: Urology;  Laterality: Left;     Family History   Problem Relation Age of Onset    No Known Problems Mother     No Known Problems Father      Social History     Tobacco Use    Smoking status: Never    Smokeless tobacco: Never   Substance Use Topics    Alcohol use: Yes     Comment: occasionally.    Drug use: Yes     Types: Marijuana     Review of Systems    Physical Exam     Initial Vitals [02/10/24 1854]   BP Pulse Resp Temp SpO2    (!) 142/84 66 15 98.9 °F (37.2 °C) 98 %      MAP       --         Physical Exam    Nursing note and vitals reviewed.  Constitutional: He appears well-developed and well-nourished. He is not diaphoretic. No distress.   HENT:   Head: Normocephalic and atraumatic.   Right Ear: External ear normal.   Left Ear: External ear normal.   Nose: Nose normal.   Eyes: Conjunctivae and EOM are normal. Right eye exhibits no discharge. Left eye exhibits no discharge.   Cardiovascular:  Normal rate, regular rhythm and normal heart sounds.     Exam reveals no friction rub.       No murmur heard.  Pulmonary/Chest: Breath sounds normal. No respiratory distress. He has no wheezes. He has no rhonchi. He has no rales.   Abdominal: Abdomen is soft. He exhibits no distension. There is no abdominal tenderness. There is no rebound and no guarding.   Musculoskeletal:      Comments: No CVA tenderness to palpation     Neurological: He is alert and oriented to person, place, and time. GCS score is 15. GCS eye subscore is 4. GCS verbal subscore is 5. GCS motor subscore is 6.         ED Course   Procedures  Labs Reviewed   CBC W/ AUTO DIFFERENTIAL - Abnormal; Notable for the following components:       Result Value    Hematocrit 38.8 (*)     MCV 81 (*)     MCHC 36.3 (*)     MPV 9.0 (*)     All other components within normal limits   BASIC METABOLIC PANEL - Abnormal; Notable for the following components:    Sodium 134 (*)     CO2 20 (*)     All other components within normal limits   URINALYSIS, REFLEX TO URINE CULTURE - Abnormal; Notable for the following components:    Color, UA Orange (*)     Appearance, UA Hazy (*)     Protein, UA 1+ (*)     Ketones, UA 1+ (*)     Occult Blood UA 3+ (*)     Leukocytes, UA 3+ (*)     All other components within normal limits    Narrative:     Specimen Source->Urine   URINALYSIS MICROSCOPIC - Abnormal; Notable for the following components:    RBC, UA >100 (*)     WBC, UA 67 (*)     WBC Clumps, UA Occasional (*)      All other components within normal limits    Narrative:     Specimen Source->Urine   CULTURE, URINE          Imaging Results    None          Medications   sodium chloride 0.9% bolus 1,000 mL 1,000 mL (0 mLs Intravenous Stopped 2/10/24 2206)   ondansetron injection 4 mg (4 mg Intravenous Given 2/10/24 2118)   ketorolac injection 15 mg (15 mg Intravenous Given 2/10/24 2118)   HYDROmorphone injection 1 mg (1 mg Intravenous Given 2/10/24 2223)   0.9%  NaCl infusion (1,000 mLs Intravenous New Bag 2/10/24 2223)     Medical Decision Making  Risk  Prescription drug management.               ED Course as of 02/10/24 2330   Sat Feb 10, 2024   2327 UA with white blood cells but rare bacteria no nitrites.  No white count and renal function normal.  Pain relieved after dose of Dilaudid which he states is the only thing that works for him.  I have reiterated return precautions as well as stressed to him the importance of adequate p.o. fluid intake at home and taking his medications as prescribed.  He will be discharged at this time stable condition. [AA]      ED Course User Index  [AA] Ayesha Michele MD                           Clinical Impression:  Final diagnoses:  [N20.0] Nephrolithiasis (Primary)  [N23] Renal colic on left side          ED Disposition Condition    Discharge Stable          ED Prescriptions    None       Follow-up Information       Follow up With Specialties Details Why Contact Info    [facility] - Emergency Dept Emergency Medicine Go to  If symptoms worsen [address] [id number] [phone]    Kelvin Pablo,  Internal Medicine   [address] [address]  [phone]      Urology                 Ayesha Michele MD  02/10/24 2330

## 2024-02-11 NOTE — TELEPHONE ENCOUNTER
Pt's wife calls on behalf of pt who was in the ED several times last week and had a ureteral stent placed d/t a kidney stone. Pt has been taking pain medication as prescribed and is now constipated. Pt's wife reports that pt is nauseous, has vomited, and overall feels uncomfortable. Tried an enema with no sx relief. Has not taken any stool softeners.     Care Advice recommends that pt be seen in the next 3-4 hours. UCC or ED discussed as best care options at this time. Pt and pt's wife verbalize understanding and are instructed to call back with any new/worsening sxs, questions, or concerns.   Reason for Disposition   [1] Rectal pain or fullness from fecal impaction (rectum full of stool) AND [2] NOT better after SITZ bath, suppository or enema    Additional Information   Negative: [1] Vomiting AND [2] contains bile (green color)   Negative: Patient sounds very sick or weak to the triager   Negative: [1] Vomiting AND [2] abdomen looks much more swollen than usual   Negative: [1] Constant abdominal pain AND [2] present > 2 hours    Protocols used: Constipation-A-AH

## 2024-02-11 NOTE — DISCHARGE INSTRUCTIONS
Continue your medications as prescribed.  Seek immediate re-evaluation for any new or worsening symptoms not relieved with the medications you have been prescribed.  Follow up with Urology as scheduled for your lithotripsy.

## 2024-02-12 ENCOUNTER — ANESTHESIA EVENT (OUTPATIENT)
Dept: SURGERY | Facility: OTHER | Age: 49
End: 2024-02-12
Payer: COMMERCIAL

## 2024-02-12 ENCOUNTER — OFFICE VISIT (OUTPATIENT)
Dept: UROLOGY | Facility: CLINIC | Age: 49
End: 2024-02-12
Payer: COMMERCIAL

## 2024-02-12 VITALS
RESPIRATION RATE: 12 BRPM | TEMPERATURE: 97 F | HEART RATE: 72 BPM | HEIGHT: 73 IN | WEIGHT: 215 LBS | DIASTOLIC BLOOD PRESSURE: 77 MMHG | OXYGEN SATURATION: 100 % | SYSTOLIC BLOOD PRESSURE: 119 MMHG | BODY MASS INDEX: 28.49 KG/M2

## 2024-02-12 DIAGNOSIS — N20.0 NEPHROLITHIASIS: ICD-10-CM

## 2024-02-12 DIAGNOSIS — N13.2 URETERAL STONE WITH HYDRONEPHROSIS: Primary | ICD-10-CM

## 2024-02-12 PROCEDURE — 1160F RVW MEDS BY RX/DR IN RCRD: CPT | Mod: CPTII,S$GLB,, | Performed by: NURSE PRACTITIONER

## 2024-02-12 PROCEDURE — 3074F SYST BP LT 130 MM HG: CPT | Mod: CPTII,S$GLB,, | Performed by: NURSE PRACTITIONER

## 2024-02-12 PROCEDURE — 3008F BODY MASS INDEX DOCD: CPT | Mod: CPTII,S$GLB,, | Performed by: NURSE PRACTITIONER

## 2024-02-12 PROCEDURE — 99214 OFFICE O/P EST MOD 30 MIN: CPT | Mod: S$GLB,,, | Performed by: NURSE PRACTITIONER

## 2024-02-12 PROCEDURE — 3078F DIAST BP <80 MM HG: CPT | Mod: CPTII,S$GLB,, | Performed by: NURSE PRACTITIONER

## 2024-02-12 PROCEDURE — 1159F MED LIST DOCD IN RCRD: CPT | Mod: CPTII,S$GLB,, | Performed by: NURSE PRACTITIONER

## 2024-02-12 RX ORDER — SODIUM CHLORIDE 9 MG/ML
INJECTION, SOLUTION INTRAVENOUS CONTINUOUS
Status: CANCELLED | OUTPATIENT
Start: 2024-02-12

## 2024-02-12 RX ORDER — CIPROFLOXACIN 2 MG/ML
400 INJECTION, SOLUTION INTRAVENOUS
Status: CANCELLED | OUTPATIENT
Start: 2024-02-12

## 2024-02-12 NOTE — H&P
"Subjective:      Hardik Jimenez is a 48 y.o. male who presents today regarding his nephrolithiasis. Established patient of Dr. Valencia and Dr. Chew and new to me today.    The patient presented to the ED on 2/5 with L flank pain. CT demonstrated "6-7 mm left proximal ureteral calculus with resultant mild hydronephrosis. 6 mm left intrarenal calculus." He opted to proceed with ESWL with Dr. Valencia on 2/19.    In the interim he returned to the ED on 2/7 with persistent pain. He is now s/p cysto with L ureteral stent placement with Dr. Chew on 2/7.    Returns today to discuss definitive stone management. Reports stent irritation. Denies dysuria, frequency, urgency, and fever/chills.     The following portions of the patient's history were reviewed and updated as appropriate: allergies, current medications, past family history, past medical history, past social history, past surgical history and problem list.    Review of Systems  Constitutional: no fever or chills  ENT: no nasal congestion or sore throat  Respiratory: no cough or shortness of breath  Cardiovascular: no chest pain or palpitations  Gastrointestinal: no nausea or vomiting, tolerating diet  Genitourinary: as per HPI  Hematologic/Lymphatic: no easy bruising or lymphadenopathy  Musculoskeletal: no arthralgias or myalgias  Neurological: no seizures or tremors  Behavioral/Psych: no auditory or visual hallucinations     Objective:   Vitals: /77 (BP Location: Right arm, Patient Position: Sitting, BP Method: Medium (Automatic))   Pulse 72   Temp 97 °F (36.1 °C) (Oral)   Resp 12   Ht 6' 1" (1.854 m)   Wt 97.5 kg (215 lb)   SpO2 100%   BMI 28.37 kg/m²     Physical Exam   General: alert and oriented, no acute distress  Head: normocephalic, atraumatic  Neck: supple, normal ROM  Respiratory: Symmetric expansion, non-labored breathing  Cardiovascular: regular rate and rhythm  Abdomen: soft, non tender, non distended  Genitourinary: deferred  Skin: " "normal coloration and turgor, no rashes, no suspicious skin lesions noted  Neuro: alert and oriented x3, no gross deficits  Psych: normal judgment and insight, normal mood/affect, and non-anxious    Lab Review   Urinalysis demonstrates positive for leukocytes, red blood cells  Lab Results   Component Value Date    WBC 7.23 02/10/2024    HGB 14.1 02/10/2024    HCT 38.8 (L) 02/10/2024    MCV 81 (L) 02/10/2024     02/10/2024     Lab Results   Component Value Date    CREATININE 1.4 02/10/2024    BUN 10 02/10/2024     No results found for: "PSA"  Imaging   (all images personally reviewed; agree with report below)  CTRSS (2/4/24)- "6-7 mm left proximal ureteral calculus with resultant mild hydronephrosis. 6 mm left intrarenal calculus."  Assessment:     1. Ureteral stone with hydronephrosis    2. Nephrolithiasis      Plan:   Diagnoses and all orders for this visit:    Ureteral stone with hydronephrosis    Nephrolithiasis  -     Ambulatory referral/consult to Urology  -     Urine culture    Plan:  --Discussed options including ESWL vs URS. He elects to proceed with L URS with LL of ureteral and renal stone with Dr. Valencia next available. Consent will be signed the day of surgery.  --Urine culture today     "

## 2024-02-12 NOTE — Clinical Note
Please add Mr. Jimenez for ureteroscopy with Dr. Valencia on 2/26 rather than ESWL on 2/19. Dr. Valencia said he had too many cases on 2/19 so please move to 2/26. Consent is on your desk. Thanks

## 2024-02-13 LAB — BACTERIA UR CULT: ABNORMAL

## 2024-02-15 ENCOUNTER — PATIENT MESSAGE (OUTPATIENT)
Dept: PREADMISSION TESTING | Facility: OTHER | Age: 49
End: 2024-02-15
Payer: COMMERCIAL

## 2024-02-19 ENCOUNTER — TELEPHONE (OUTPATIENT)
Dept: UROLOGY | Facility: CLINIC | Age: 49
End: 2024-02-19
Payer: COMMERCIAL

## 2024-02-19 ENCOUNTER — ANESTHESIA (OUTPATIENT)
Dept: SURGERY | Facility: OTHER | Age: 49
End: 2024-02-19
Payer: COMMERCIAL

## 2024-02-20 ENCOUNTER — TELEPHONE (OUTPATIENT)
Dept: UROLOGY | Facility: CLINIC | Age: 49
End: 2024-02-20
Payer: COMMERCIAL

## 2024-02-20 ENCOUNTER — HOSPITAL ENCOUNTER (OUTPATIENT)
Facility: OTHER | Age: 49
Discharge: HOME | End: 2024-02-23
Attending: EMERGENCY MEDICINE | Admitting: HOSPITALIST
Payer: COMMERCIAL

## 2024-02-20 DIAGNOSIS — N20.1 URETEROLITHIASIS: ICD-10-CM

## 2024-02-20 DIAGNOSIS — N20.1 URETERAL CALCULUS, LEFT: Primary | ICD-10-CM

## 2024-02-20 DIAGNOSIS — R11.2 NAUSEA AND VOMITING, UNSPECIFIED VOMITING TYPE: ICD-10-CM

## 2024-02-20 DIAGNOSIS — R07.9 CHEST PAIN: ICD-10-CM

## 2024-02-20 DIAGNOSIS — R10.9 LEFT FLANK PAIN: ICD-10-CM

## 2024-02-20 DIAGNOSIS — N13.9 OBSTRUCTED, UROPATHY: ICD-10-CM

## 2024-02-20 LAB
ALBUMIN SERPL BCP-MCNC: 4 G/DL (ref 3.5–5.2)
ALP SERPL-CCNC: 72 U/L (ref 55–135)
ALT SERPL W/O P-5'-P-CCNC: 23 U/L (ref 10–44)
ANION GAP SERPL CALC-SCNC: 8 MMOL/L (ref 8–16)
AST SERPL-CCNC: 21 U/L (ref 10–40)
BACTERIA #/AREA URNS HPF: ABNORMAL /HPF
BASOPHILS # BLD AUTO: 0.03 K/UL (ref 0–0.2)
BASOPHILS NFR BLD: 0.5 % (ref 0–1.9)
BILIRUB SERPL-MCNC: 0.4 MG/DL (ref 0.1–1)
BILIRUB UR QL STRIP: NEGATIVE
BUN SERPL-MCNC: 14 MG/DL (ref 6–20)
CALCIUM SERPL-MCNC: 9.5 MG/DL (ref 8.7–10.5)
CHLORIDE SERPL-SCNC: 108 MMOL/L (ref 95–110)
CLARITY UR: CLEAR
CO2 SERPL-SCNC: 21 MMOL/L (ref 23–29)
COLOR UR: YELLOW
CREAT SERPL-MCNC: 1.2 MG/DL (ref 0.5–1.4)
DIFFERENTIAL METHOD BLD: ABNORMAL
EOSINOPHIL # BLD AUTO: 0.1 K/UL (ref 0–0.5)
EOSINOPHIL NFR BLD: 1.1 % (ref 0–8)
ERYTHROCYTE [DISTWIDTH] IN BLOOD BY AUTOMATED COUNT: 13.4 % (ref 11.5–14.5)
EST. GFR  (NO RACE VARIABLE): >60 ML/MIN/1.73 M^2
GLUCOSE SERPL-MCNC: 112 MG/DL (ref 70–110)
GLUCOSE UR QL STRIP: NEGATIVE
HCT VFR BLD AUTO: 38.4 % (ref 40–54)
HGB BLD-MCNC: 13.5 G/DL (ref 14–18)
HGB UR QL STRIP: ABNORMAL
HYALINE CASTS #/AREA URNS LPF: 0 /LPF
IMM GRANULOCYTES # BLD AUTO: 0.03 K/UL (ref 0–0.04)
IMM GRANULOCYTES NFR BLD AUTO: 0.5 % (ref 0–0.5)
KETONES UR QL STRIP: NEGATIVE
LEUKOCYTE ESTERASE UR QL STRIP: ABNORMAL
LYMPHOCYTES # BLD AUTO: 1.4 K/UL (ref 1–4.8)
LYMPHOCYTES NFR BLD: 21.1 % (ref 18–48)
MCH RBC QN AUTO: 29.2 PG (ref 27–31)
MCHC RBC AUTO-ENTMCNC: 35.2 G/DL (ref 32–36)
MCV RBC AUTO: 83 FL (ref 82–98)
MICROSCOPIC COMMENT: ABNORMAL
MONOCYTES # BLD AUTO: 0.7 K/UL (ref 0.3–1)
MONOCYTES NFR BLD: 10.2 % (ref 4–15)
NEUTROPHILS # BLD AUTO: 4.4 K/UL (ref 1.8–7.7)
NEUTROPHILS NFR BLD: 66.6 % (ref 38–73)
NITRITE UR QL STRIP: NEGATIVE
NRBC BLD-RTO: 0 /100 WBC
PH UR STRIP: 7 [PH] (ref 5–8)
PLATELET # BLD AUTO: 275 K/UL (ref 150–450)
PMV BLD AUTO: 9 FL (ref 9.2–12.9)
POTASSIUM SERPL-SCNC: 4.4 MMOL/L (ref 3.5–5.1)
PROT SERPL-MCNC: 7.5 G/DL (ref 6–8.4)
PROT UR QL STRIP: ABNORMAL
RBC # BLD AUTO: 4.63 M/UL (ref 4.6–6.2)
RBC #/AREA URNS HPF: >100 /HPF (ref 0–4)
SODIUM SERPL-SCNC: 137 MMOL/L (ref 136–145)
SP GR UR STRIP: 1.02 (ref 1–1.03)
URN SPEC COLLECT METH UR: ABNORMAL
UROBILINOGEN UR STRIP-ACNC: NEGATIVE EU/DL
WBC # BLD AUTO: 6.55 K/UL (ref 3.9–12.7)
WBC #/AREA URNS HPF: 10 /HPF (ref 0–5)

## 2024-02-20 PROCEDURE — 81000 URINALYSIS NONAUTO W/SCOPE: CPT | Performed by: PHYSICIAN ASSISTANT

## 2024-02-20 PROCEDURE — 63600175 PHARM REV CODE 636 W HCPCS: Performed by: PHYSICIAN ASSISTANT

## 2024-02-20 PROCEDURE — 85025 COMPLETE CBC W/AUTO DIFF WBC: CPT | Performed by: PHYSICIAN ASSISTANT

## 2024-02-20 PROCEDURE — G0378 HOSPITAL OBSERVATION PER HR: HCPCS

## 2024-02-20 PROCEDURE — 96361 HYDRATE IV INFUSION ADD-ON: CPT

## 2024-02-20 PROCEDURE — 96375 TX/PRO/DX INJ NEW DRUG ADDON: CPT

## 2024-02-20 PROCEDURE — 25000003 PHARM REV CODE 250: Performed by: PHYSICIAN ASSISTANT

## 2024-02-20 PROCEDURE — 99214 OFFICE O/P EST MOD 30 MIN: CPT | Mod: ,,, | Performed by: UROLOGY

## 2024-02-20 PROCEDURE — 25000003 PHARM REV CODE 250: Performed by: UROLOGY

## 2024-02-20 PROCEDURE — 96365 THER/PROPH/DIAG IV INF INIT: CPT

## 2024-02-20 PROCEDURE — 96376 TX/PRO/DX INJ SAME DRUG ADON: CPT

## 2024-02-20 PROCEDURE — 80053 COMPREHEN METABOLIC PANEL: CPT | Performed by: PHYSICIAN ASSISTANT

## 2024-02-20 PROCEDURE — 63600175 PHARM REV CODE 636 W HCPCS: Performed by: UROLOGY

## 2024-02-20 PROCEDURE — 99285 EMERGENCY DEPT VISIT HI MDM: CPT

## 2024-02-20 RX ORDER — PROCHLORPERAZINE EDISYLATE 5 MG/ML
5 INJECTION INTRAMUSCULAR; INTRAVENOUS EVERY 6 HOURS PRN
Status: DISCONTINUED | OUTPATIENT
Start: 2024-02-20 | End: 2024-02-23 | Stop reason: HOSPADM

## 2024-02-20 RX ORDER — ONDANSETRON HYDROCHLORIDE 2 MG/ML
4 INJECTION, SOLUTION INTRAVENOUS EVERY 6 HOURS PRN
Status: DISCONTINUED | OUTPATIENT
Start: 2024-02-20 | End: 2024-02-23 | Stop reason: HOSPADM

## 2024-02-20 RX ORDER — SIMETHICONE 80 MG
1 TABLET,CHEWABLE ORAL 4 TIMES DAILY PRN
Status: DISCONTINUED | OUTPATIENT
Start: 2024-02-20 | End: 2024-02-23 | Stop reason: HOSPADM

## 2024-02-20 RX ORDER — IBUPROFEN 200 MG
16 TABLET ORAL
Status: DISCONTINUED | OUTPATIENT
Start: 2024-02-20 | End: 2024-02-23 | Stop reason: HOSPADM

## 2024-02-20 RX ORDER — IPRATROPIUM BROMIDE AND ALBUTEROL SULFATE 2.5; .5 MG/3ML; MG/3ML
3 SOLUTION RESPIRATORY (INHALATION) EVERY 4 HOURS PRN
Status: DISCONTINUED | OUTPATIENT
Start: 2024-02-20 | End: 2024-02-23 | Stop reason: HOSPADM

## 2024-02-20 RX ORDER — KETOROLAC TROMETHAMINE 30 MG/ML
10 INJECTION, SOLUTION INTRAMUSCULAR; INTRAVENOUS EVERY 6 HOURS PRN
Status: DISCONTINUED | OUTPATIENT
Start: 2024-02-20 | End: 2024-02-23 | Stop reason: HOSPADM

## 2024-02-20 RX ORDER — ONDANSETRON HYDROCHLORIDE 2 MG/ML
4 INJECTION, SOLUTION INTRAVENOUS
Status: COMPLETED | OUTPATIENT
Start: 2024-02-20 | End: 2024-02-20

## 2024-02-20 RX ORDER — SODIUM CHLORIDE 0.9 % (FLUSH) 0.9 %
5 SYRINGE (ML) INJECTION
Status: DISCONTINUED | OUTPATIENT
Start: 2024-02-20 | End: 2024-02-23 | Stop reason: HOSPADM

## 2024-02-20 RX ORDER — KETOROLAC TROMETHAMINE 15 MG/ML
30 INJECTION, SOLUTION INTRAMUSCULAR; INTRAVENOUS EVERY 6 HOURS PRN
Status: DISCONTINUED | OUTPATIENT
Start: 2024-02-20 | End: 2024-02-20

## 2024-02-20 RX ORDER — KETOROLAC TROMETHAMINE 30 MG/ML
10 INJECTION, SOLUTION INTRAMUSCULAR; INTRAVENOUS
Status: COMPLETED | OUTPATIENT
Start: 2024-02-20 | End: 2024-02-20

## 2024-02-20 RX ORDER — IBUPROFEN 200 MG
24 TABLET ORAL
Status: DISCONTINUED | OUTPATIENT
Start: 2024-02-20 | End: 2024-02-23 | Stop reason: HOSPADM

## 2024-02-20 RX ORDER — POLYETHYLENE GLYCOL 3350 17 G/17G
17 POWDER, FOR SOLUTION ORAL DAILY
Status: DISCONTINUED | OUTPATIENT
Start: 2024-02-21 | End: 2024-02-23 | Stop reason: HOSPADM

## 2024-02-20 RX ORDER — TAMSULOSIN HYDROCHLORIDE 0.4 MG/1
0.4 CAPSULE ORAL DAILY
Status: DISCONTINUED | OUTPATIENT
Start: 2024-02-21 | End: 2024-02-23 | Stop reason: HOSPADM

## 2024-02-20 RX ORDER — NALOXONE HCL 0.4 MG/ML
0.02 VIAL (ML) INJECTION
Status: DISCONTINUED | OUTPATIENT
Start: 2024-02-20 | End: 2024-02-23 | Stop reason: HOSPADM

## 2024-02-20 RX ORDER — TALC
6 POWDER (GRAM) TOPICAL NIGHTLY PRN
Status: DISCONTINUED | OUTPATIENT
Start: 2024-02-20 | End: 2024-02-23 | Stop reason: HOSPADM

## 2024-02-20 RX ORDER — HYDROMORPHONE HYDROCHLORIDE 1 MG/ML
0.5 INJECTION, SOLUTION INTRAMUSCULAR; INTRAVENOUS; SUBCUTANEOUS
Status: DISCONTINUED | OUTPATIENT
Start: 2024-02-20 | End: 2024-02-20

## 2024-02-20 RX ORDER — HYDROMORPHONE HYDROCHLORIDE 1 MG/ML
1 INJECTION, SOLUTION INTRAMUSCULAR; INTRAVENOUS; SUBCUTANEOUS EVERY 6 HOURS PRN
Status: DISCONTINUED | OUTPATIENT
Start: 2024-02-20 | End: 2024-02-23 | Stop reason: HOSPADM

## 2024-02-20 RX ORDER — ALUMINUM HYDROXIDE, MAGNESIUM HYDROXIDE, AND SIMETHICONE 1200; 120; 1200 MG/30ML; MG/30ML; MG/30ML
30 SUSPENSION ORAL 4 TIMES DAILY PRN
Status: DISCONTINUED | OUTPATIENT
Start: 2024-02-20 | End: 2024-02-23 | Stop reason: HOSPADM

## 2024-02-20 RX ORDER — ACETAMINOPHEN 325 MG/1
650 TABLET ORAL EVERY 4 HOURS PRN
Status: DISCONTINUED | OUTPATIENT
Start: 2024-02-20 | End: 2024-02-23 | Stop reason: HOSPADM

## 2024-02-20 RX ORDER — HYDROMORPHONE HYDROCHLORIDE 1 MG/ML
1 INJECTION, SOLUTION INTRAMUSCULAR; INTRAVENOUS; SUBCUTANEOUS EVERY 6 HOURS PRN
Status: DISCONTINUED | OUTPATIENT
Start: 2024-02-20 | End: 2024-02-20

## 2024-02-20 RX ORDER — BISACODYL 10 MG/1
10 SUPPOSITORY RECTAL DAILY PRN
Status: DISCONTINUED | OUTPATIENT
Start: 2024-02-20 | End: 2024-02-23 | Stop reason: HOSPADM

## 2024-02-20 RX ORDER — GLUCAGON 1 MG
1 KIT INJECTION
Status: DISCONTINUED | OUTPATIENT
Start: 2024-02-20 | End: 2024-02-23 | Stop reason: HOSPADM

## 2024-02-20 RX ADMIN — CEFTRIAXONE SODIUM 1 G: 1 INJECTION, POWDER, FOR SOLUTION INTRAMUSCULAR; INTRAVENOUS at 05:02

## 2024-02-20 RX ADMIN — HYDROMORPHONE HYDROCHLORIDE 0.5 MG: 1 INJECTION, SOLUTION INTRAMUSCULAR; INTRAVENOUS; SUBCUTANEOUS at 03:02

## 2024-02-20 RX ADMIN — KETOROLAC TROMETHAMINE 10 MG: 30 INJECTION, SOLUTION INTRAMUSCULAR; INTRAVENOUS at 02:02

## 2024-02-20 RX ADMIN — SODIUM CHLORIDE 1000 ML: 9 INJECTION, SOLUTION INTRAVENOUS at 02:02

## 2024-02-20 RX ADMIN — ONDANSETRON 4 MG: 2 INJECTION INTRAMUSCULAR; INTRAVENOUS at 05:02

## 2024-02-20 RX ADMIN — HYDROMORPHONE HYDROCHLORIDE 1 MG: 1 INJECTION, SOLUTION INTRAMUSCULAR; INTRAVENOUS; SUBCUTANEOUS at 05:02

## 2024-02-20 RX ADMIN — ONDANSETRON 4 MG: 2 INJECTION INTRAMUSCULAR; INTRAVENOUS at 02:02

## 2024-02-20 NOTE — SUBJECTIVE & OBJECTIVE
Past Medical History:   Diagnosis Date    Arthritis     Asthma     Kidney stones     Renal disorder     Kidney stones    Sciatica        Past Surgical History:   Procedure Laterality Date    COLONOSCOPY N/A 3/27/2018    Procedure: COLONOSCOPY;  Surgeon: Sara Rios MD;  Location: [facility];  Service: Endoscopy;  Laterality: N/A;    CYSTOSCOPY W/ URETERAL STENT PLACEMENT Left 2/7/2024    Procedure: CYSTOSCOPY, WITH URETERAL STENT INSERTION;  Surgeon: Skye Chew MD;  Location: [facility];  Service: Urology;  Laterality: Left;       Review of patient's allergies indicates:  No Known Allergies    No current facility-administered medications on file prior to encounter.     Current Outpatient Medications on File Prior to Encounter   Medication Sig    albuterol (PROVENTIL/VENTOLIN HFA) 90 mcg/actuation inhaler Inhale 1-2 puffs into the lungs every 6 (six) hours as needed for Wheezing. Rescue    ondansetron (ZOFRAN-ODT) 4 MG TbDL Take 2 tablets (8 mg total) by mouth every 6 (six) hours as needed (nausea).    oxybutynin (DITROPAN) 5 MG Tab Take 1 tablet (5 mg total) by mouth 3 (three) times daily as needed (bladder spasms).    oxyCODONE-acetaminophen (PERCOCET) 5-325 mg per tablet Take 1 tablet by mouth every 4 (four) hours as needed for Pain.    tamsulosin (FLOMAX) 0.4 mg Cap Take 1 capsule (0.4 mg total) by mouth once daily.    [DISCONTINUED] cyclobenzaprine (FLEXERIL) 10 MG tablet Take 1 tablet (10 mg total) by mouth 3 (three) times daily as needed for Muscle spasms. (Patient not taking: Reported on 10/26/2023)    [DISCONTINUED] gabapentin (NEURONTIN) 100 MG capsule 1@ HS X 3 days then one 3X/day X 3 days then two 3X/day X3 days then 3caps 3X/day after.Stay@ most comfortable dose (Patient not taking: Reported on 10/26/2023)    [DISCONTINUED] HYDROcodone-acetaminophen (NORCO) 5-325 mg per tablet Take 1 tablet by mouth every 6 (six) hours as needed.    [DISCONTINUED] ibuprofen (ADVIL,MOTRIN) 600 MG tablet Take  1 tablet (600 mg total) by mouth every 6 (six) hours as needed for Pain. (Patient not taking: Reported on 2/12/2024)    [DISCONTINUED] meloxicam (MOBIC) 15 MG tablet Take 1 tablet (15 mg total) by mouth once daily. (Patient not taking: Reported on 10/26/2023)    [DISCONTINUED] ondansetron (ZOFRAN-ODT) 4 MG TbDL Take 1 tablet (4 mg total) by mouth every 8 (eight) hours as needed (for nausea).    [DISCONTINUED] predniSONE (DELTASONE) 10 MG tablet Take 1 tablet (10 mg total) by mouth 2 (two) times daily. (Patient not taking: Reported on 2/12/2024)    [DISCONTINUED] promethazine (PHENERGAN) 6.25 mg/5 mL syrup Take 20 mLs (25 mg total) by mouth every 6 (six) hours as needed for Nausea. (Patient not taking: Reported on 2/12/2024)     Family History       Problem Relation (Age of Onset)    No Known Problems Mother, Father          Tobacco Use    Smoking status: Never    Smokeless tobacco: Never   Substance and Sexual Activity    Alcohol use: Yes     Comment: occasionally.    Drug use: Yes     Types: Marijuana, Oxycodone    Sexual activity: Yes     Partners: Female     Review of Systems   Constitutional:  Negative for fever.   Respiratory:  Negative for shortness of breath.    Cardiovascular:  Negative for chest pain.   Gastrointestinal:  Positive for abdominal pain, nausea and vomiting. Negative for diarrhea.   Genitourinary:  Positive for flank pain. Negative for difficulty urinating.   Psychiatric/Behavioral:  Negative for agitation and confusion.      Objective:     Vital Signs (Most Recent):  Temp: 98.1 °F (36.7 °C) (02/20/24 1412)  Pulse: 66 (02/20/24 1412)  Resp: 18 (02/20/24 1703)  BP: 136/84 (02/20/24 1412)  SpO2: 99 % (02/20/24 1412) Vital Signs (24h Range):  Temp:  [98.1 °F (36.7 °C)] 98.1 °F (36.7 °C)  Pulse:  [66] 66  Resp:  [18] 18  SpO2:  [99 %] 99 %  BP: (136)/(84) 136/84     Weight: 97.5 kg (215 lb)  Body mass index is 28.37 kg/m².     Physical Exam  Constitutional:       General: He is in acute distress  (actively vomiting).      Appearance: He is ill-appearing.   HENT:      Head: Normocephalic and atraumatic.   Pulmonary:      Effort: Pulmonary effort is normal. No respiratory distress.   Abdominal:      General: Abdomen is flat. There is no distension.   Genitourinary:     Comments: About 200mL of yellow urine in urinal at bedside  Skin:     General: Skin is warm.   Neurological:      General: No focal deficit present.      Mental Status: He is alert.   Psychiatric:         Mood and Affect: Mood normal.         Behavior: Behavior normal.                Significant Labs: All pertinent labs within the past 24 hours have been reviewed.    Significant Imaging: I have reviewed all pertinent imaging results/findings within the past 24 hours.

## 2024-02-20 NOTE — CONSULTS
[facility], [place], operated by [facility] - Emergency Dept (Observation)  Urology  Consult Note    Patient Name: Hardik Jimenez  MRN: [id number]  Admission Date: 2/20/2024  Hospital Length of Stay: 0 days  Code Status: Prior   Attending Provider: No att. providers found   Consulting Provider: Zach Valencia MD  Primary Care Physician: Kelvin Pablo DO  Principal Problem:<principal problem not specified>    Consults    Subjective:     HPI: 48male  Left prox 7mm stone and 6mm left renal renal stone  Left stent in place      Scheduled for left ureteroscopy as outpatient on Monday    Returns with pain  Admitted to EDOU    He is not sure if he is on antibiotics  Denies fever  Wishes to have procedure moved up    Past Medical History:   Diagnosis Date    Arthritis     Asthma     Kidney stones     Renal disorder     Kidney stones    Sciatica        Past Surgical History:   Procedure Laterality Date    COLONOSCOPY N/A 3/27/2018    Procedure: COLONOSCOPY;  Surgeon: Sara Rios MD;  Location: [facility];  Service: Endoscopy;  Laterality: N/A;    CYSTOSCOPY W/ URETERAL STENT PLACEMENT Left 2/7/2024    Procedure: CYSTOSCOPY, WITH URETERAL STENT INSERTION;  Surgeon: Skye Chew MD;  Location: [facility];  Service: Urology;  Laterality: Left;       Review of patient's allergies indicates:  No Known Allergies    Family History       Problem Relation (Age of Onset)    No Known Problems Mother, Father            Tobacco Use    Smoking status: Never    Smokeless tobacco: Never   Substance and Sexual Activity    Alcohol use: Yes     Comment: occasionally.    Drug use: Yes     Types: Marijuana, Oxycodone    Sexual activity: Yes     Partners: Female       Review of Systems    Objective:     Temp:  [98.1 °F (36.7 °C)] 98.1 °F (36.7 °C)  Pulse:  [66] 66  Resp:  [18] 18  SpO2:  [99 %] 99 %  BP: (136)/(84) 136/84     Body mass index is 28.37 kg/m².           Lines/Drains/Airways       Peripheral Intravenous Line  Duration                  Peripheral IV -  "Single Lumen 02/20/24 1445 22 G Right Antecubital <1 day                    Physical Exam    Significant Labs:  BMP:  Recent Labs   Lab 02/20/24  1448      K 4.4      CO2 21*   BUN 14   CREATININE 1.2   CALCIUM 9.5       CBC:  Recent Labs   Lab 02/20/24  1448   WBC 6.55   HGB 13.5*   HCT 38.4*          Urine Culture: No results for input(s): "LABURIN" in the last 168 hours.  Urine Studies: No results for input(s): "COLORU", "APPEARANCEUA", "PHUR", "SPECGRAV", "PROTEINUA", "GLUCUA", "KETONESU", "BILIRUBINUA", "OCCULTUA", "NITRITE", "UROBILINOGEN", "LEUKOCYTESUR", "RBCUA", "WBCUA", "BACTERIA", "SQUAMEPITHEL", "HYALINECASTS" in the last 168 hours.    Invalid input(s): "WRIGHTSUR"    Component 10 d ago   Urine Culture, Routine  Abnormal   ESCHERICHIA COLI  10,000 - 49,999 cfu/ml    Resulting Agency [facility]        Susceptibility     Escherichia coli     CULTURE, URINE     Amox/K Clav'ate >16/8 mcg/mL Resistant     Amp/Sulbactam 16/8 mcg/mL Intermediate     Ampicillin >16 mcg/mL Resistant     Cefazolin 16 mcg/mL Intermediate     Cefepime <=2 mcg/mL Sensitive     Ceftriaxone <=1 mcg/mL Sensitive     Ciprofloxacin <=1 mcg/mL Sensitive     Ertapenem <=0.5 mcg/mL Sensitive     Gentamicin <=4 mcg/mL Sensitive     Levofloxacin <=2 mcg/mL Sensitive     Meropenem <=1 mcg/mL Sensitive     Nitrofurantoin <=32 mcg/mL Sensitive     Piperacillin/Tazo <=16 mcg/mL Sensitive     Tobramycin <=4 mcg/mL Sensitive     Trimeth/Sulfa <=2/38 mcg/mL Sensitive               Linear View         Narrative  Performed by: [facility]  Specimen Source->Urine      Specimen Collected: 02/10/24 21:20 CST                 Significant Imaging:  CT RENAL STONE STUDY ABDOMEN PELVIS WITHOUT     CLINICAL HISTORY:  Flank pain     TECHNIQUE:  5 mm unenhanced axial images from the lung bases through the greater trochanters were performed.  Coronal and sagittal reformatted images were provided.     COMPARISON:  01/06/2021     FINDINGS:  Within the limits " of a noncontrast examination, the liver, spleen, pancreas, and adrenal glands are unremarkable.  The gallbladder contains no calcified gallstones.     There is 6-7 mm left proximal ureteral calculus.  There is associated mild left hydronephrosis.  There is a 6 mm left renal calculus.     There is no gross abdominal adenopathy or ascites.  There is a tiny fat containing umbilical hernia.     There is colonic diverticulosis.  There are no pelvic masses or adenopathy.  There is no free pelvic fluid.  The appendix is not inflamed.  The prostate gland is enlarged measuring up to 5.6 cm in maximal diameter.  Consider correlation PSA.     At the lung bases, there is mild left basilar dependent atelectatic change.     There is mild levoscoliosis.     Impression:     6-7 mm left proximal ureteral calculus with resultant mild hydronephrosis.  6 mm left intrarenal calculus.     Colonic diverticulosis.     Prostatomegaly.        Electronically signed by: Heather Canchola  Date:                                            02/04/2024  Time:                                           18:10    Assessment and Plan:     There are no hospital problems to display for this patient.    Left renal stone  Left ureteral stone  UTI    Rocephin  NSAID and Dilaudid prn  Will move ureteroscopy to Thursday 2/22/24  Will keep in ED[facility] until then  Will ask ED[facility] team to help manage  If admission is necessary, please have hosp med team admit patient    VTE Risk Mitigation (From admission, onward)      None              Thank you for your consult. I will sign off. Please contact us if you have any additional questions.    Zach Valencia MD  Urology  [facility] - Emergency Dept (Observation)

## 2024-02-20 NOTE — ED PROVIDER NOTES
"     Source of History:  Patient     Chief complaint:  Flank Pain (Pt states that he was seen a few days ago for kidney stone, was supposed to have a procedure today but they rescheduled it. Pt having l sided flank with nausea and vomiting)      HPI:  Hardik Jimenez is a 48 y.o. male presenting with continued left-sided flank pain.  With associated nausea vomiting.  Patient has known left-sided stone measuring 6-7 mm.  He underwent stent placement on 2/7.  States he has continued pain that waxes and wanes with the persistent discomfort secondary to stent.  He does report pain increased today with associated vomiting.  Has been taking pain medicine as described.  Denies any infectious symptoms.  Did discuss with Urology in recent visit and is going to undergo a left your ureteroscopy with lithotripsy.    This is the extent to the patients complaints today here in the emergency department.    ROS: As per HPI     Review of patient's allergies indicates:  No Known Allergies    PMH:  As per HPI and below:  Past Medical History:   Diagnosis Date    Arthritis     Asthma     Kidney stones     Renal disorder     Kidney stones    Sciatica      Past Surgical History:   Procedure Laterality Date    COLONOSCOPY N/A 3/27/2018    Procedure: COLONOSCOPY;  Surgeon: Sara Rios MD;  Location: [facility];  Service: Endoscopy;  Laterality: N/A;    CYSTOSCOPY W/ URETERAL STENT PLACEMENT Left 2/7/2024    Procedure: CYSTOSCOPY, WITH URETERAL STENT INSERTION;  Surgeon: Skye Chew MD;  Location: [facility];  Service: Urology;  Laterality: Left;       Social History     Tobacco Use    Smoking status: Never    Smokeless tobacco: Never   Substance Use Topics    Alcohol use: Yes     Comment: occasionally.    Drug use: Yes     Types: Marijuana, Oxycodone       Physical Exam:    /84 (BP Location: Left arm, Patient Position: Sitting)   Pulse 66   Temp 98.1 °F (36.7 °C) (Oral)   Resp 18   Ht 6' 1" (1.854 m)   Wt 97.5 kg (215 " lb)   SpO2 99%   BMI 28.37 kg/m²   Nursing note and vital signs reviewed.  Constitutional: No acute distress.  Nontoxic  Eyes: No conjunctival injection.Extraocular muscles are intact.  ENT: Oropharynx clear.  Normal phonation.   Cardiovascular: Regular rate and rhythm.  No murmurs. No gallops. No rubs  Respiratory: Clear to auscultation bilaterally.  Good air movement.  No wheezes.  No rhonchi. No rales. No accessory muscle use..  Abdomen: Soft.  Not distended.  Nontender.  No guarding.  No rebound. Non-peritoneal.  Musculoskeletal: Good range of motion all joints.  No deformities.  Neck supple.  No meningismus.  Left-sided CVA tenderness  Skin: No rashes seen.  Good turgor.  No abrasions.  No ecchymoses.  Neurologic: Motor intact.  Sensation intact.  No ataxia. No focal neurological deficits.  Psych: Appropriate, conversant    Labs that have been ordered have been independently reviewed and interpreted by myself.    I decided to obtain the patient's medical records.      MDM/ Differential Dx:    Hardik Jimenez 48 y.o. presented to the ED with c/o continued left flank pain.  Physical exam reveals patient nontoxic in appearance who appears uncomfortable secondary to pain.  No focal abdominal pain.  Left-sided CVA tenderness    Differential Diagnosis includes, but is not limited to:  AAA, aortic dissection, SBO/volvulus, intussusception, ileus, appendicitis, cholecystitis, hepatitis, nephrolithiasis, pancreatitis, IBD/IBS, biliary colic, GERD, PUD, constipation, UTI/pyelonephritis, musculoskeletal pain.       ED management:  Patient seen in front and process were initial labs were ordered.  Will also add IV fluids and antiemetics.  Analgesics ordered.  Initiated Toradol however no significant improvement.  Dilaudid ordered with some improvement.  Urology was notified of return related to known ureterolithiasis.  They evaluated the patient recommend admission    Impression/Plan: Patient informed of diagnosis The  primary encounter diagnosis was Left flank pain. Diagnoses of Ureterolithiasis, Nausea and vomiting, unspecified vomiting type, and Obstructed, uropathy were also pertinent to this visit.  Will admit to Hospital Medicine as per Urology recommendations for continued pain control and to urological procedure on 02/22    Results for orders placed or performed during the hospital encounter of 02/20/24   CBC auto differential   Result Value Ref Range    WBC 6.55 3.90 - 12.70 K/uL    RBC 4.63 4.60 - 6.20 M/uL    Hemoglobin 13.5 (L) 14.0 - 18.0 g/dL    Hematocrit 38.4 (L) 40.0 - 54.0 %    MCV 83 82 - 98 fL    MCH 29.2 27.0 - 31.0 pg    MCHC 35.2 32.0 - 36.0 g/dL    RDW 13.4 11.5 - 14.5 %    Platelets 275 150 - 450 K/uL    MPV 9.0 (L) 9.2 - 12.9 fL    Immature Granulocytes 0.5 0.0 - 0.5 %    Gran # (ANC) 4.4 1.8 - 7.7 K/uL    Immature Grans (Abs) 0.03 0.00 - 0.04 K/uL    Lymph # 1.4 1.0 - 4.8 K/uL    Mono # 0.7 0.3 - 1.0 K/uL    Eos # 0.1 0.0 - 0.5 K/uL    Baso # 0.03 0.00 - 0.20 K/uL    nRBC 0 0 /100 WBC    Gran % 66.6 38.0 - 73.0 %    Lymph % 21.1 18.0 - 48.0 %    Mono % 10.2 4.0 - 15.0 %    Eosinophil % 1.1 0.0 - 8.0 %    Basophil % 0.5 0.0 - 1.9 %    Differential Method Automated    Comprehensive metabolic panel   Result Value Ref Range    Sodium 137 136 - 145 mmol/L    Potassium 4.4 3.5 - 5.1 mmol/L    Chloride 108 95 - 110 mmol/L    CO2 21 (L) 23 - 29 mmol/L    Glucose 112 (H) 70 - 110 mg/dL    BUN 14 6 - 20 mg/dL    Creatinine 1.2 0.5 - 1.4 mg/dL    Calcium 9.5 8.7 - 10.5 mg/dL    Total Protein 7.5 6.0 - 8.4 g/dL    Albumin 4.0 3.5 - 5.2 g/dL    Total Bilirubin 0.4 0.1 - 1.0 mg/dL    Alkaline Phosphatase 72 55 - 135 U/L    AST 21 10 - 40 U/L    ALT 23 10 - 44 U/L    eGFR >60 >60 mL/min/1.73 m^2    Anion Gap 8 8 - 16 mmol/L   Urinalysis, Reflex to Urine Culture Urine, Clean Catch    Specimen: Urine   Result Value Ref Range    Specimen UA Urine, Clean Catch     Color, UA Yellow Yellow, Straw, Jyothi    Appearance, UA  Clear Clear    pH, UA 7.0 5.0 - 8.0    Specific Gravity, UA 1.020 1.005 - 1.030    Protein, UA 1+ (A) Negative    Glucose, UA Negative Negative    Ketones, UA Negative Negative    Bilirubin (UA) Negative Negative    Occult Blood UA 3+ (A) Negative    Nitrite, UA Negative Negative    Urobilinogen, UA Negative <2.0 EU/dL    Leukocytes, UA 3+ (A) Negative   Urinalysis Microscopic   Result Value Ref Range    RBC, UA >100 (H) 0 - 4 /hpf    WBC, UA 10 (H) 0 - 5 /hpf    Bacteria Rare None-Occ /hpf    Hyaline Casts, UA 0 0-1/lpf /lpf    Microscopic Comment SEE COMMENT      Imaging Results    None                   Diagnostic Impression:    1. Left flank pain    2. Ureterolithiasis    3. Nausea and vomiting, unspecified vomiting type    4. Obstructed, uropathy         ED Disposition Condition    Observation Stable                  Sima Burgos PA  02/22/24 2850

## 2024-02-20 NOTE — ED TRIAGE NOTES
PT arrived with c/o L flank pain starting today.  Pt states he was recently dx'd with kidney sone, was supposed to have a procedure done yesterday with Dr. Valencia, but it was cancelled.  Pt reports return of pain and nausea, denies any vomiting.  Denies any urinary symptoms at present.  Pt answering questions appropriately, speaking in complete sentences, respirations even and unlabored.  Aao x 4.

## 2024-02-20 NOTE — ASSESSMENT & PLAN NOTE
Hardik Jimenez presents for worsening abdominal pain from his known lef tproximal 7mm and 6mm renal stone, left stent left in place    - UA shows > 100 RBC, 10 WBC, 3+ leukocyte  - urology consulted, plan for ureteroscopy Thursday 2/22/24  - NSAID and dialudid PRN  - IV CTX started 2/20  - NPO @ [facility] on Thurs 2/22  - ondansetron and prochlorperazine PRN

## 2024-02-20 NOTE — H&P
[facility] - Emergency Dept (Observation)  [facility] Medicine  History & Physical    Patient Name: Hardik Jimenez  MRN: [id number]  Patient Class: OP- Observation  Admission Date: 2/20/2024  Attending Physician: Amber Comer MD   Primary Care Provider: Kelvin Pablo DO         Patient information was obtained from patient, past medical records, and ER records.     Subjective:     Principal Problem:Left nephrolithiasis    Chief Complaint:   Chief Complaint   Patient presents with    Flank Pain     Pt states that he was seen a few days ago for kidney stone, was supposed to have a procedure today but they rescheduled it. Pt having l sided flank with nausea and vomiting        HPI: Hardik Jimenez is a 48M with known left proximal 7mm and 6mm renal stone with stent in place who presents for worsening abdominal pain. The pain is on his left flank,. History limited during exam as patient is actively vomiting. Improving over course of interview after receiving IV Zofran. Denies difficulty urinating, change in frequency. Initially was scheduled for outpatient ureteroscopy on Monday 2/26, but pain has persisted and been unmanageable at home. The nausea and vomiting began today, he believes secondary to medications. Appreciate urology consult, plan for inpatient ureteroscopy on 2/22.     Past Medical History:   Diagnosis Date    Arthritis     Asthma     Kidney stones     Renal disorder     Kidney stones    Sciatica        Past Surgical History:   Procedure Laterality Date    COLONOSCOPY N/A 3/27/2018    Procedure: COLONOSCOPY;  Surgeon: Sara Rios MD;  Location: [facility];  Service: Endoscopy;  Laterality: N/A;    CYSTOSCOPY W/ URETERAL STENT PLACEMENT Left 2/7/2024    Procedure: CYSTOSCOPY, WITH URETERAL STENT INSERTION;  Surgeon: Skye Chew MD;  Location: [facility];  Service: Urology;  Laterality: Left;       Review of patient's allergies indicates:  No Known Allergies    No current  facility-administered medications on file prior to encounter.     Current Outpatient Medications on File Prior to Encounter   Medication Sig    albuterol (PROVENTIL/VENTOLIN HFA) 90 mcg/actuation inhaler Inhale 1-2 puffs into the lungs every 6 (six) hours as needed for Wheezing. Rescue    ondansetron (ZOFRAN-ODT) 4 MG TbDL Take 2 tablets (8 mg total) by mouth every 6 (six) hours as needed (nausea).    oxybutynin (DITROPAN) 5 MG Tab Take 1 tablet (5 mg total) by mouth 3 (three) times daily as needed (bladder spasms).    oxyCODONE-acetaminophen (PERCOCET) 5-325 mg per tablet Take 1 tablet by mouth every 4 (four) hours as needed for Pain.    tamsulosin (FLOMAX) 0.4 mg Cap Take 1 capsule (0.4 mg total) by mouth once daily.    [DISCONTINUED] cyclobenzaprine (FLEXERIL) 10 MG tablet Take 1 tablet (10 mg total) by mouth 3 (three) times daily as needed for Muscle spasms. (Patient not taking: Reported on 10/26/2023)    [DISCONTINUED] gabapentin (NEURONTIN) 100 MG capsule 1@ HS X 3 days then one 3X/day X 3 days then two 3X/day X3 days then 3caps 3X/day after.Stay@ most comfortable dose (Patient not taking: Reported on 10/26/2023)    [DISCONTINUED] HYDROcodone-acetaminophen (NORCO) 5-325 mg per tablet Take 1 tablet by mouth every 6 (six) hours as needed.    [DISCONTINUED] ibuprofen (ADVIL,MOTRIN) 600 MG tablet Take 1 tablet (600 mg total) by mouth every 6 (six) hours as needed for Pain. (Patient not taking: Reported on 2/12/2024)    [DISCONTINUED] meloxicam (MOBIC) 15 MG tablet Take 1 tablet (15 mg total) by mouth once daily. (Patient not taking: Reported on 10/26/2023)    [DISCONTINUED] ondansetron (ZOFRAN-ODT) 4 MG TbDL Take 1 tablet (4 mg total) by mouth every 8 (eight) hours as needed (for nausea).    [DISCONTINUED] predniSONE (DELTASONE) 10 MG tablet Take 1 tablet (10 mg total) by mouth 2 (two) times daily. (Patient not taking: Reported on 2/12/2024)    [DISCONTINUED] promethazine (PHENERGAN) 6.25 mg/5 mL syrup Take 20 mLs  (25 mg total) by mouth every 6 (six) hours as needed for Nausea. (Patient not taking: Reported on 2/12/2024)     Family History       Problem Relation (Age of Onset)    No Known Problems Mother, Father          Tobacco Use    Smoking status: Never    Smokeless tobacco: Never   Substance and Sexual Activity    Alcohol use: Yes     Comment: occasionally.    Drug use: Yes     Types: Marijuana, Oxycodone    Sexual activity: Yes     Partners: Female     Review of Systems   Constitutional:  Negative for fever.   Respiratory:  Negative for shortness of breath.    Cardiovascular:  Negative for chest pain.   Gastrointestinal:  Positive for abdominal pain, nausea and vomiting. Negative for diarrhea.   Genitourinary:  Positive for flank pain. Negative for difficulty urinating.   Psychiatric/Behavioral:  Negative for agitation and confusion.      Objective:     Vital Signs (Most Recent):  Temp: 98.1 °F (36.7 °C) (02/20/24 1412)  Pulse: 66 (02/20/24 1412)  Resp: 18 (02/20/24 1703)  BP: 136/84 (02/20/24 1412)  SpO2: 99 % (02/20/24 1412) Vital Signs (24h Range):  Temp:  [98.1 °F (36.7 °C)] 98.1 °F (36.7 °C)  Pulse:  [66] 66  Resp:  [18] 18  SpO2:  [99 %] 99 %  BP: (136)/(84) 136/84     Weight: 97.5 kg (215 lb)  Body mass index is 28.37 kg/m².     Physical Exam  Constitutional:       General: He is in acute distress (actively vomiting).      Appearance: He is ill-appearing.   HENT:      Head: Normocephalic and atraumatic.   Pulmonary:      Effort: Pulmonary effort is normal. No respiratory distress.   Abdominal:      General: Abdomen is flat. There is no distension.   Genitourinary:     Comments: About 200mL of yellow urine in urinal at bedside  Skin:     General: Skin is warm.   Neurological:      General: No focal deficit present.      Mental Status: He is alert.   Psychiatric:         Mood and Affect: Mood normal.         Behavior: Behavior normal.                Significant Labs: All pertinent labs within the past 24 hours have  been reviewed.    Significant Imaging: I have reviewed all pertinent imaging results/findings within the past 24 hours.  Assessment/Plan:     * Left nephrolithiasis  Hardik Jimenez presents for worsening abdominal pain from his known lef tproximal 7mm and 6mm renal stone, left stent left in place    - UA shows > 100 RBC, 10 WBC, 3+ leukocyte  - urology consulted, plan for ureteroscopy Thursday 2/22/24  - NSAID and dialudid PRN  - IV CTX started 2/20  - NPO @ [facility] on Thurs 2/22  - ondansetron and prochlorperazine PRN         VTE Risk Mitigation (From admission, onward)           Ordered     IP VTE HIGH RISK PATIENT  Once         02/20/24 1704                                  Kristyn Chery PA-C  Department of Hospital Medicine  [facility] - Emergency Dept (Observation)

## 2024-02-20 NOTE — FIRST PROVIDER EVALUATION
Emergency Department TeleTriage Encounter Note      CHIEF COMPLAINT    Chief Complaint   Patient presents with    Flank Pain     Pt states that he was seen a few days ago for kidney stone, was supposed to have a procedure today but they rescheduled it. Pt having l sided flank with nausea and vomiting       VITAL SIGNS   Initial Vitals [02/20/24 1412]   BP Pulse Resp Temp SpO2   136/84 66 18 98.1 °F (36.7 °C) 99 %      MAP       --            ALLERGIES    Review of patient's allergies indicates:  No Known Allergies    PROVIDER TRIAGE NOTE  Patient with recent renal stone dx presenting with severe flank pain, nausea and vomiting. He was supposed to have a procedure,  but it was rescheduled for next week.       ORDERS  Labs Reviewed - No data to display    ED Orders (720h ago, onward)      None              Virtual Visit Note: The provider triage portion of this emergency department evaluation and documentation was performed via [employer], a HIPAA-compliant telemedicine application, in concert with a tele-presenter in the room. A face to face patient evaluation with one of my colleagues will occur once the patient is placed in an emergency department room.      DISCLAIMER: This note was prepared with ENOVIX*citizenmade voice recognition transcription software. Garbled syntax, mangled pronouns, and other bizarre constructions may be attributed to that software system.

## 2024-02-20 NOTE — HPI
Hardik Jimenez is a 48M with known left proximal 7mm and 6mm renal stone with stent in place who presents for worsening abdominal pain. The pain is on his left flank,. History limited during exam as patient is actively vomiting. Improving over course of interview after receiving IV Zofran. Denies difficulty urinating, change in frequency. Initially was scheduled for outpatient ureteroscopy on Monday 2/26, but pain has persisted and been unmanageable at home. The nausea and vomiting began today, he believes secondary to medications. Appreciate urology consult, plan for inpatient ureteroscopy on 2/22.

## 2024-02-21 PROBLEM — N20.1 LEFT URETERAL CALCULUS: Status: ACTIVE | Noted: 2024-02-21

## 2024-02-21 PROCEDURE — 63600175 PHARM REV CODE 636 W HCPCS: Performed by: UROLOGY

## 2024-02-21 PROCEDURE — 96366 THER/PROPH/DIAG IV INF ADDON: CPT

## 2024-02-21 PROCEDURE — 99283 EMERGENCY DEPT VISIT LOW MDM: CPT | Mod: ,,, | Performed by: UROLOGY

## 2024-02-21 PROCEDURE — G0378 HOSPITAL OBSERVATION PER HR: HCPCS

## 2024-02-21 PROCEDURE — 87086 URINE CULTURE/COLONY COUNT: CPT | Performed by: NURSE PRACTITIONER

## 2024-02-21 PROCEDURE — 96376 TX/PRO/DX INJ SAME DRUG ADON: CPT

## 2024-02-21 PROCEDURE — 63600175 PHARM REV CODE 636 W HCPCS: Performed by: PHYSICIAN ASSISTANT

## 2024-02-21 PROCEDURE — 25000003 PHARM REV CODE 250: Performed by: PHYSICIAN ASSISTANT

## 2024-02-21 PROCEDURE — 94761 N-INVAS EAR/PLS OXIMETRY MLT: CPT

## 2024-02-21 PROCEDURE — 25000003 PHARM REV CODE 250: Performed by: UROLOGY

## 2024-02-21 PROCEDURE — 99900035 HC TECH TIME PER 15 MIN (STAT)

## 2024-02-21 RX ADMIN — POLYETHYLENE GLYCOL 3350 17 G: 17 POWDER, FOR SOLUTION ORAL at 09:02

## 2024-02-21 RX ADMIN — CEFTRIAXONE SODIUM 1 G: 1 INJECTION, POWDER, FOR SOLUTION INTRAMUSCULAR; INTRAVENOUS at 04:02

## 2024-02-21 RX ADMIN — TAMSULOSIN HYDROCHLORIDE 0.4 MG: 0.4 CAPSULE ORAL at 09:02

## 2024-02-21 RX ADMIN — ONDANSETRON 4 MG: 2 INJECTION INTRAMUSCULAR; INTRAVENOUS at 05:02

## 2024-02-21 RX ADMIN — HYDROMORPHONE HYDROCHLORIDE 1 MG: 1 INJECTION, SOLUTION INTRAMUSCULAR; INTRAVENOUS; SUBCUTANEOUS at 05:02

## 2024-02-21 NOTE — SUBJECTIVE & OBJECTIVE
Interval History: Mr Dye is resting in his bed. He reports that his pain is controlled. He has no concerns at this time.     Review of Systems   Constitutional:  Negative for activity change and diaphoresis.   Eyes:  Negative for visual disturbance.   Respiratory:  Negative for cough and shortness of breath.    Cardiovascular:  Negative for chest pain.   Gastrointestinal:  Positive for abdominal pain (left side-controlled). Negative for abdominal distention, constipation, nausea and vomiting.   Genitourinary:  Positive for flank pain (left-controlled). Negative for dysuria.     Objective:     Vital Signs (Most Recent):  Temp: 98.5 °F (36.9 °C) (02/21/24 1351)  Pulse: 70 (02/21/24 1351)  Resp: 18 (02/21/24 1351)  BP: 135/82 (02/21/24 1351)  SpO2: 99 % (02/21/24 1351) Vital Signs (24h Range):  Temp:  [96.7 °F (35.9 °C)-98.7 °F (37.1 °C)] 98.5 °F (36.9 °C)  Pulse:  [56-96] 70  Resp:  [16-18] 18  SpO2:  [97 %-99 %] 99 %  BP: (128-135)/(77-86) 135/82     Weight: 97.5 kg (215 lb)  Body mass index is 28.37 kg/m².  No intake or output data in the 24 hours ending 02/21/24 1649      Physical Exam  Vitals reviewed.   Cardiovascular:      Rate and Rhythm: Normal rate.   Pulmonary:      Effort: Pulmonary effort is normal. No respiratory distress.   Abdominal:      General: There is no distension.      Palpations: Abdomen is soft.      Tenderness: There is no abdominal tenderness.   Musculoskeletal:      Right lower leg: No edema.      Left lower leg: No edema.   Neurological:      General: No focal deficit present.      Mental Status: He is alert.             Significant Labs: All pertinent labs within the past 24 hours have been reviewed.  BMP:   Recent Labs   Lab 02/20/24  1448   *      K 4.4      CO2 21*   BUN 14   CREATININE 1.2   CALCIUM 9.5     CBC:   Recent Labs   Lab 02/20/24  1448   WBC 6.55   HGB 13.5*   HCT 38.4*          Significant Imaging: I have reviewed all pertinent imaging  results/findings within the past 24 hours.

## 2024-02-21 NOTE — ASSESSMENT & PLAN NOTE
Hardik Jimenez presents for worsening abdominal pain from his known left proximal 7mm and 6mm renal stone, left stent left in place    - UA shows > 100 RBC, 10 WBC, 3+ leukocyte  - urology consulted, plan for ureteroscopy Thursday 2/22/24  - NSAID and dialudid PRN  - IV CTX started 2/20  - urine culture ordered  - NPO @ [facility] on Thurs 2/22  - ondansetron and prochlorperazine PRN

## 2024-02-21 NOTE — SUBJECTIVE & OBJECTIVE
Interval History:     Feeling much better  Eager to go home    Review of Systems  Objective:     Temp:  [96.7 °F (35.9 °C)-98.7 °F (37.1 °C)] 98.2 °F (36.8 °C)  Pulse:  [] 108  Resp:  [16-18] 18  SpO2:  [97 %-99 %] 97 %  BP: (128-141)/(77-88) 141/88     Body mass index is 28.37 kg/m².           Drains       None                    Physical Exam    Ao*4  resp normal rate' breathing not labored  cv normal rate  Ab nondistended; nontender  Ext no edema  No cva tenderness  Significant Labs:    BMP:  Recent Labs   Lab 02/20/24  1448      K 4.4      CO2 21*   BUN 14   CREATININE 1.2   CALCIUM 9.5       CBC:   Recent Labs   Lab 02/20/24  1448   WBC 6.55   HGB 13.5*   HCT 38.4*          Urine Studies:   Recent Labs   Lab 02/20/24  1552   COLORU Yellow   APPEARANCEUA Clear   PHUR 7.0   SPECGRAV 1.020   PROTEINUA 1+*   GLUCUA Negative   KETONESU Negative   BILIRUBINUA Negative   OCCULTUA 3+*   NITRITE Negative   UROBILINOGEN Negative   LEUKOCYTESUR 3+*   RBCUA >100*   WBCUA 10*   BACTERIA Rare   HYALINECASTS 0       Significant Imaging:  -

## 2024-02-21 NOTE — PROGRESS NOTES
[facility] Emergency Dept (Observation)  [facility] Medicine  Progress Note    Patient Name: Hardik Jimenez  MRN: [id number]  Patient Class: OP- Observation   Admission Date: 2/20/2024  Length of Stay: 0 days  Attending Physician: Zach Valencia MD  Primary Care Provider: Kelvin Pablo DO        Subjective:     Principal Problem:Left nephrolithiasis        HPI:  Hardik Jimenez is a 48M with known left proximal 7mm and 6mm renal stone with stent in place who presents for worsening abdominal pain. The pain is on his left flank,. History limited during exam as patient is actively vomiting. Improving over course of interview after receiving IV Zofran. Denies difficulty urinating, change in frequency. Initially was scheduled for outpatient ureteroscopy on Monday 2/26, but pain has persisted and been unmanageable at home. The nausea and vomiting began today, he believes secondary to medications. Appreciate urology consult, plan for inpatient ureteroscopy on 2/22.     Overview/Hospital Course:  Mr Dye reports that his pain is controlled and he is tolerating regular diet. He is awaiting a cystoscope 2/22.    Interval History: Mr Dye is resting in his bed. He reports that his pain is controlled. He has no concerns at this time.     Review of Systems   Constitutional:  Negative for activity change and diaphoresis.   Eyes:  Negative for visual disturbance.   Respiratory:  Negative for cough and shortness of breath.    Cardiovascular:  Negative for chest pain.   Gastrointestinal:  Positive for abdominal pain (left side-controlled). Negative for abdominal distention, constipation, nausea and vomiting.   Genitourinary:  Positive for flank pain (left-controlled). Negative for dysuria.     Objective:     Vital Signs (Most Recent):  Temp: 98.5 °F (36.9 °C) (02/21/24 1351)  Pulse: 70 (02/21/24 1351)  Resp: 18 (02/21/24 1351)  BP: 135/82 (02/21/24 1351)  SpO2: 99 % (02/21/24 1351) Vital Signs (24h Range):  Temp:  [96.7 °F  (35.9 °C)-98.7 °F (37.1 °C)] 98.5 °F (36.9 °C)  Pulse:  [56-96] 70  Resp:  [16-18] 18  SpO2:  [97 %-99 %] 99 %  BP: (128-135)/(77-86) 135/82     Weight: 97.5 kg (215 lb)  Body mass index is 28.37 kg/m².  No intake or output data in the 24 hours ending 02/21/24 1649      Physical Exam  Vitals reviewed.   Cardiovascular:      Rate and Rhythm: Normal rate.   Pulmonary:      Effort: Pulmonary effort is normal. No respiratory distress.   Abdominal:      General: There is no distension.      Palpations: Abdomen is soft.      Tenderness: There is no abdominal tenderness.   Musculoskeletal:      Right lower leg: No edema.      Left lower leg: No edema.   Neurological:      General: No focal deficit present.      Mental Status: He is alert.             Significant Labs: All pertinent labs within the past 24 hours have been reviewed.  BMP:   Recent Labs   Lab 02/20/24  1448   *      K 4.4      CO2 21*   BUN 14   CREATININE 1.2   CALCIUM 9.5     CBC:   Recent Labs   Lab 02/20/24  1448   WBC 6.55   HGB 13.5*   HCT 38.4*          Significant Imaging: I have reviewed all pertinent imaging results/findings within the past 24 hours.    Assessment/Plan:      * Left nephrolithiasis  Hardik Jimenez presents for worsening abdominal pain from his known left proximal 7mm and 6mm renal stone, left stent left in place    - UA shows > 100 RBC, 10 WBC, 3+ leukocyte  - urology consulted, plan for ureteroscopy Thursday 2/22/24  - NSAID and dialudid PRN  - IV CTX started 2/20  - urine culture ordered  - NPO @ [facility] on Thurs 2/22  - ondansetron and prochlorperazine PRN         VTE Risk Mitigation (From admission, onward)           Ordered     IP VTE HIGH RISK PATIENT  Once         02/20/24 1704                    Discharge Planning   CHRISTOFER: 2/22/2024     Code Status: Full Code   Is the patient medically ready for discharge?:     Reason for patient still in hospital (select all that apply): Consult recommendations  Discharge  Plan A: Home, Home with family                  Justyna Magdaleno DNP  Department of Hospital Medicine   [facility] - Emergency Dept (Observation)

## 2024-02-21 NOTE — HPI
48male  Left prox 7mm stone and 6mm left renal renal stone  Left stent in place        Scheduled for left ureteroscopy as outpatient on Monday     Returns with pain  Admitted to [facility]     He is not sure if he is on antibiotics  Denies fever  Wishes to have procedure moved up

## 2024-02-21 NOTE — ASSESSMENT & PLAN NOTE
- 6mm L prox ureteral stone. Failed outpatient management, stent placed previously.   - Continues to have pain though now well controlled   - Planned for L URS with Dr Valencia tomorrow   - NPO at [facility]   - Hopefully d/c home after procedure

## 2024-02-21 NOTE — PLAN OF CARE
LMSW met with the patient at the bedside. Patient is alert and oriented with no communication barriers. Prior to admission, the patient is independent. Patient denies the use of HH or DME. Patients PCP is correct on the face sheet. Patient choice pharmacy is bedside. Patient's family will provide transportation home.  [facility] - Emergency Dept (Observation)  Initial Discharge Assessment       Primary Care Provider: Kelvin Pablo DO    Admission Diagnosis: Left flank pain [R10.9]    Admission Date: 2/20/2024  Expected Discharge Date:     Transition of Care Barriers: None    Payor: BLUE CROSS [employer] B[employer]EFIT / Plan: [employer] BLUE [employer]OSS [place] / Product Type: Self Funded /     Extended Emergency Contact Information  Primary Emergency Contact: Rajwinder Reich  Address: [address] [place] [id number] [facility]  Home Phone: [phone]  Mobile Phone: [phone]  Relation: Spouse  Preferred language: English    Discharge Plan A: Home, Home with family         [id number] - [place], [place] - [address]  [address]  [place] [address]  Phone: [phone] Fax: [phone]    [id number] - [place], [place] - [address]  [address]  [place] [address]  Phone: [phone] Fax: [phone]      Initial Assessment (most recent)       Adult Discharge Assessment - 02/21/24 0855          Discharge Assessment    Assessment Type Discharge Planning Assessment     Confirmed/corrected address, phone number and insurance Yes     Confirmed Demographics Correct on Facesheet     Source of Information patient;health record     People in Home alone;spouse     Do you expect to return to your current living situation? Yes     Do you have help at home or someone to help you manage your care at home? Yes     Prior to hospitilization cognitive status: Alert/Oriented;No Deficits     Current cognitive status: Alert/Oriented;No Deficits      Equipment Currently Used at Home none     Readmission within 30 days? No     Patient currently being followed by outpatient case management? No     Do you currently have service(s) that help you manage your care at home? No     Do you take prescription medications? No     Do you have prescription coverage? Yes     Do you have any problems affording any of your prescribed medications? No     Is the patient taking medications as prescribed? yes     How do you get to doctors appointments? car, drives self;family or friend will provide     Are you on dialysis? No     Do you take coumadin? No     Discharge Plan A Home;Home with family     Discharge Plan discussed with: Patient     Transition of Care Barriers None

## 2024-02-21 NOTE — ASSESSMENT & PLAN NOTE
POD1 sp ureteroscopy     Doing well  Stones completely fragmented with laser lithotripsy    OK to DC home with percocet, flomax and pyridium    See Dr Valencia at 1pm Mon 2/26 to remove stent

## 2024-02-21 NOTE — SUBJECTIVE & OBJECTIVE
Interval History: Pain currently well controlled. Tolerating diet.     Review of Systems   Constitutional:  Negative for chills and fever.   HENT:  Negative for hearing loss, sore throat and trouble swallowing.    Eyes: Negative.    Respiratory:  Negative for cough and shortness of breath.    Cardiovascular:  Negative for chest pain.   Gastrointestinal:  Positive for abdominal pain. Negative for abdominal distention, constipation, diarrhea, nausea and vomiting.   Genitourinary:  Positive for flank pain and urgency. Negative for frequency.   Musculoskeletal:  Negative for arthralgias and neck pain.   Skin:  Negative for color change, pallor and rash.   Neurological:  Negative for dizziness and seizures.   Hematological:  Negative for adenopathy.   Psychiatric/Behavioral:  Negative for confusion.    All other systems reviewed and are negative.    Objective:     Temp:  [96.7 °F (35.9 °C)-98.7 °F (37.1 °C)] 96.7 °F (35.9 °C)  Pulse:  [56-96] 76  Resp:  [16-18] 18  SpO2:  [97 %-99 %] 99 %  BP: (128-136)/(77-86) 135/81     Body mass index is 28.37 kg/m².           Drains       None                    Physical Exam  Vitals reviewed.   Constitutional:       General: He is not in acute distress.     Appearance: He is well-developed. He is not diaphoretic.      Comments: Resting comfortably   HENT:      Head: Normocephalic and atraumatic.   Eyes:      General: No scleral icterus.        Right eye: No discharge.         Left eye: No discharge.   Pulmonary:      Effort: No respiratory distress.   Abdominal:      General: Bowel sounds are normal. There is no distension.      Palpations: Abdomen is soft.      Tenderness: There is no abdominal tenderness. There is no guarding or rebound.   Musculoskeletal:         General: Normal range of motion.      Cervical back: Normal range of motion and neck supple.   Skin:     General: Skin is warm and dry.      Findings: No erythema.   Neurological:      Mental Status: He is alert and  "oriented to person, place, and time.           Significant Labs:    BMP:  Recent Labs   Lab 02/20/24  1448      K 4.4      CO2 21*   BUN 14   CREATININE 1.2   CALCIUM 9.5       CBC:   Recent Labs   Lab 02/20/24  1448   WBC 6.55   HGB 13.5*   HCT 38.4*          Blood Culture: No results for input(s): "LABBLOO" in the last 168 hours.  Urine Culture: No results for input(s): "LABURIN" in the last 168 hours.  Urine Studies:   Recent Labs   Lab 02/20/24  1552   COLORU Yellow   APPEARANCEUA Clear   PHUR 7.0   SPECGRAV 1.020   PROTEINUA 1+*   GLUCUA Negative   KETONESU Negative   BILIRUBINUA Negative   OCCULTUA 3+*   NITRITE Negative   UROBILINOGEN Negative   LEUKOCYTESUR 3+*   RBCUA >100*   WBCUA 10*   BACTERIA Rare   HYALINECASTS 0       Significant Imaging:  All pertinent imaging results/findings from the past 24 hours have been reviewed.                "

## 2024-02-21 NOTE — PROGRESS NOTES
[facility] - Emergency Dept (Observation)  Urology  Progress Note    Patient Name: Hardik Jimenez  MRN: [id number]  Admission Date: 2/20/2024  Hospital Length of Stay: 0 days  Code Status: Full Code   Attending Provider: Zach Valencia MD   Primary Care Physician: Kelvin Pablo DO    Subjective:     HPI:  48male  Left prox 7mm stone and 6mm left renal renal stone  Left stent in place        Scheduled for left ureteroscopy as outpatient on Monday     Returns with pain  Admitted to EDOU     He is not sure if he is on antibiotics  Denies fever  Wishes to have procedure moved up    Interval History: Pain currently well controlled. Tolerating diet.     Review of Systems   Constitutional:  Negative for chills and fever.   HENT:  Negative for hearing loss, sore throat and trouble swallowing.    Eyes: Negative.    Respiratory:  Negative for cough and shortness of breath.    Cardiovascular:  Negative for chest pain.   Gastrointestinal:  Positive for abdominal pain. Negative for abdominal distention, constipation, diarrhea, nausea and vomiting.   Genitourinary:  Positive for flank pain and urgency. Negative for frequency.   Musculoskeletal:  Negative for arthralgias and neck pain.   Skin:  Negative for color change, pallor and rash.   Neurological:  Negative for dizziness and seizures.   Hematological:  Negative for adenopathy.   Psychiatric/Behavioral:  Negative for confusion.    All other systems reviewed and are negative.    Objective:     Temp:  [96.7 °F (35.9 °C)-98.7 °F (37.1 °C)] 96.7 °F (35.9 °C)  Pulse:  [56-96] 76  Resp:  [16-18] 18  SpO2:  [97 %-99 %] 99 %  BP: (128-136)/(77-86) 135/81     Body mass index is 28.37 kg/m².           Drains       None                    Physical Exam  Vitals reviewed.   Constitutional:       General: He is not in acute distress.     Appearance: He is well-developed. He is not diaphoretic.      Comments: Resting comfortably   HENT:      Head: Normocephalic and atraumatic.   Eyes:  "     General: No scleral icterus.        Right eye: No discharge.         Left eye: No discharge.   Pulmonary:      Effort: No respiratory distress.   Abdominal:      General: Bowel sounds are normal. There is no distension.      Palpations: Abdomen is soft.      Tenderness: There is no abdominal tenderness. There is no guarding or rebound.   Musculoskeletal:         General: Normal range of motion.      Cervical back: Normal range of motion and neck supple.   Skin:     General: Skin is warm and dry.      Findings: No erythema.   Neurological:      Mental Status: He is alert and oriented to person, place, and time.           Significant Labs:    BMP:  Recent Labs   Lab 02/20/24  1448      K 4.4      CO2 21*   BUN 14   CREATININE 1.2   CALCIUM 9.5       CBC:   Recent Labs   Lab 02/20/24  1448   WBC 6.55   HGB 13.5*   HCT 38.4*          Blood Culture: No results for input(s): "LABBLOO" in the last 168 hours.  Urine Culture: No results for input(s): "LABURIN" in the last 168 hours.  Urine Studies:   Recent Labs   Lab 02/20/24  1552   COLORU Yellow   APPEARANCEUA Clear   PHUR 7.0   SPECGRAV 1.020   PROTEINUA 1+*   GLUCUA Negative   KETONESU Negative   BILIRUBINUA Negative   OCCULTUA 3+*   NITRITE Negative   UROBILINOGEN Negative   LEUKOCYTESUR 3+*   RBCUA >100*   WBCUA 10*   BACTERIA Rare   HYALINECASTS 0       Significant Imaging:  All pertinent imaging results/findings from the past 24 hours have been reviewed.                  Assessment/Plan:     * Left nephrolithiasis   - Also with L renal stone   - Ureteroscopy tomorrow    Left ureteral calculus   - 6mm L prox ureteral stone. Failed outpatient management, stent placed previously.   - Continues to have pain though now well controlled   - Planned for L URS with Dr Valencia tomorrow   - NPO at [facility]   - Hopefully d/c home after procedure        VTE Risk Mitigation (From admission, onward)           Ordered     IP VTE HIGH RISK PATIENT  Once         " 02/20/24 1704                    Skye Chew MD  Urology  [facility] - Emergency Dept (Observation)

## 2024-02-21 NOTE — HOSPITAL COURSE
Mr Dye reports that his pain is controlled after receiving toradol s/p ureteroscopy, cystoscopy, and left stent placement. He is having normal amounts of urine output with intermittent blood tinged urine. He is ambulating around room with no problems. Discussed medications that he will be discharged on and indications. Discussed stent removal and Urology follow up. Patient with be discharged home. Prescriptions to be delivered by pharmacy.  Mr Dye has no concerns or questions at this time.

## 2024-02-22 LAB
ANION GAP SERPL CALC-SCNC: 8 MMOL/L (ref 8–16)
BUN SERPL-MCNC: 15 MG/DL (ref 6–20)
CALCIUM SERPL-MCNC: 8.9 MG/DL (ref 8.7–10.5)
CHLORIDE SERPL-SCNC: 107 MMOL/L (ref 95–110)
CO2 SERPL-SCNC: 22 MMOL/L (ref 23–29)
CREAT SERPL-MCNC: 1.3 MG/DL (ref 0.5–1.4)
ERYTHROCYTE [DISTWIDTH] IN BLOOD BY AUTOMATED COUNT: 13.4 % (ref 11.5–14.5)
EST. GFR  (NO RACE VARIABLE): >60 ML/MIN/1.73 M^2
GLUCOSE SERPL-MCNC: 99 MG/DL (ref 70–110)
HCT VFR BLD AUTO: 37.2 % (ref 40–54)
HGB BLD-MCNC: 13.1 G/DL (ref 14–18)
MCH RBC QN AUTO: 29.2 PG (ref 27–31)
MCHC RBC AUTO-ENTMCNC: 35.2 G/DL (ref 32–36)
MCV RBC AUTO: 83 FL (ref 82–98)
PLATELET # BLD AUTO: 262 K/UL (ref 150–450)
PMV BLD AUTO: 8.8 FL (ref 9.2–12.9)
POTASSIUM SERPL-SCNC: 4.4 MMOL/L (ref 3.5–5.1)
RBC # BLD AUTO: 4.48 M/UL (ref 4.6–6.2)
SODIUM SERPL-SCNC: 137 MMOL/L (ref 136–145)
WBC # BLD AUTO: 5.83 K/UL (ref 3.9–12.7)

## 2024-02-22 PROCEDURE — D9220A PRA ANESTHESIA: Mod: CRNA,,, | Performed by: NURSE ANESTHETIST, CERTIFIED REGISTERED

## 2024-02-22 PROCEDURE — 99900035 HC TECH TIME PER 15 MIN (STAT)

## 2024-02-22 PROCEDURE — 27201423 OPTIME MED/SURG SUP & DEVICES STERILE SUPPLY: Performed by: UROLOGY

## 2024-02-22 PROCEDURE — 63600175 PHARM REV CODE 636 W HCPCS: Performed by: ANESTHESIOLOGY

## 2024-02-22 PROCEDURE — 63600175 PHARM REV CODE 636 W HCPCS: Performed by: PHYSICIAN ASSISTANT

## 2024-02-22 PROCEDURE — 80048 BASIC METABOLIC PNL TOTAL CA: CPT | Performed by: NURSE PRACTITIONER

## 2024-02-22 PROCEDURE — 71000039 HC RECOVERY, EACH ADD'L HOUR: Performed by: UROLOGY

## 2024-02-22 PROCEDURE — 37000008 HC ANESTHESIA 1ST 15 MINUTES: Performed by: UROLOGY

## 2024-02-22 PROCEDURE — C1769 GUIDE WIRE: HCPCS | Performed by: UROLOGY

## 2024-02-22 PROCEDURE — 71000033 HC RECOVERY, INTIAL HOUR: Performed by: UROLOGY

## 2024-02-22 PROCEDURE — 85027 COMPLETE CBC AUTOMATED: CPT | Performed by: NURSE PRACTITIONER

## 2024-02-22 PROCEDURE — D9220A PRA ANESTHESIA: Mod: ANES,,, | Performed by: ANESTHESIOLOGY

## 2024-02-22 PROCEDURE — 25000003 PHARM REV CODE 250: Performed by: UROLOGY

## 2024-02-22 PROCEDURE — G0378 HOSPITAL OBSERVATION PER HR: HCPCS

## 2024-02-22 PROCEDURE — 36415 COLL VENOUS BLD VENIPUNCTURE: CPT | Performed by: NURSE PRACTITIONER

## 2024-02-22 PROCEDURE — 25000003 PHARM REV CODE 250: Performed by: NURSE PRACTITIONER

## 2024-02-22 PROCEDURE — 63600175 PHARM REV CODE 636 W HCPCS: Performed by: UROLOGY

## 2024-02-22 PROCEDURE — 25000003 PHARM REV CODE 250: Performed by: STUDENT IN AN ORGANIZED HEALTH CARE EDUCATION/TRAINING PROGRAM

## 2024-02-22 PROCEDURE — C1758 CATHETER, URETERAL: HCPCS | Performed by: UROLOGY

## 2024-02-22 PROCEDURE — 52356 CYSTO/URETERO W/LITHOTRIPSY: CPT | Mod: LT,,, | Performed by: UROLOGY

## 2024-02-22 PROCEDURE — 36000707: Performed by: UROLOGY

## 2024-02-22 PROCEDURE — 25000003 PHARM REV CODE 250: Performed by: NURSE ANESTHETIST, CERTIFIED REGISTERED

## 2024-02-22 PROCEDURE — 74420 UROGRAPHY RTRGR +-KUB: CPT | Mod: 26,,, | Performed by: UROLOGY

## 2024-02-22 PROCEDURE — 37000009 HC ANESTHESIA EA ADD 15 MINS: Performed by: UROLOGY

## 2024-02-22 PROCEDURE — 63600175 PHARM REV CODE 636 W HCPCS: Performed by: NURSE ANESTHETIST, CERTIFIED REGISTERED

## 2024-02-22 PROCEDURE — 25000003 PHARM REV CODE 250: Performed by: PHYSICIAN ASSISTANT

## 2024-02-22 PROCEDURE — 94761 N-INVAS EAR/PLS OXIMETRY MLT: CPT

## 2024-02-22 PROCEDURE — 25000003 PHARM REV CODE 250: Performed by: ANESTHESIOLOGY

## 2024-02-22 PROCEDURE — 25500020 PHARM REV CODE 255: Performed by: UROLOGY

## 2024-02-22 PROCEDURE — C2617 STENT, NON-COR, TEM W/O DEL: HCPCS | Performed by: UROLOGY

## 2024-02-22 PROCEDURE — 36000706: Performed by: UROLOGY

## 2024-02-22 DEVICE — STENT URETERAL UNIV 6FR 26CM: Type: IMPLANTABLE DEVICE | Site: URETER | Status: FUNCTIONAL

## 2024-02-22 RX ORDER — ROCURONIUM BROMIDE 10 MG/ML
INJECTION, SOLUTION INTRAVENOUS
Status: DISCONTINUED | OUTPATIENT
Start: 2024-02-22 | End: 2024-02-22

## 2024-02-22 RX ORDER — PHENYLEPHRINE HYDROCHLORIDE 10 MG/ML
INJECTION INTRAVENOUS
Status: DISCONTINUED | OUTPATIENT
Start: 2024-02-22 | End: 2024-02-22

## 2024-02-22 RX ORDER — SODIUM CHLORIDE 0.9 % (FLUSH) 0.9 %
3 SYRINGE (ML) INJECTION
Status: DISCONTINUED | OUTPATIENT
Start: 2024-02-22 | End: 2024-02-22 | Stop reason: HOSPADM

## 2024-02-22 RX ORDER — MEPERIDINE HYDROCHLORIDE 25 MG/ML
12.5 INJECTION INTRAMUSCULAR; INTRAVENOUS; SUBCUTANEOUS ONCE AS NEEDED
Status: DISCONTINUED | OUTPATIENT
Start: 2024-02-22 | End: 2024-02-22 | Stop reason: HOSPADM

## 2024-02-22 RX ORDER — DEXAMETHASONE SODIUM PHOSPHATE 4 MG/ML
INJECTION, SOLUTION INTRA-ARTICULAR; INTRALESIONAL; INTRAMUSCULAR; INTRAVENOUS; SOFT TISSUE
Status: DISCONTINUED | OUTPATIENT
Start: 2024-02-22 | End: 2024-02-22

## 2024-02-22 RX ORDER — SODIUM CHLORIDE, SODIUM LACTATE, POTASSIUM CHLORIDE, CALCIUM CHLORIDE 600; 310; 30; 20 MG/100ML; MG/100ML; MG/100ML; MG/100ML
INJECTION, SOLUTION INTRAVENOUS CONTINUOUS
Status: DISCONTINUED | OUTPATIENT
Start: 2024-02-22 | End: 2024-02-22

## 2024-02-22 RX ORDER — PROPOFOL 10 MG/ML
VIAL (ML) INTRAVENOUS
Status: DISCONTINUED | OUTPATIENT
Start: 2024-02-22 | End: 2024-02-22

## 2024-02-22 RX ORDER — LIDOCAINE HYDROCHLORIDE 20 MG/ML
INJECTION INTRAVENOUS
Status: DISCONTINUED | OUTPATIENT
Start: 2024-02-22 | End: 2024-02-22

## 2024-02-22 RX ORDER — ONDANSETRON HYDROCHLORIDE 2 MG/ML
4 INJECTION, SOLUTION INTRAVENOUS DAILY PRN
Status: DISCONTINUED | OUTPATIENT
Start: 2024-02-22 | End: 2024-02-22 | Stop reason: HOSPADM

## 2024-02-22 RX ORDER — LIDOCAINE HYDROCHLORIDE 10 MG/ML
0.5 INJECTION, SOLUTION EPIDURAL; INFILTRATION; INTRACAUDAL; PERINEURAL ONCE
Status: DISCONTINUED | OUTPATIENT
Start: 2024-02-22 | End: 2024-02-22 | Stop reason: HOSPADM

## 2024-02-22 RX ORDER — TAMSULOSIN HYDROCHLORIDE 0.4 MG/1
0.4 CAPSULE ORAL DAILY
Qty: 30 CAPSULE | Refills: 3 | Status: SHIPPED | OUTPATIENT
Start: 2024-02-22 | End: 2024-02-23

## 2024-02-22 RX ORDER — HYDROMORPHONE HYDROCHLORIDE 2 MG/ML
0.4 INJECTION, SOLUTION INTRAMUSCULAR; INTRAVENOUS; SUBCUTANEOUS EVERY 5 MIN PRN
Status: DISCONTINUED | OUTPATIENT
Start: 2024-02-22 | End: 2024-02-22 | Stop reason: HOSPADM

## 2024-02-22 RX ORDER — ONDANSETRON HYDROCHLORIDE 2 MG/ML
INJECTION, SOLUTION INTRAMUSCULAR; INTRAVENOUS
Status: DISCONTINUED | OUTPATIENT
Start: 2024-02-22 | End: 2024-02-22

## 2024-02-22 RX ORDER — OXYBUTYNIN CHLORIDE 5 MG/1
5 TABLET ORAL ONCE
Status: COMPLETED | OUTPATIENT
Start: 2024-02-22 | End: 2024-02-22

## 2024-02-22 RX ORDER — LIDOCAINE HYDROCHLORIDE 20 MG/ML
JELLY TOPICAL ONCE
Status: DISCONTINUED | OUTPATIENT
Start: 2024-02-22 | End: 2024-02-22 | Stop reason: HOSPADM

## 2024-02-22 RX ORDER — FENTANYL CITRATE 50 UG/ML
INJECTION, SOLUTION INTRAMUSCULAR; INTRAVENOUS
Status: DISCONTINUED | OUTPATIENT
Start: 2024-02-22 | End: 2024-02-22

## 2024-02-22 RX ORDER — KETOROLAC TROMETHAMINE 30 MG/ML
INJECTION, SOLUTION INTRAMUSCULAR; INTRAVENOUS
Status: DISCONTINUED | OUTPATIENT
Start: 2024-02-22 | End: 2024-02-22

## 2024-02-22 RX ORDER — OXYCODONE HYDROCHLORIDE 5 MG/1
5 TABLET ORAL
Status: DISCONTINUED | OUTPATIENT
Start: 2024-02-22 | End: 2024-02-22 | Stop reason: HOSPADM

## 2024-02-22 RX ORDER — HYDROCODONE BITARTRATE AND ACETAMINOPHEN 5; 325 MG/1; MG/1
1 TABLET ORAL EVERY 6 HOURS PRN
Status: DISCONTINUED | OUTPATIENT
Start: 2024-02-22 | End: 2024-02-23 | Stop reason: HOSPADM

## 2024-02-22 RX ADMIN — PROPOFOL 30 MG: 10 INJECTION, EMULSION INTRAVENOUS at 01:02

## 2024-02-22 RX ADMIN — HYDROMORPHONE HYDROCHLORIDE 1 MG: 1 INJECTION, SOLUTION INTRAMUSCULAR; INTRAVENOUS; SUBCUTANEOUS at 11:02

## 2024-02-22 RX ADMIN — ACETAMINOPHEN 650 MG: 325 TABLET, FILM COATED ORAL at 02:02

## 2024-02-22 RX ADMIN — FENTANYL CITRATE 100 MCG: 50 INJECTION, SOLUTION INTRAMUSCULAR; INTRAVENOUS at 12:02

## 2024-02-22 RX ADMIN — PROPOFOL 180 MG: 10 INJECTION, EMULSION INTRAVENOUS at 12:02

## 2024-02-22 RX ADMIN — PHENYLEPHRINE HYDROCHLORIDE 200 MCG: 10 INJECTION INTRAVENOUS at 12:02

## 2024-02-22 RX ADMIN — OXYCODONE HYDROCHLORIDE 5 MG: 5 TABLET ORAL at 02:02

## 2024-02-22 RX ADMIN — OXYBUTYNIN CHLORIDE 5 MG: 5 TABLET ORAL at 02:02

## 2024-02-22 RX ADMIN — PHENYLEPHRINE HYDROCHLORIDE 100 MCG: 10 INJECTION INTRAVENOUS at 12:02

## 2024-02-22 RX ADMIN — PROPOFOL 20 MG: 10 INJECTION, EMULSION INTRAVENOUS at 01:02

## 2024-02-22 RX ADMIN — KETOROLAC TROMETHAMINE 10 MG: 30 INJECTION INTRAMUSCULAR; INTRAVENOUS at 04:02

## 2024-02-22 RX ADMIN — SODIUM CHLORIDE, SODIUM LACTATE, POTASSIUM CHLORIDE, AND CALCIUM CHLORIDE: .6; .31; .03; .02 INJECTION, SOLUTION INTRAVENOUS at 01:02

## 2024-02-22 RX ADMIN — HYDROCODONE BITARTRATE AND ACETAMINOPHEN 1 TABLET: 5; 325 TABLET ORAL at 09:02

## 2024-02-22 RX ADMIN — HYDROMORPHONE HYDROCHLORIDE 0.4 MG: 2 INJECTION INTRAMUSCULAR; INTRAVENOUS; SUBCUTANEOUS at 03:02

## 2024-02-22 RX ADMIN — PHENYLEPHRINE HYDROCHLORIDE 200 MCG: 10 INJECTION INTRAVENOUS at 01:02

## 2024-02-22 RX ADMIN — SUGAMMADEX 200 MG: 100 INJECTION, SOLUTION INTRAVENOUS at 01:02

## 2024-02-22 RX ADMIN — ONDANSETRON 4 MG: 2 INJECTION INTRAMUSCULAR; INTRAVENOUS at 01:02

## 2024-02-22 RX ADMIN — SODIUM CHLORIDE, SODIUM LACTATE, POTASSIUM CHLORIDE, AND CALCIUM CHLORIDE: .6; .31; .03; .02 INJECTION, SOLUTION INTRAVENOUS at 11:02

## 2024-02-22 RX ADMIN — HYDROMORPHONE HYDROCHLORIDE 0.4 MG: 2 INJECTION INTRAMUSCULAR; INTRAVENOUS; SUBCUTANEOUS at 02:02

## 2024-02-22 RX ADMIN — ROCURONIUM BROMIDE 50 MG: 10 SOLUTION INTRAVENOUS at 12:02

## 2024-02-22 RX ADMIN — TAMSULOSIN HYDROCHLORIDE 0.4 MG: 0.4 CAPSULE ORAL at 08:02

## 2024-02-22 RX ADMIN — DEXAMETHASONE SODIUM PHOSPHATE 8 MG: 4 INJECTION, SOLUTION INTRAMUSCULAR; INTRAVENOUS at 12:02

## 2024-02-22 RX ADMIN — KETOROLAC TROMETHAMINE 30 MG: 30 INJECTION, SOLUTION INTRAMUSCULAR; INTRAVENOUS at 01:02

## 2024-02-22 RX ADMIN — CEFTRIAXONE SODIUM 1 G: 1 INJECTION, POWDER, FOR SOLUTION INTRAMUSCULAR; INTRAVENOUS at 04:02

## 2024-02-22 RX ADMIN — LIDOCAINE HYDROCHLORIDE 100 MG: 20 INJECTION, SOLUTION INTRAVENOUS at 12:02

## 2024-02-22 NOTE — OP NOTE
[facility]  Operative Note    Date: 02/22/2024    Pre-Op Diagnosis:   Left kidney stones.    Patient Active Problem List    Diagnosis Date Noted    Left ureteral calculus 02/21/2024    Left testicular pain 02/07/2024    Abnormal CT of the abdomen 03/27/2018    Dehydration, mild 02/27/2018    Abdominal pain 02/26/2018    Nausea and vomiting 02/26/2018    Left nephrolithiasis 02/26/2018    Acute right-sided thoracic back pain 08/22/2017       Post-Op Diagnosis: same    Procedure(s) Performed:   1.  Left ureteroscopy  2.  Cystoscopy  3.  Left retrograde pyelogram  4.  Fluoro < 1 hr  5. Left stent placement    Specimen(s): None    Staff Surgeon:  Zach Valencia MD     Assistant Surgeon: Christen Rueda MD    Anesthesia: General endotracheal anesthesia    Indications: Hardik Jimenez is a 48 y.o. male with a left kidney stone, presenting for definitive stone management.  He currently does have a JJ ureteral stent in place.    Findings:   Left ureteroscopy with laser lithotripsy performed.   Stone dusted.   Minuscule laser tip fragment seen in the midpole calyx which was too small to be extracted with basket. All calices, ureter and bladder cleared without evidence of clear laser tip. No fluoroscopic evidence of missed calices or filling defects.      Estimated Blood Loss: min    Drains: 6 Fr x 26 cm JJ ureteral stent with strings    Procedure in detail:  After informed consent was obtained, the patient was brought the the cystoscopy suite and placed in the supine position.  SCDs were applied and working.  Anesthesia was administered.  The patient was then placed in the dorsal lithotomy position and prepped and draped in the usual sterile fashion.      A rigid cystoscope in a 22 Fr sheath was introduced into the patient's urethra.  This passed easily.  The entire urethra was visualized which showed no strictures or masses.  Formal cystoscopy was performed which revealed no masses or lesions suspicious  for malignancy, no bladder stones, no bladder diverticuli, no trabeculations.  The ureteral orifices were visualized in the normal anatomic position bilaterally.      A guide wire was passed up the left ureteral orifice and up into the kidney along side the stent. The stent was then grasped with graspers and pulled to the meatus and a second wire was placed into the kidney..  This passed easily and placement was confirmed using fluoro.     A flexible ureteroscope was passed into the kidney through the ureter under direct vision. The two stones were seen in the midpole.     A 273 micron laser fiber was passed through the ureteroscope.  The stone was fragmented/ dusted using the laser.  All stones were dusted to minute pieces. A retrograde pyelogram was performed through the ureteroscope ensuring that all calices had been evaluated. A small fragment of the laser tip was seen but it was too small to be basket extracted.The ureteroscope was removed keeping the guide wire in place.  The entire course of the ureter was visualized as the ureteroscope and access sheath were simultaneously removed.  There were no significant ureteral fragments left behind. There was no evidence of a retained laser tip fragment.   The bladder was irrigated with Ellik.     A 6 Fr x 26 cm JJ ureteral stent with strings was passed over the wire and up into the renal pelvis using fluoro.  When the coil appeared to be in good position in the kidney, the wire was removed under continuous fluoro.  Good coils were seen in the kidney and the bladder using fluoro.      The patient tolerated the procedure well and was transferred to the recovery room in stable condition.      Disposition:  The patient will follow up with TANA Green in 12 weeks with a renal ultrasound.      Christen Rueda MD

## 2024-02-22 NOTE — ANESTHESIA PREPROCEDURE EVALUATION
02/22/2024  Hardik Jimenez is a 48 y.o., male.      Pre-op Assessment    I have reviewed the Patient Summary Reports.     I have reviewed the Nursing Notes. I have reviewed the NPO Status.   I have reviewed the Medications.     Review of Systems  Anesthesia Hx:  No problems with previous Anesthesia             Denies Family Hx of Anesthesia complications.    Denies Personal Hx of Anesthesia complications.                    Social:  Non-Smoker       Hematology/Oncology:    Oncology Normal    -- Anemia:                                  EENT/Dental:  EENT/Dental Normal           Cardiovascular:  Exercise tolerance: good                                           Pulmonary:    Asthma mild                   Renal/:  Chronic Renal Disease renal calculi               Hepatic/GI:  Hepatic/GI Normal                 Musculoskeletal:  Arthritis               Neurological:    Neuromuscular Disease,                                   Endocrine:  Endocrine Normal            Psych:  Psychiatric Normal                    Physical Exam  General: Well nourished, Cooperative and Alert    Airway:  Mallampati: II   Mouth Opening: Normal  TM Distance: Normal  Tongue: Normal  Neck ROM: Normal ROM    Dental:  Intact        Anesthesia Plan  Type of Anesthesia, risks & benefits discussed:    Anesthesia Type: Gen ETT  Intra-op Monitoring Plan: Standard ASA Monitors  Post Op Pain Control Plan: multimodal analgesia  Induction:  IV  Airway Plan: Video, Post-Induction  Informed Consent: Informed consent signed with the Patient and all parties understand the risks and agree with anesthesia plan.  All questions answered.   ASA Score: 2    Ready For Surgery From Anesthesia Perspective.     .

## 2024-02-22 NOTE — ANESTHESIA POSTPROCEDURE EVALUATION
Anesthesia Post Evaluation    Patient: Hardik Jimenez    Procedure(s) Performed: Procedure(s) (LRB):  URETEROSCOPY (Left)  CYSTOSCOPY, WITH RETROGRADE PYELOGRAM AND URETERAL STENT EXCHANGE (Left)  LITHOTRIPSY, USING LASER (Left)    Final Anesthesia Type: general      Patient location during evaluation: PACU  Patient participation: Yes- Able to Participate  Level of consciousness: awake and alert  Post-procedure vital signs: reviewed and stable  Pain management: adequate  Airway patency: patent    PONV status at discharge: No PONV  Anesthetic complications: no      Cardiovascular status: blood pressure returned to baseline  Respiratory status: unassisted and spontaneous ventilation  Hydration status: euvolemic  Follow-up not needed.              Vitals Value Taken Time   /76 02/22/24 1446   Temp 36.8 °C (98.2 °F) 02/22/24 1411   Pulse 82 02/22/24 1452   Resp 16 02/22/24 1445   SpO2 97 % 02/22/24 1452   Vitals shown include unvalidated device data.      No case tracking events are documented in the log.      Pain/Sal Score: Pain Rating Prior to Med Admin: 8 (2/22/2024  2:48 PM)  Pain Rating Post Med Admin: (S) 0 (2/21/2024  7:18 PM)  Sal Score: 9 (2/22/2024  2:45 PM)

## 2024-02-22 NOTE — PROGRESS NOTES
post op    Stable in PACU  AF VSS  Some flank discomfort  Discussed operative findings with wife and pt    OK to DC home with percocet and flomax and pyridium  He has strings on the stent; Offered to have him see NP on Monday to remove the stent but he prefers to remove this himself

## 2024-02-22 NOTE — DISCHARGE INSTRUCTIONS
Please remove your stent with strings on next Thursday 2/29/2024.     You will see us in clinic in 3 months with a renal ultrasound.

## 2024-02-22 NOTE — SUBJECTIVE & OBJECTIVE
Interval History: Pain 5/10 following urological procedures. Bloody urine in urinal. Patient will be staying overnight for pain management.     Review of Systems   Constitutional:  Negative for activity change and diaphoresis.   Eyes:  Negative for visual disturbance.   Respiratory:  Negative for cough and shortness of breath.    Cardiovascular:  Negative for chest pain.   Gastrointestinal:  Positive for abdominal pain (left side-controlled). Negative for abdominal distention, constipation, nausea and vomiting.   Genitourinary:  Positive for flank pain (left-controlled). Negative for dysuria.     Objective:     Vital Signs (Most Recent):  Temp: 98.4 °F (36.9 °C) (02/22/24 1607)  Pulse: 99 (02/22/24 1718)  Resp: 18 (02/22/24 1718)  BP: 121/76 (02/22/24 1718)  SpO2: 96 % (02/22/24 1607) Vital Signs (24h Range):  Temp:  [97.5 °F (36.4 °C)-98.5 °F (36.9 °C)] 98.4 °F (36.9 °C)  Pulse:  [] 99  Resp:  [16-18] 18  SpO2:  [92 %-100 %] 96 %  BP: (121-132)/(73-81) 121/76     Weight: 97.5 kg (215 lb)  Body mass index is 28.37 kg/m².    Intake/Output Summary (Last 24 hours) at 2/22/2024 1758  Last data filed at 2/22/2024 1755  Gross per 24 hour   Intake 1640 ml   Output 150 ml   Net 1490 ml         Physical Exam  Vitals reviewed.   Cardiovascular:      Rate and Rhythm: Normal rate.   Pulmonary:      Effort: Pulmonary effort is normal. No respiratory distress.   Abdominal:      General: There is no distension.      Palpations: Abdomen is soft.      Tenderness: There is no abdominal tenderness.   Musculoskeletal:      Right lower leg: No edema.      Left lower leg: No edema.   Neurological:      General: No focal deficit present.      Mental Status: He is alert.             Significant Labs: All pertinent labs within the past 24 hours have been reviewed.  BMP:   Recent Labs   Lab 02/22/24  0620   GLU 99      K 4.4      CO2 22*   BUN 15   CREATININE 1.3   CALCIUM 8.9     CBC:   Recent Labs   Lab 02/22/24  0621    WBC 5.83   HGB 13.1*   HCT 37.2*        CMP:   Recent Labs   Lab 02/22/24  0620      K 4.4      CO2 22*   GLU 99   BUN 15   CREATININE 1.3   CALCIUM 8.9   ANIONGAP 8       Significant Imaging: I have reviewed all pertinent imaging results/findings within the past 24 hours.  SURG FL Surgery Fluoro Usage  See OP Notes for results.     IMPRESSION: See OP Notes for results.     This procedure was auto-finalized by: Virtual Radiologist

## 2024-02-22 NOTE — NURSING
1603  Arrived to floor, AAOX4. VSS stable. Diet requested. Cleared for d/c by surgery, however pain 9/10. String to incision site to penis. Tegaderm dressing intact.

## 2024-02-22 NOTE — ANESTHESIA PROCEDURE NOTES
Intubation    Date/Time: 2/22/2024 12:34 PM    Performed by: April Caldera CRNA  Authorized by: Romario Martell MD    Intubation:     Induction:  Intravenous    Intubated:  Postinduction    Mask Ventilation:  Easy mask    Attempts:  1    Attempted By:  CRNA    Method of Intubation:  Video laryngoscopy    Blade:  Redman 3    Laryngeal View Grade: Grade I - full view of cords      Difficult Airway Encountered?: No      Complications:  None    Airway Device:  Oral endotracheal tube    Airway Device Size:  7.5    Style/Cuff Inflation:  Cuffed    Inflation Amount (mL):  4    Tube secured:  23    Secured at:  The lips    Placement Verified By:  Capnometry    Complicating Factors:  None    Findings Post-Intubation:  BS equal bilateral

## 2024-02-23 VITALS
RESPIRATION RATE: 18 BRPM | HEART RATE: 101 BPM | SYSTOLIC BLOOD PRESSURE: 142 MMHG | OXYGEN SATURATION: 97 % | HEIGHT: 73 IN | DIASTOLIC BLOOD PRESSURE: 86 MMHG | WEIGHT: 215 LBS | BODY MASS INDEX: 28.49 KG/M2 | TEMPERATURE: 100 F

## 2024-02-23 LAB
ANION GAP SERPL CALC-SCNC: 8 MMOL/L (ref 8–16)
BACTERIA UR CULT: NO GROWTH
BASOPHILS # BLD AUTO: 0.02 K/UL (ref 0–0.2)
BASOPHILS NFR BLD: 0.2 % (ref 0–1.9)
BUN SERPL-MCNC: 22 MG/DL (ref 6–20)
CALCIUM SERPL-MCNC: 9.1 MG/DL (ref 8.7–10.5)
CHLORIDE SERPL-SCNC: 106 MMOL/L (ref 95–110)
CO2 SERPL-SCNC: 22 MMOL/L (ref 23–29)
CREAT SERPL-MCNC: 1.4 MG/DL (ref 0.5–1.4)
DIFFERENTIAL METHOD BLD: ABNORMAL
EOSINOPHIL # BLD AUTO: 0 K/UL (ref 0–0.5)
EOSINOPHIL NFR BLD: 0.1 % (ref 0–8)
ERYTHROCYTE [DISTWIDTH] IN BLOOD BY AUTOMATED COUNT: 13.5 % (ref 11.5–14.5)
EST. GFR  (NO RACE VARIABLE): >60 ML/MIN/1.73 M^2
GLUCOSE SERPL-MCNC: 114 MG/DL (ref 70–110)
HCT VFR BLD AUTO: 35.6 % (ref 40–54)
HGB BLD-MCNC: 12.7 G/DL (ref 14–18)
IMM GRANULOCYTES # BLD AUTO: 0.04 K/UL (ref 0–0.04)
IMM GRANULOCYTES NFR BLD AUTO: 0.4 % (ref 0–0.5)
LYMPHOCYTES # BLD AUTO: 1.4 K/UL (ref 1–4.8)
LYMPHOCYTES NFR BLD: 13.2 % (ref 18–48)
MCH RBC QN AUTO: 29.6 PG (ref 27–31)
MCHC RBC AUTO-ENTMCNC: 35.7 G/DL (ref 32–36)
MCV RBC AUTO: 83 FL (ref 82–98)
MONOCYTES # BLD AUTO: 1.2 K/UL (ref 0.3–1)
MONOCYTES NFR BLD: 10.8 % (ref 4–15)
NEUTROPHILS # BLD AUTO: 8.1 K/UL (ref 1.8–7.7)
NEUTROPHILS NFR BLD: 75.3 % (ref 38–73)
NRBC BLD-RTO: 0 /100 WBC
PLATELET # BLD AUTO: 262 K/UL (ref 150–450)
PMV BLD AUTO: 9.4 FL (ref 9.2–12.9)
POTASSIUM SERPL-SCNC: 4.4 MMOL/L (ref 3.5–5.1)
RBC # BLD AUTO: 4.29 M/UL (ref 4.6–6.2)
SODIUM SERPL-SCNC: 136 MMOL/L (ref 136–145)
WBC # BLD AUTO: 10.77 K/UL (ref 3.9–12.7)

## 2024-02-23 PROCEDURE — 80048 BASIC METABOLIC PNL TOTAL CA: CPT | Performed by: NURSE PRACTITIONER

## 2024-02-23 PROCEDURE — G0378 HOSPITAL OBSERVATION PER HR: HCPCS

## 2024-02-23 PROCEDURE — 36415 COLL VENOUS BLD VENIPUNCTURE: CPT | Performed by: NURSE PRACTITIONER

## 2024-02-23 PROCEDURE — 85025 COMPLETE CBC W/AUTO DIFF WBC: CPT | Performed by: NURSE PRACTITIONER

## 2024-02-23 PROCEDURE — 25000003 PHARM REV CODE 250: Performed by: PHYSICIAN ASSISTANT

## 2024-02-23 PROCEDURE — 99213 OFFICE O/P EST LOW 20 MIN: CPT | Mod: ,,, | Performed by: UROLOGY

## 2024-02-23 PROCEDURE — 25000003 PHARM REV CODE 250: Performed by: NURSE PRACTITIONER

## 2024-02-23 RX ORDER — OXYCODONE AND ACETAMINOPHEN 5; 325 MG/1; MG/1
1 TABLET ORAL EVERY 8 HOURS PRN
Qty: 9 TABLET | Refills: 0 | Status: SHIPPED | OUTPATIENT
Start: 2024-02-23

## 2024-02-23 RX ORDER — TAMSULOSIN HYDROCHLORIDE 0.4 MG/1
0.4 CAPSULE ORAL DAILY
Qty: 30 CAPSULE | Refills: 1 | Status: SHIPPED | OUTPATIENT
Start: 2024-02-23 | End: 2025-02-22

## 2024-02-23 RX ORDER — PHENAZOPYRIDINE HYDROCHLORIDE 200 MG/1
200 TABLET, FILM COATED ORAL 3 TIMES DAILY PRN
Qty: 9 TABLET | Refills: 0 | Status: SHIPPED | OUTPATIENT
Start: 2024-02-23 | End: 2024-02-26

## 2024-02-23 RX ADMIN — HYDROCODONE BITARTRATE AND ACETAMINOPHEN 1 TABLET: 5; 325 TABLET ORAL at 08:02

## 2024-02-23 RX ADMIN — TAMSULOSIN HYDROCHLORIDE 0.4 MG: 0.4 CAPSULE ORAL at 08:02

## 2024-02-23 RX ADMIN — POLYETHYLENE GLYCOL 3350 17 G: 17 POWDER, FOR SOLUTION ORAL at 08:02

## 2024-02-23 NOTE — PROGRESS NOTES
[facility] - Med Surg ([address])  Urology  Progress Note    Patient Name: Hardik Jimenez  MRN: [id number]  Admission Date: 2/20/2024  Hospital Length of Stay: 0 days  Code Status: Full Code   Attending Provider: Amber Comer MD   Primary Care Physician: Kelvin Pablo DO    Subjective:     HPI:  48male  Left prox 7mm stone and 6mm left renal renal stone  Left stent in place        Scheduled for left ureteroscopy as outpatient on Monday     Returns with pain  Admitted to EDOU     He is not sure if he is on antibiotics  Denies fever  Wishes to have procedure moved up    Interval History:     Feeling much better  Eager to go home    Review of Systems  Objective:     Temp:  [96.7 °F (35.9 °C)-98.7 °F (37.1 °C)] 98.2 °F (36.8 °C)  Pulse:  [] 108  Resp:  [16-18] 18  SpO2:  [97 %-99 %] 97 %  BP: (128-141)/(77-88) 141/88     Body mass index is 28.37 kg/m².           Drains       None                    Physical Exam    Ao*4  resp normal rate' breathing not labored  cv normal rate  Ab nondistended; nontender  Ext no edema  No cva tenderness  Significant Labs:    BMP:  Recent Labs   Lab 02/20/24  1448      K 4.4      CO2 21*   BUN 14   CREATININE 1.2   CALCIUM 9.5       CBC:   Recent Labs   Lab 02/20/24  1448   WBC 6.55   HGB 13.5*   HCT 38.4*          Urine Studies:   Recent Labs   Lab 02/20/24  1552   COLORU Yellow   APPEARANCEUA Clear   PHUR 7.0   SPECGRAV 1.020   PROTEINUA 1+*   GLUCUA Negative   KETONESU Negative   BILIRUBINUA Negative   OCCULTUA 3+*   NITRITE Negative   UROBILINOGEN Negative   LEUKOCYTESUR 3+*   RBCUA >100*   WBCUA 10*   BACTERIA Rare   HYALINECASTS 0       Significant Imaging:  -                  Assessment/Plan:     * Left nephrolithiasis   - Also with L renal stone   - Ureteroscopy tomorrow    Left ureteral calculus  POD1 sp ureteroscopy     Doing well  Stones completely fragmented with laser lithotripsy    OK to DC home with percocet, flomax and pyridium    See   Erik at 1pm Mon 2/26 to remove stent            VTE Risk Mitigation (From admission, onward)           Ordered     Place DAMEON hose  Until discontinued         02/22/24 1406     Place sequential compression device  Until discontinued         02/22/24 1406                    Zach Valencia MD  Urology  [facility] - Med Surg ([address])

## 2024-02-23 NOTE — ASSESSMENT & PLAN NOTE
Hardik Jimenez presents for worsening abdominal pain from his known left proximal 7mm and 6mm renal stone, left stent left in place    - UA shows > 100 RBC, 10 WBC, 3+ leukocyte  - urology consulted, plan for ureteroscopy Thursday 2/22/24  - NSAID and dialudid PRN  - IV CTX started 2/20  - urine culture processing  - s/p left stent placement, left ureteroscopy, cystoscopy, left retrograde pyelogram  - ondansetron and prochlorperazine PRN   - completed 3 days of IV ceftriaxone  - Patient to be discharged on flomax, pyridium, and prn pain medications  - Follow up appointment made for Monday 2/26

## 2024-02-23 NOTE — NURSING
D/C pending fax to pharmacy for medication delivery. Instructions reviewed. Verbalized understanding.

## 2024-02-23 NOTE — PLAN OF CARE
Spoke with patient regarding discharge - follow up appts scheduled with info on AVS - Urology 2/26 - PCP 2/28 - new meds escribed for bedside delivery - wife will provide transportation home     [facility] - Med Surg ([address])  Discharge Final Note    Primary Care Provider: Kelvin Pablo DO    Expected Discharge Date: 2/23/2024    Final Discharge Note (most recent)       Final Note - 02/23/24 1230          Final Note    Assessment Type Final Discharge Note     Anticipated Discharge Disposition Home or Self Care     What phone number can be called within the next 1-3 days to see how you are doing after discharge? [phone]     Hospital Resources/Appts/Education Provided Provided patient/caregiver with written discharge plan information;Appointments scheduled and added to AVS        Post-Acute Status    Discharge Delays Medication Delivery

## 2024-02-23 NOTE — ASSESSMENT & PLAN NOTE
Hardik Jimenez presents for worsening abdominal pain from his known left proximal 7mm and 6mm renal stone, left stent left in place    - UA shows > 100 RBC, 10 WBC, 3+ leukocyte  - urology consulted, plan for ureteroscopy Thursday 2/22/24  - NSAID and dialudid PRN  - IV CTX started 2/20  - urine culture processing  - s/p left stent placement, left ureteroscopy, cystoscopy, left retrograde pyelogram  - ondansetron and prochlorperazine PRN

## 2024-02-23 NOTE — PROGRESS NOTES
[address] Medicine  Progress Note    Patient Name: Hardik Jimenez  MRN: [id number]  Patient Class: OP- Observation   Admission Date: 2/20/2024  Length of Stay: 0 days  Attending Physician: Amber Comer MD  Primary Care Provider: Kelvin Pablo DO        Subjective:     Principal Problem:Left nephrolithiasis        HPI:  Hardik Jimenez is a 48M with known left proximal 7mm and 6mm renal stone with stent in place who presents for worsening abdominal pain. The pain is on his left flank,. History limited during exam as patient is actively vomiting. Improving over course of interview after receiving IV Zofran. Denies difficulty urinating, change in frequency. Initially was scheduled for outpatient ureteroscopy on Monday 2/26, but pain has persisted and been unmanageable at home. The nausea and vomiting began today, he believes secondary to medications. Appreciate urology consult, plan for inpatient ureteroscopy on 2/22.     Overview/Hospital Course:  Mr Dye reports that his pain is controlled after receiving toradol s/p ureteroscopy, cystoscopy, and left stent placement.     Interval History: Pain 5/10 following urological procedures. Bloody urine in urinal. Patient will be staying overnight for pain management.     Review of Systems   Constitutional:  Negative for activity change and diaphoresis.   Eyes:  Negative for visual disturbance.   Respiratory:  Negative for cough and shortness of breath.    Cardiovascular:  Negative for chest pain.   Gastrointestinal:  Positive for abdominal pain (left side-controlled). Negative for abdominal distention, constipation, nausea and vomiting.   Genitourinary:  Positive for flank pain (left-controlled). Negative for dysuria.     Objective:     Vital Signs (Most Recent):  Temp: 98.4 °F (36.9 °C) (02/22/24 1607)  Pulse: 99 (02/22/24 1718)  Resp: 18 (02/22/24 1718)  BP: 121/76 (02/22/24 1718)  SpO2: 96 % (02/22/24 1607) Vital Signs (24h  Range):  Temp:  [97.5 °F (36.4 °C)-98.5 °F (36.9 °C)] 98.4 °F (36.9 °C)  Pulse:  [] 99  Resp:  [16-18] 18  SpO2:  [92 %-100 %] 96 %  BP: (121-132)/(73-81) 121/76     Weight: 97.5 kg (215 lb)  Body mass index is 28.37 kg/m².    Intake/Output Summary (Last 24 hours) at 2/22/2024 1758  Last data filed at 2/22/2024 1755  Gross per 24 hour   Intake 1640 ml   Output 150 ml   Net 1490 ml         Physical Exam  Vitals reviewed.   Cardiovascular:      Rate and Rhythm: Normal rate.   Pulmonary:      Effort: Pulmonary effort is normal. No respiratory distress.   Abdominal:      General: There is no distension.      Palpations: Abdomen is soft.      Tenderness: There is no abdominal tenderness.   Musculoskeletal:      Right lower leg: No edema.      Left lower leg: No edema.   Neurological:      General: No focal deficit present.      Mental Status: He is alert.             Significant Labs: All pertinent labs within the past 24 hours have been reviewed.  BMP:   Recent Labs   Lab 02/22/24  0620   GLU 99      K 4.4      CO2 22*   BUN 15   CREATININE 1.3   CALCIUM 8.9     CBC:   Recent Labs   Lab 02/22/24  0621   WBC 5.83   HGB 13.1*   HCT 37.2*        CMP:   Recent Labs   Lab 02/22/24  0620      K 4.4      CO2 22*   GLU 99   BUN 15   CREATININE 1.3   CALCIUM 8.9   ANIONGAP 8       Significant Imaging: I have reviewed all pertinent imaging results/findings within the past 24 hours.  SURG FL Surgery Fluoro Usage  See OP Notes for results.     IMPRESSION: See OP Notes for results.     This procedure was auto-finalized by: Virtual Radiologist        Assessment/Plan:      * Left nephrolithiasis  Hardik Jimenez presents for worsening abdominal pain from his known left proximal 7mm and 6mm renal stone, left stent left in place    - UA shows > 100 RBC, 10 WBC, 3+ leukocyte  - urology consulted, plan for ureteroscopy Thursday 2/22/24  - NSAID and dialudid PRN  - IV CTX started 2/20  - urine culture  processing  - s/p left stent placement, left ureteroscopy, cystoscopy, left retrograde pyelogram  - ondansetron and prochlorperazine PRN         VTE Risk Mitigation (From admission, onward)           Ordered     Place DAMEON hose  Until discontinued         02/22/24 1406     Place sequential compression device  Until discontinued         02/22/24 1406                    Discharge Planning   CHRISTOFER: 2/22/2024     Code Status: Full Code   Is the patient medically ready for discharge?:     Reason for patient still in hospital (select all that apply): Patient trending condition  Discharge Plan A: Home, Home with family                  Justyna Magdaleno DNP  Department of Hospital Medicine   [facility] ([address])

## 2024-02-26 ENCOUNTER — OFFICE VISIT (OUTPATIENT)
Dept: UROLOGY | Facility: CLINIC | Age: 49
End: 2024-02-26
Payer: COMMERCIAL

## 2024-02-26 VITALS
DIASTOLIC BLOOD PRESSURE: 88 MMHG | BODY MASS INDEX: 28.73 KG/M2 | RESPIRATION RATE: 16 BRPM | WEIGHT: 216.81 LBS | SYSTOLIC BLOOD PRESSURE: 124 MMHG | HEIGHT: 73 IN | HEART RATE: 72 BPM | OXYGEN SATURATION: 98 %

## 2024-02-26 DIAGNOSIS — N20.0 NEPHROLITHIASIS: Primary | ICD-10-CM

## 2024-02-26 PROCEDURE — 1159F MED LIST DOCD IN RCRD: CPT | Mod: CPTII,S$GLB,, | Performed by: UROLOGY

## 2024-02-26 PROCEDURE — 3008F BODY MASS INDEX DOCD: CPT | Mod: CPTII,S$GLB,, | Performed by: UROLOGY

## 2024-02-26 PROCEDURE — 99213 OFFICE O/P EST LOW 20 MIN: CPT | Mod: S$GLB,,, | Performed by: UROLOGY

## 2024-02-26 PROCEDURE — 3079F DIAST BP 80-89 MM HG: CPT | Mod: CPTII,S$GLB,, | Performed by: UROLOGY

## 2024-02-26 PROCEDURE — 3074F SYST BP LT 130 MM HG: CPT | Mod: CPTII,S$GLB,, | Performed by: UROLOGY

## 2024-02-26 NOTE — PROGRESS NOTES
Subjective:      Hardik Jimenez is a 48 y.o. male who returns today regarding his     Here for stent removal   Pod4 sp ureteroscopy    .    The following portions of the patient's history were reviewed and updated as appropriate: allergies, current medications, past family history, past medical history, past social history, past surgical history and problem list.    Review of Systems  A comprehensive multipoint review of systems was negative except as otherwise stated in the HPI.    Past Medical History:   Diagnosis Date    Arthritis     Asthma     Kidney stones     Renal disorder     Kidney stones    Sciatica      Past Surgical History:   Procedure Laterality Date    COLONOSCOPY N/A 3/27/2018    Procedure: COLONOSCOPY;  Surgeon: Sara Rios MD;  Location: [facility];  Service: Endoscopy;  Laterality: N/A;    CYSTOSCOPY W/ URETERAL STENT PLACEMENT Left 2/7/2024    Procedure: CYSTOSCOPY, WITH URETERAL STENT INSERTION;  Surgeon: Skye Chew MD;  Location: [facility];  Service: Urology;  Laterality: Left;    CYSTOSCOPY WITH URETEROSCOPY, RETROGRADE PYELOGRAPHY, AND INSERTION OF STENT Left 2/22/2024    Procedure: CYSTOSCOPY, WITH RETROGRADE PYELOGRAM AND URETERAL STENT EXCHANGE;  Surgeon: Zach Valencia MD;  Location: [facility];  Service: Urology;  Laterality: Left;    LASER LITHOTRIPSY Left 2/22/2024    Procedure: LITHOTRIPSY, USING LASER;  Surgeon: Zach Valencia MD;  Location: [facility];  Service: Urology;  Laterality: Left;    URETEROSCOPY Left 2/22/2024    Procedure: URETEROSCOPY;  Surgeon: Zach Valencia MD;  Location: [facility];  Service: Urology;  Laterality: Left;       Review of patient's allergies indicates:  No Known Allergies       Objective:   Vitals: There were no vitals taken for this visit.    Physical Exam   General: alert and oriented, no acute distress  Respiratory: clear to auscultation and Symmetric expansion, non-labored breathing  Cardiovascular: no peripheral edema  Abdomen: soft,  non distended  Skin: normal coloration and turgor, no rashes, no suspicious skin lesions noted  Neuro: no gross deficits  Psych: normal judgment and insight, normal mood/affect, and non-anxious    Physical Exam    Lab Review   Urinalysis demonstrates no specimen    Lab Results   Component Value Date    WBC 10.77 02/23/2024    HGB 12.7 (L) 02/23/2024    HCT 35.6 (L) 02/23/2024    MCV 83 02/23/2024     02/23/2024     Lab Results   Component Value Date    CREATININE 1.4 02/23/2024    BUN 22 (H) 02/23/2024       Imaging  -  Post ureteroscopy film  Stent good position  No radiopaque stones or FBs      Assessment and Plan:   Nephrolithiasis      Sp ureteroscopy; all stones completely fragmented  RTC 6 weeks with KUB and renal US to see Dr Valencia  Stent removal with strings today without difficulty

## 2024-02-27 ENCOUNTER — LAB VISIT (OUTPATIENT)
Dept: LAB | Facility: HOSPITAL | Age: 49
End: 2024-02-27
Attending: NURSE PRACTITIONER
Payer: COMMERCIAL

## 2024-02-27 ENCOUNTER — NURSE TRIAGE (OUTPATIENT)
Dept: ADMINISTRATIVE | Facility: CLINIC | Age: 49
End: 2024-02-27
Payer: COMMERCIAL

## 2024-02-27 DIAGNOSIS — N20.0 NEPHROLITHIASIS: ICD-10-CM

## 2024-02-27 PROCEDURE — 87086 URINE CULTURE/COLONY COUNT: CPT | Performed by: NURSE PRACTITIONER

## 2024-02-27 RX ORDER — CIPROFLOXACIN 500 MG/1
500 TABLET ORAL 2 TIMES DAILY
Qty: 14 TABLET | Refills: 0 | Status: SHIPPED | OUTPATIENT
Start: 2024-02-27 | End: 2024-03-05

## 2024-02-27 NOTE — TELEPHONE ENCOUNTER
Patient reports dysuria, a stream of dark red hematuria and clots after urinating. Denies fever (says he felt warm but wife took his temp and was normal) and flank pain/abdominal pain. Discussed w/Lesley- patient will give UC, start antibiotics and reach out if he was not feeling better. ER precautions reviewed.

## 2024-02-27 NOTE — TELEPHONE ENCOUNTER
Patient had a stent removed 4 days ago. He c/o straining to urinate, dysuria, and a stream of blood that follows urine stream. Per protocol advised patient to speak with his surgeon. Will route message.  Advised the patient to call back with any further questions or if symptoms worsen.        Best contact number is [phone]      Reason for Disposition   Caller has URGENT question and triager unable to answer question    Additional Information   Negative: Sounds like a life-threatening emergency to the triager   Negative: Bright red, wide-spread, sunburn-like rash   Negative: SEVERE headache and after spinal (epidural) anesthesia   Negative: Vomiting and persists > 4 hours   Negative: Vomiting and abdomen looks much more swollen than usual   Negative: Drinking very little and dehydration suspected (e.g., no urine > 12 hours, very dry mouth, very lightheaded)   Negative: Patient sounds very sick or weak to the triager   Negative: Sounds like a serious complication to the triager   Negative: Fever > 100.4 F (38.0 C)    Protocols used: Post-Op Symptoms and [id number]

## 2024-02-28 LAB — BACTERIA UR CULT: NO GROWTH

## 2024-03-15 NOTE — ED NOTES
Assumed care of pt at this time report received from CATALINA Dey   I spoke with Ms Deal. I was calling to schedule ct scan ordered by Dr Briggs per message from Dr Briggs. Ct Scheduled at [facility] Monday afternoon at 3:40pm. Trying to reach a nurse navigator at [facility] for patient to have ct while she is seeing Dr Sacnhez on Monday morning.  Ct scan r/s to [facility] on Monday at 8am. I spoke with Ms Deal to notify her of the change. She verbalized understanding.

## 2024-05-17 ENCOUNTER — HOSPITAL ENCOUNTER (OUTPATIENT)
Dept: RADIOLOGY | Facility: OTHER | Age: 49
Discharge: HOME | End: 2024-05-17
Attending: INTERNAL MEDICINE
Payer: COMMERCIAL

## 2024-05-17 DIAGNOSIS — N20.1 URETERAL CALCULUS, LEFT: ICD-10-CM

## 2024-05-17 PROCEDURE — 76775 US EXAM ABDO BACK WALL LIM: CPT | Mod: 26,,, | Performed by: RADIOLOGY

## 2024-05-17 PROCEDURE — 76775 US EXAM ABDO BACK WALL LIM: CPT | Mod: TC

## 2024-05-20 ENCOUNTER — TELEPHONE (OUTPATIENT)
Dept: UROLOGY | Facility: CLINIC | Age: 49
End: 2024-05-20
Payer: COMMERCIAL

## 2024-05-24 ENCOUNTER — OFFICE VISIT (OUTPATIENT)
Dept: UROLOGY | Facility: CLINIC | Age: 49
End: 2024-05-24
Payer: COMMERCIAL

## 2024-05-24 VITALS — HEIGHT: 73 IN | BODY MASS INDEX: 28.75 KG/M2 | WEIGHT: 216.94 LBS

## 2024-05-24 DIAGNOSIS — N20.0 NEPHROLITHIASIS: Primary | ICD-10-CM

## 2024-05-24 DIAGNOSIS — N48.89 PENILE PAIN: ICD-10-CM

## 2024-05-24 PROCEDURE — 87086 URINE CULTURE/COLONY COUNT: CPT | Performed by: NURSE PRACTITIONER

## 2024-05-24 PROCEDURE — 1160F RVW MEDS BY RX/DR IN RCRD: CPT | Mod: CPTII,S$GLB,, | Performed by: NURSE PRACTITIONER

## 2024-05-24 PROCEDURE — 3008F BODY MASS INDEX DOCD: CPT | Mod: CPTII,S$GLB,, | Performed by: NURSE PRACTITIONER

## 2024-05-24 PROCEDURE — 99213 OFFICE O/P EST LOW 20 MIN: CPT | Mod: S$GLB,,, | Performed by: NURSE PRACTITIONER

## 2024-05-24 PROCEDURE — 1159F MED LIST DOCD IN RCRD: CPT | Mod: CPTII,S$GLB,, | Performed by: NURSE PRACTITIONER

## 2024-05-24 NOTE — PROGRESS NOTES
Subjective:      Hardik Jimenez is a 48 y.o. male who returns today regarding his nephrolithiasis.     The patient is s/p L URS with LL and stent placement with Dr. Valencia on 2/23/24. Stent removed in clinic.    Returns today with AIDEN.   Doing great. Reports occasional R flank discomfort. Reports a single episode of penile pain yesterday- resolved. Denies dysuria, frequency, urgency, gross hematuria and fever/chills. Denies penile discharge and potential STD exposure.  Admits to poor water intake.     The following portions of the patient's history were reviewed and updated as appropriate: allergies, current medications, past family history, past medical history, past social history, past surgical history and problem list.    Review of Systems  Constitutional: no fever or chills  ENT: no nasal congestion or sore throat  Respiratory: no cough or shortness of breath  Cardiovascular: no chest pain or palpitations  Gastrointestinal: no nausea or vomiting, tolerating diet  Genitourinary: as per HPI  Hematologic/Lymphatic: no easy bruising or lymphadenopathy  Musculoskeletal: no arthralgias or myalgias  Neurological: no seizures or tremors  Behavioral/Psych: no auditory or visual hallucinations     Objective:   Vitals: There were no vitals filed for this visit.    Physical Exam   General: alert and oriented, no acute distress  Head: normocephalic, atraumatic  Neck: supple, normal ROM  Respiratory: Symmetric expansion, non-labored breathing  Cardiovascular: regular rate and rhythm  Abdomen: soft, non tender, non distended  Genitourinary: deferred  Skin: normal coloration and turgor, no rashes, no suspicious skin lesions noted  Neuro: alert and oriented x3, no gross deficits  Psych: normal judgment and insight, normal mood/affect, and non-anxious    Lab Review   Urinalysis demonstrates : no specimen  Lab Results   Component Value Date    WBC 10.77 02/23/2024    HGB 12.7 (L) 02/23/2024    HCT 35.6 (L) 02/23/2024    MCV 83  "02/23/2024     02/23/2024     Lab Results   Component Value Date    CREATININE 1.4 02/23/2024    BUN 22 (H) 02/23/2024     No results found for: "PSA"  Imaging   (all images personally reviewed; agree with report below)  AIDEN- "Right kidney: The right kidney measures 10.6 cm. No cortical thinning. No loss of corticomedullary distinction. Resistive index measures 0.62.  No mass. No renal stone. No hydronephrosis.  Left kidney: The left kidney measures 10.6 cm. No cortical thinning. No loss of corticomedullary distinction. Resistive index measures 0.55.  No mass. No renal stone. No hydronephrosis.  Impression:  Unremarkable renal ultrasound with no evidence for obstructive uropathy."    Assessment:     1. Nephrolithiasis    2. Penile pain      Plan:   Hardik was seen today for follow-up.    Diagnoses and all orders for this visit:    Nephrolithiasis  -     X-Ray KUB; Future    Penile pain  -     Urine culture    Plan:  --Discussed US findings - no stones or hydro   --Recommend general stone preventive measures, to include increased hydration, low Na diet, and increased citrus intake  --Urine culture   --Follow up with KUB in 1 year       "

## 2024-05-25 LAB — BACTERIA UR CULT: NO GROWTH

## 2025-04-29 ENCOUNTER — OFFICE VISIT (OUTPATIENT)
Dept: INTERNAL MEDICINE | Facility: CLINIC | Age: 50
End: 2025-04-29
Payer: COMMERCIAL

## 2025-04-29 VITALS
TEMPERATURE: 98 F | RESPIRATION RATE: 16 BRPM | BODY MASS INDEX: 29.51 KG/M2 | SYSTOLIC BLOOD PRESSURE: 126 MMHG | HEART RATE: 62 BPM | DIASTOLIC BLOOD PRESSURE: 82 MMHG | OXYGEN SATURATION: 98 % | HEIGHT: 73 IN | WEIGHT: 222.69 LBS

## 2025-04-29 DIAGNOSIS — H53.8 BLURRY VISION: ICD-10-CM

## 2025-04-29 DIAGNOSIS — G44.209 TENSION HEADACHE: ICD-10-CM

## 2025-04-29 DIAGNOSIS — R06.83 SNORING: ICD-10-CM

## 2025-04-29 DIAGNOSIS — R51.9 CHRONIC NONINTRACTABLE HEADACHE, UNSPECIFIED HEADACHE TYPE: Primary | ICD-10-CM

## 2025-04-29 DIAGNOSIS — G44.86 CERVICOGENIC HEADACHE: ICD-10-CM

## 2025-04-29 DIAGNOSIS — Z00.00 ANNUAL PHYSICAL EXAM: ICD-10-CM

## 2025-04-29 DIAGNOSIS — G89.29 CHRONIC NONINTRACTABLE HEADACHE, UNSPECIFIED HEADACHE TYPE: Primary | ICD-10-CM

## 2025-04-29 PROCEDURE — 99214 OFFICE O/P EST MOD 30 MIN: CPT | Mod: S$GLB,,, | Performed by: INTERNAL MEDICINE

## 2025-04-29 PROCEDURE — 1159F MED LIST DOCD IN RCRD: CPT | Mod: CPTII,S$GLB,, | Performed by: INTERNAL MEDICINE

## 2025-04-29 PROCEDURE — 3008F BODY MASS INDEX DOCD: CPT | Mod: CPTII,S$GLB,, | Performed by: INTERNAL MEDICINE

## 2025-04-29 PROCEDURE — 3074F SYST BP LT 130 MM HG: CPT | Mod: CPTII,S$GLB,, | Performed by: INTERNAL MEDICINE

## 2025-04-29 PROCEDURE — 3079F DIAST BP 80-89 MM HG: CPT | Mod: CPTII,S$GLB,, | Performed by: INTERNAL MEDICINE

## 2025-04-29 PROCEDURE — 99999 PR PBB SHADOW E&M-EST. PATIENT-LVL V: CPT | Mod: PBBFAC,,, | Performed by: INTERNAL MEDICINE

## 2025-04-29 RX ORDER — BUTALBITAL, ACETAMINOPHEN AND CAFFEINE 50; 325; 40 MG/1; MG/1; MG/1
1 TABLET ORAL EVERY 4 HOURS PRN
Qty: 45 TABLET | Refills: 1 | Status: SHIPPED | OUTPATIENT
Start: 2025-04-29 | End: 2025-05-29
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
## 2025-05-03 ENCOUNTER — HOSPITAL ENCOUNTER (OUTPATIENT)
Dept: RADIOLOGY | Facility: OTHER | Age: 50
Discharge: HOME | End: 2025-05-03
Attending: INTERNAL MEDICINE
Payer: COMMERCIAL

## 2025-05-03 DIAGNOSIS — G89.29 CHRONIC NONINTRACTABLE HEADACHE, UNSPECIFIED HEADACHE TYPE: ICD-10-CM

## 2025-05-03 DIAGNOSIS — R51.9 CHRONIC NONINTRACTABLE HEADACHE, UNSPECIFIED HEADACHE TYPE: ICD-10-CM

## 2025-05-03 PROCEDURE — 70551 MRI BRAIN STEM W/O DYE: CPT | Mod: 26,,, | Performed by: RADIOLOGY

## 2025-05-03 PROCEDURE — 70551 MRI BRAIN STEM W/O DYE: CPT | Mod: TC

## 2025-05-05 ENCOUNTER — RESULTS FOLLOW-UP (OUTPATIENT)
Dept: INTERNAL MEDICINE | Facility: CLINIC | Age: 50
End: 2025-05-05

## 2025-05-23 ENCOUNTER — LAB VISIT (OUTPATIENT)
Dept: LAB | Facility: OTHER | Age: 50
End: 2025-05-23
Payer: COMMERCIAL

## 2025-05-23 DIAGNOSIS — Z00.00 ANNUAL PHYSICAL EXAM: ICD-10-CM

## 2025-05-23 LAB
ABSOLUTE EOSINOPHIL (OHS): 0.12 K/UL
ABSOLUTE MONOCYTE (OHS): 0.73 K/UL (ref 0.3–1)
ABSOLUTE NEUTROPHIL COUNT (OHS): 3.08 K/UL (ref 1.8–7.7)
ALBUMIN SERPL BCP-MCNC: 3.9 G/DL (ref 3.5–5.2)
ALP SERPL-CCNC: 85 UNIT/L (ref 40–150)
ALT SERPL W/O P-5'-P-CCNC: 24 UNIT/L (ref 10–44)
ANION GAP (OHS): 5 MMOL/L (ref 8–16)
AST SERPL-CCNC: 25 UNIT/L (ref 11–45)
BASOPHILS # BLD AUTO: 0.03 K/UL
BASOPHILS NFR BLD AUTO: 0.5 %
BILIRUB SERPL-MCNC: 0.4 MG/DL (ref 0.1–1)
BUN SERPL-MCNC: 14 MG/DL (ref 6–20)
CALCIUM SERPL-MCNC: 9.2 MG/DL (ref 8.7–10.5)
CHLORIDE SERPL-SCNC: 109 MMOL/L (ref 95–110)
CO2 SERPL-SCNC: 26 MMOL/L (ref 23–29)
CREAT SERPL-MCNC: 1.4 MG/DL (ref 0.5–1.4)
ERYTHROCYTE [DISTWIDTH] IN BLOOD BY AUTOMATED COUNT: 13.5 % (ref 11.5–14.5)
GFR SERPLBLD CREATININE-BSD FMLA CKD-EPI: >60 ML/MIN/1.73/M2
GLUCOSE SERPL-MCNC: 95 MG/DL (ref 70–110)
HCT VFR BLD AUTO: 41.4 % (ref 40–54)
HGB BLD-MCNC: 14.3 GM/DL (ref 14–18)
IMM GRANULOCYTES # BLD AUTO: 0.04 K/UL (ref 0–0.04)
IMM GRANULOCYTES NFR BLD AUTO: 0.7 % (ref 0–0.5)
LYMPHOCYTES # BLD AUTO: 1.72 K/UL (ref 1–4.8)
MAGNESIUM SERPL-MCNC: 2.1 MG/DL (ref 1.6–2.6)
MCH RBC QN AUTO: 29 PG (ref 27–31)
MCHC RBC AUTO-ENTMCNC: 34.5 G/DL (ref 32–36)
MCV RBC AUTO: 84 FL (ref 82–98)
NUCLEATED RBC (/100WBC) (OHS): 0 /100 WBC
PLATELET # BLD AUTO: 232 K/UL (ref 150–450)
PMV BLD AUTO: 9.1 FL (ref 9.2–12.9)
POTASSIUM SERPL-SCNC: 4.7 MMOL/L (ref 3.5–5.1)
PROT SERPL-MCNC: 7.9 GM/DL (ref 6–8.4)
RBC # BLD AUTO: 4.93 M/UL (ref 4.6–6.2)
RELATIVE EOSINOPHIL (OHS): 2.1 %
RELATIVE LYMPHOCYTE (OHS): 30.1 % (ref 18–48)
RELATIVE MONOCYTE (OHS): 12.8 % (ref 4–15)
RELATIVE NEUTROPHIL (OHS): 53.8 % (ref 38–73)
SODIUM SERPL-SCNC: 140 MMOL/L (ref 136–145)
TSH SERPL-ACNC: 2.13 UIU/ML (ref 0.4–4)
WBC # BLD AUTO: 5.72 K/UL (ref 3.9–12.7)

## 2025-05-23 PROCEDURE — 84153 ASSAY OF PSA TOTAL: CPT

## 2025-05-23 PROCEDURE — 83735 ASSAY OF MAGNESIUM: CPT

## 2025-05-23 PROCEDURE — 83036 HEMOGLOBIN GLYCOSYLATED A1C: CPT

## 2025-05-23 PROCEDURE — 84443 ASSAY THYROID STIM HORMONE: CPT

## 2025-05-23 PROCEDURE — 85025 COMPLETE CBC W/AUTO DIFF WBC: CPT

## 2025-05-23 PROCEDURE — 36415 COLL VENOUS BLD VENIPUNCTURE: CPT

## 2025-05-23 PROCEDURE — 84075 ASSAY ALKALINE PHOSPHATASE: CPT

## 2025-05-24 ENCOUNTER — HOSPITAL ENCOUNTER (OUTPATIENT)
Dept: RADIOLOGY | Facility: OTHER | Age: 50
Discharge: HOME | End: 2025-05-24
Attending: NURSE PRACTITIONER
Payer: COMMERCIAL

## 2025-05-24 DIAGNOSIS — N20.0 NEPHROLITHIASIS: ICD-10-CM

## 2025-05-24 LAB
EAG (OHS): 114 MG/DL (ref 68–131)
HBA1C MFR BLD: 5.6 % (ref 4–5.6)
PSA SERPL-MCNC: 1.03 NG/ML

## 2025-05-24 PROCEDURE — 74018 RADEX ABDOMEN 1 VIEW: CPT | Mod: 26,,, | Performed by: STUDENT IN AN ORGANIZED HEALTH CARE EDUCATION/TRAINING PROGRAM

## 2025-05-24 PROCEDURE — 74018 RADEX ABDOMEN 1 VIEW: CPT | Mod: TC,FY

## 2025-05-26 ENCOUNTER — OFFICE VISIT (OUTPATIENT)
Dept: INTERNAL MEDICINE | Facility: CLINIC | Age: 50
End: 2025-05-26
Payer: COMMERCIAL

## 2025-05-26 ENCOUNTER — RESULTS FOLLOW-UP (OUTPATIENT)
Dept: INTERNAL MEDICINE | Facility: CLINIC | Age: 50
End: 2025-05-26

## 2025-05-26 ENCOUNTER — RESULTS FOLLOW-UP (OUTPATIENT)
Dept: UROLOGY | Facility: CLINIC | Age: 50
End: 2025-05-26

## 2025-05-26 ENCOUNTER — HOSPITAL ENCOUNTER (OUTPATIENT)
Dept: RADIOLOGY | Facility: HOSPITAL | Age: 50
Discharge: HOME | End: 2025-05-26
Attending: INTERNAL MEDICINE
Payer: COMMERCIAL

## 2025-05-26 VITALS
HEART RATE: 76 BPM | OXYGEN SATURATION: 97 % | HEIGHT: 73 IN | RESPIRATION RATE: 18 BRPM | SYSTOLIC BLOOD PRESSURE: 118 MMHG | DIASTOLIC BLOOD PRESSURE: 80 MMHG | WEIGHT: 221.25 LBS | BODY MASS INDEX: 29.32 KG/M2

## 2025-05-26 DIAGNOSIS — M79.644 FINGER PAIN, RIGHT: ICD-10-CM

## 2025-05-26 DIAGNOSIS — Z00.00 ANNUAL PHYSICAL EXAM: Primary | ICD-10-CM

## 2025-05-26 DIAGNOSIS — Z87.442 HISTORY OF NEPHROLITHIASIS: ICD-10-CM

## 2025-05-26 PROCEDURE — 73140 X-RAY EXAM OF FINGER(S): CPT | Mod: 26,RT,, | Performed by: RADIOLOGY

## 2025-05-26 PROCEDURE — 99999 PR PBB SHADOW E&M-EST. PATIENT-LVL IV: CPT | Mod: PBBFAC,,, | Performed by: INTERNAL MEDICINE

## 2025-05-26 PROCEDURE — 99396 PREV VISIT EST AGE 40-64: CPT | Mod: S$GLB,,, | Performed by: INTERNAL MEDICINE

## 2025-05-26 PROCEDURE — 3074F SYST BP LT 130 MM HG: CPT | Mod: CPTII,S$GLB,, | Performed by: INTERNAL MEDICINE

## 2025-05-26 PROCEDURE — 3044F HG A1C LEVEL LT 7.0%: CPT | Mod: CPTII,S$GLB,, | Performed by: INTERNAL MEDICINE

## 2025-05-26 PROCEDURE — 3008F BODY MASS INDEX DOCD: CPT | Mod: CPTII,S$GLB,, | Performed by: INTERNAL MEDICINE

## 2025-05-26 PROCEDURE — 3079F DIAST BP 80-89 MM HG: CPT | Mod: CPTII,S$GLB,, | Performed by: INTERNAL MEDICINE

## 2025-05-26 PROCEDURE — 73140 X-RAY EXAM OF FINGER(S): CPT | Mod: TC,PO,RT

## 2025-05-26 PROCEDURE — 1159F MED LIST DOCD IN RCRD: CPT | Mod: CPTII,S$GLB,, | Performed by: INTERNAL MEDICINE

## 2025-05-26 NOTE — PROGRESS NOTES
Subjective     Patient ID: Hardik Jimenze is a 49 y.o. male.    Chief Complaint: Annual Exam    HPI  49 y.o. Male here for annual exam.     Vaccines: Influenza (declined); Tetanus (declined); Shingrix (will consider)  Eye exam: scheduled  Colonoscopy: 3/18- repeat in 10 yrs    Exercise: no  Diet: regular    Past Medical History:  No date: Arthritis  No date: Asthma  No date: Kidney stones  No date: Renal disorder      Comment:  Kidney stones  No date: Sciatica  Past Surgical History:  3/27/2018: COLONOSCOPY; N/A      Comment:  Procedure: COLONOSCOPY;  Surgeon: Sara Rios MD;                 Location: [facility];  Service: Endoscopy;  Laterality:                N/A;  2/7/2024: CYSTOSCOPY W/ URETERAL STENT PLACEMENT; Left      Comment:  Procedure: CYSTOSCOPY, WITH URETERAL STENT INSERTION;                 Surgeon: Skye Chew MD;  Location: [facility];                 Service: Urology;  Laterality: Left;  2/22/2024: CYSTOSCOPY WITH URETEROSCOPY, RETROGRADE PYELOGRAPHY, AND   INSERTION OF STENT; Left      Comment:  Procedure: CYSTOSCOPY, WITH RETROGRADE PYELOGRAM AND                URETERAL STENT EXCHANGE;  Surgeon: Zach Valencia MD;                Location: [facility];  Service: Urology;  Laterality: Left;  2/22/2024: LASER LITHOTRIPSY; Left      Comment:  Procedure: LITHOTRIPSY, USING LASER;  Surgeon: Zach Valencia MD;  Location: [facility];  Service: Urology;                 Laterality: Left;  2/22/2024: URETEROSCOPY; Left      Comment:  Procedure: URETEROSCOPY;  Surgeon: Zach Valencia MD;               Location: [facility];  Service: Urology;  Laterality: Left;  Social History    Socioeconomic History      Marital status:     Tobacco Use      Smoking status: Never      Smokeless tobacco: Never    Substance and Sexual Activity      Alcohol use: Yes        Comment: occasionally.      Drug use: Yes        Types: Marijuana, Oxycodone      Sexual activity: Yes        Partners:  Female    Review of patient's allergies indicates:  No Known Allergies  Hardik Tony had no medications administered during this visit.  Review of Systems   Constitutional:  Negative for activity change, appetite change, chills, diaphoresis, fatigue, fever and unexpected weight change.   HENT:  Negative for nasal congestion, mouth sores, postnasal drip, rhinorrhea, sinus pressure/congestion, sneezing, sore throat, trouble swallowing and voice change.    Eyes:  Negative for discharge, itching and visual disturbance.   Respiratory:  Negative for cough, chest tightness, shortness of breath and wheezing.    Cardiovascular:  Negative for chest pain, palpitations and leg swelling.   Gastrointestinal:  Negative for abdominal pain, blood in stool, constipation, diarrhea, nausea and vomiting.   Endocrine: Negative for cold intolerance and heat intolerance.   Genitourinary:  Negative for difficulty urinating, dysuria, flank pain, hematuria and urgency.   Musculoskeletal:  Negative for arthralgias, back pain, myalgias and neck pain.   Integumentary:  Negative for rash and wound.   Allergic/Immunologic: Negative for environmental allergies and food allergies.   Neurological:  Negative for dizziness, tremors, seizures, syncope, weakness and headaches.   Hematological:  Negative for adenopathy. Does not bruise/bleed easily.   Psychiatric/Behavioral:  Negative for confusion, sleep disturbance and suicidal ideas. The patient is not nervous/anxious.           Objective     Physical Exam  Constitutional:       General: He is not in acute distress.     Appearance: Normal appearance. He is well-developed. He is not ill-appearing, toxic-appearing or diaphoretic.   HENT:      Head: Normocephalic and atraumatic.      Right Ear: External ear normal.      Left Ear: External ear normal.      Nose: Nose normal.      Mouth/Throat:      Pharynx: No oropharyngeal exudate.   Eyes:      General: No scleral icterus.        Right eye: No  discharge.         Left eye: No discharge.      Extraocular Movements: Extraocular movements intact.      Conjunctiva/sclera: Conjunctivae normal.      Pupils: Pupils are equal, round, and reactive to light.   Neck:      Thyroid: No thyromegaly.      Vascular: No JVD.   Cardiovascular:      Rate and Rhythm: Normal rate and regular rhythm.      Pulses: Normal pulses.      Heart sounds: Normal heart sounds. No murmur heard.  Pulmonary:      Effort: Pulmonary effort is normal. No respiratory distress.      Breath sounds: Normal breath sounds. No wheezing or rales.   Abdominal:      General: Bowel sounds are normal. There is no distension.      Palpations: Abdomen is soft.      Tenderness: There is no abdominal tenderness. There is no right CVA tenderness, left CVA tenderness, guarding or rebound.   Musculoskeletal:        Hands:       Cervical back: Normal range of motion and neck supple. No rigidity.      Right lower leg: No edema.      Left lower leg: No edema.      Comments: Tender areas, no swelling     Lymphadenopathy:      Cervical: No cervical adenopathy.   Skin:     General: Skin is warm and dry.      Capillary Refill: Capillary refill takes less than 2 seconds.      Coloration: Skin is not pale.      Findings: No rash.   Neurological:      General: No focal deficit present.      Mental Status: He is alert and oriented to person, place, and time. Mental status is at baseline.      Cranial Nerves: No cranial nerve deficit.      Sensory: No sensory deficit.      Motor: No weakness.      Coordination: Coordination normal.      Gait: Gait normal.      Deep Tendon Reflexes: Reflexes normal.   Psychiatric:         Mood and Affect: Mood normal.         Behavior: Behavior normal.         Thought Content: Thought content normal.         Judgment: Judgment normal.            Assessment and Plan     1. Annual physical exam    2. Finger pain, right  -     Ambulatory referral/consult to Orthopedics; Future; Expected date:  06/02/2025  -     X-Ray Finger 2 or More Views Right; Future; Expected date: 05/26/2025    3. History of nephrolithiasis        Blood work reviewed with pt    Hx of nephrolithiasis- stable    Chronic right index finger pain- referral to ortho    F/u in 1 yr

## 2025-06-09 ENCOUNTER — OFFICE VISIT (OUTPATIENT)
Dept: UROLOGY | Facility: CLINIC | Age: 50
End: 2025-06-09
Payer: COMMERCIAL

## 2025-06-09 VITALS
RESPIRATION RATE: 18 BRPM | HEIGHT: 73 IN | DIASTOLIC BLOOD PRESSURE: 73 MMHG | WEIGHT: 221.31 LBS | SYSTOLIC BLOOD PRESSURE: 117 MMHG | BODY MASS INDEX: 29.33 KG/M2 | HEART RATE: 76 BPM

## 2025-06-09 DIAGNOSIS — N20.0 NEPHROLITHIASIS: Primary | ICD-10-CM

## 2025-06-09 LAB
BILIRUB SERPL-MCNC: NORMAL MG/DL
BLOOD URINE, POC: NORMAL
CLARITY, POC UA: CLEAR
COLOR, POC UA: YELLOW
GLUCOSE UR QL STRIP: NORMAL
KETONES UR QL STRIP: NORMAL
LEUKOCYTE ESTERASE URINE, POC: NORMAL
NITRITE, POC UA: NORMAL
PH, POC UA: 5
PROTEIN, POC: NORMAL
SPECIFIC GRAVITY, POC UA: 1.01
UROBILINOGEN, POC UA: NORMAL

## 2025-06-09 PROCEDURE — 3044F HG A1C LEVEL LT 7.0%: CPT | Mod: CPTII,S$GLB,, | Performed by: NURSE PRACTITIONER

## 2025-06-09 PROCEDURE — 3074F SYST BP LT 130 MM HG: CPT | Mod: CPTII,S$GLB,, | Performed by: NURSE PRACTITIONER

## 2025-06-09 PROCEDURE — 99214 OFFICE O/P EST MOD 30 MIN: CPT | Mod: S$GLB,,, | Performed by: NURSE PRACTITIONER

## 2025-06-09 PROCEDURE — 3078F DIAST BP <80 MM HG: CPT | Mod: CPTII,S$GLB,, | Performed by: NURSE PRACTITIONER

## 2025-06-09 PROCEDURE — 81002 URINALYSIS NONAUTO W/O SCOPE: CPT | Mod: S$GLB,,, | Performed by: NURSE PRACTITIONER

## 2025-06-09 PROCEDURE — 3008F BODY MASS INDEX DOCD: CPT | Mod: CPTII,S$GLB,, | Performed by: NURSE PRACTITIONER

## 2025-06-09 PROCEDURE — 1159F MED LIST DOCD IN RCRD: CPT | Mod: CPTII,S$GLB,, | Performed by: NURSE PRACTITIONER

## 2025-06-09 PROCEDURE — 1160F RVW MEDS BY RX/DR IN RCRD: CPT | Mod: CPTII,S$GLB,, | Performed by: NURSE PRACTITIONER

## 2025-06-09 NOTE — PROGRESS NOTES
Subjective:      Hardik Jimenez is a 49 y.o. male who returns today regarding his nephrolithiasis.     The patient is s/p L URS with LL and stent placement with Dr. Valencia on 2/23/24. Stent removed in clinic.    Returns today with KUB.     Doing great. Reports occasional R flank discomfort. Denies dysuria, frequency, urgency, gross hematuria and fever/chills.   Drinking more water/lemonade.      Component      Latest Ref Rng 5/23/2025   Prostate Specific Antigen      <=4.00 ng/mL 1.03      The following portions of the patient's history were reviewed and updated as appropriate: allergies, current medications, past family history, past medical history, past social history, past surgical history and problem list.    Review of Systems  Constitutional: no fever or chills  ENT: no nasal congestion or sore throat  Respiratory: no cough or shortness of breath  Cardiovascular: no chest pain or palpitations  Gastrointestinal: no nausea or vomiting, tolerating diet  Genitourinary: as per HPI  Hematologic/Lymphatic: no easy bruising or lymphadenopathy  Musculoskeletal: no arthralgias or myalgias  Neurological: no seizures or tremors  Behavioral/Psych: no auditory or visual hallucinations     Objective:   Vitals:   Vitals:    06/09/25 1107   BP: 117/73   Pulse: 76   Resp: 18     Physical Exam   General: alert and oriented, no acute distress  Head: normocephalic, atraumatic  Neck: supple, normal ROM  Respiratory: Symmetric expansion, non-labored breathing  Cardiovascular: regular rate and rhythm  Abdomen: soft, non tender, non distended  Genitourinary: deferred  Skin: normal coloration and turgor, no rashes, no suspicious skin lesions noted  Neuro: alert and oriented x3, no gross deficits  Psych: normal judgment and insight, normal mood/affect, and non-anxious    Lab Review   Urinalysis demonstrates : negative for all components  Lab Results   Component Value Date    WBC 5.72 05/23/2025    HGB 14.3 05/23/2025    HGB 12.7 (L)  02/23/2024    HCT 41.4 05/23/2025    HCT 35.6 (L) 02/23/2024    MCV 84 05/23/2025    MCV 83 02/23/2024     05/23/2025     02/23/2024     Lab Results   Component Value Date    CREATININE 1.4 05/23/2025    BUN 14 05/23/2025     Lab Results   Component Value Date    PSA 1.03 05/23/2025     Imaging   (all images personally reviewed; agree with report below)  KUB- no stones   Assessment:     1. Nephrolithiasis      Plan:   Diagnoses and all orders for this visit:    Nephrolithiasis  -     X-Ray KUB; Future      Plan:  --No new stones developing  --Recommend general stone preventive measures, to include increased hydration, low Na diet, and increased citrus intake  --Follow up in 2 years with KUB

## 2025-06-20 DIAGNOSIS — M79.644 FINGER PAIN, RIGHT: Primary | ICD-10-CM

## 2025-07-08 ENCOUNTER — TELEPHONE (OUTPATIENT)
Dept: ORTHOPEDICS | Facility: CLINIC | Age: 50
End: 2025-07-08
Payer: COMMERCIAL

## 2025-07-08 DIAGNOSIS — M79.641 RIGHT HAND PAIN: Primary | ICD-10-CM

## 2025-07-08 NOTE — TELEPHONE ENCOUNTER
Appointment and X-ray confirmed    Patient communication:    Notified patient to stop at [facility] Location - 1st Floor [address] [facility], Please park in [address] and use [place] elevators 30 minutes prior to your appointment time for X-ray. After your X-ray please proceed to 9th Floor [address] for Appointment on 7/9/25 with Dr. Segura for x-rays.     Made them aware that this is not a scheduled xray appointment and they might be running behind as they are considered a walk-in xray.    Verbalized the Following:  *Please arrive at your informed time above, if you are more than 15 Minutes late to your appointment with Dr. Segura we will have to reschedule your appointment. This will allow you to be seen in a timely manor and be conscious to other patients being seen that same day*             Clark Molina MS, OTC   Sports Medicine Assistant   [facility]  (P) [phone]  (F) [phone]

## 2025-07-08 NOTE — TELEPHONE ENCOUNTER
Left voicemail with the following information for their upcoming appointment:    Notified patient to stop at [facility] Location - 1st Floor [address] [facility], Please park in [address] and use [place] elevators 30 minutes prior to your appointment time for X-ray. After your X-ray please proceed to 9th Floor [address] for Appointment on 7/9/25 with Dr. Segura for x-rays.     Made them aware that this is not a scheduled xray appointment and they might be running behind as they are considered a walk-in xray.    Verbalized the Following:  *Please arrive at your informed time above, if you are more than 15 Minutes late to your appointment with Dr. Segura we will have to reschedule your appointment. This will allow you to be seen in a timely manor and be conscious to other patients being seen that same day*

## 2025-07-09 ENCOUNTER — HOSPITAL ENCOUNTER (OUTPATIENT)
Dept: RADIOLOGY | Facility: OTHER | Age: 50
Discharge: HOME | End: 2025-07-09
Attending: ORTHOPAEDIC SURGERY
Payer: COMMERCIAL

## 2025-07-09 ENCOUNTER — PATIENT MESSAGE (OUTPATIENT)
Dept: ORTHOPEDICS | Facility: CLINIC | Age: 50
End: 2025-07-09

## 2025-07-09 ENCOUNTER — OFFICE VISIT (OUTPATIENT)
Dept: ORTHOPEDICS | Facility: CLINIC | Age: 50
End: 2025-07-09
Payer: COMMERCIAL

## 2025-07-09 VITALS — BODY MASS INDEX: 29.33 KG/M2 | WEIGHT: 221.31 LBS | HEIGHT: 73 IN

## 2025-07-09 DIAGNOSIS — M79.641 CHRONIC PAIN OF RIGHT HAND: ICD-10-CM

## 2025-07-09 DIAGNOSIS — M65.321 TRIGGER INDEX FINGER OF RIGHT HAND: Primary | ICD-10-CM

## 2025-07-09 DIAGNOSIS — M79.641 RIGHT HAND PAIN: ICD-10-CM

## 2025-07-09 DIAGNOSIS — G89.29 CHRONIC PAIN OF RIGHT HAND: ICD-10-CM

## 2025-07-09 DIAGNOSIS — M18.11 PRIMARY OSTEOARTHRITIS OF FIRST CARPOMETACARPAL JOINT OF RIGHT HAND: ICD-10-CM

## 2025-07-09 DIAGNOSIS — M79.644 FINGER PAIN, RIGHT: ICD-10-CM

## 2025-07-09 PROCEDURE — 20550 NJX 1 TENDON SHEATH/LIGAMENT: CPT | Mod: RT,S$GLB,, | Performed by: ORTHOPAEDIC SURGERY

## 2025-07-09 PROCEDURE — 73130 X-RAY EXAM OF HAND: CPT | Mod: 26,RT,, | Performed by: RADIOLOGY

## 2025-07-09 PROCEDURE — 73130 X-RAY EXAM OF HAND: CPT | Mod: TC,FY,RT

## 2025-07-09 PROCEDURE — 99999 PR PBB SHADOW E&M-EST. PATIENT-LVL III: CPT | Mod: PBBFAC,,, | Performed by: ORTHOPAEDIC SURGERY

## 2025-07-09 PROCEDURE — 3008F BODY MASS INDEX DOCD: CPT | Mod: CPTII,S$GLB,, | Performed by: ORTHOPAEDIC SURGERY

## 2025-07-09 PROCEDURE — 1159F MED LIST DOCD IN RCRD: CPT | Mod: CPTII,S$GLB,, | Performed by: ORTHOPAEDIC SURGERY

## 2025-07-09 PROCEDURE — 1160F RVW MEDS BY RX/DR IN RCRD: CPT | Mod: CPTII,S$GLB,, | Performed by: ORTHOPAEDIC SURGERY

## 2025-07-09 PROCEDURE — 3044F HG A1C LEVEL LT 7.0%: CPT | Mod: CPTII,S$GLB,, | Performed by: ORTHOPAEDIC SURGERY

## 2025-07-09 PROCEDURE — 99204 OFFICE O/P NEW MOD 45 MIN: CPT | Mod: 25,S$GLB,, | Performed by: ORTHOPAEDIC SURGERY

## 2025-07-09 RX ADMIN — METHYLPREDNISOLONE ACETATE 40 MG: 40 INJECTION, SUSPENSION INTRA-ARTICULAR; INTRALESIONAL; INTRAMUSCULAR; SOFT TISSUE at 10:07

## 2025-07-16 ENCOUNTER — OFFICE VISIT (OUTPATIENT)
Dept: SLEEP MEDICINE | Facility: CLINIC | Age: 50
End: 2025-07-16
Payer: COMMERCIAL

## 2025-07-16 VITALS
DIASTOLIC BLOOD PRESSURE: 65 MMHG | WEIGHT: 219.56 LBS | HEART RATE: 61 BPM | BODY MASS INDEX: 29.1 KG/M2 | SYSTOLIC BLOOD PRESSURE: 107 MMHG | HEIGHT: 73 IN

## 2025-07-16 DIAGNOSIS — R06.81 WITNESSED EPISODE OF APNEA: Primary | ICD-10-CM

## 2025-07-16 DIAGNOSIS — F51.09 OTHER INSOMNIA NOT DUE TO A SUBSTANCE OR KNOWN PHYSIOLOGICAL CONDITION: ICD-10-CM

## 2025-07-16 DIAGNOSIS — G89.29 CHRONIC NONINTRACTABLE HEADACHE, UNSPECIFIED HEADACHE TYPE: ICD-10-CM

## 2025-07-16 DIAGNOSIS — R51.9 FREQUENT HEADACHES: ICD-10-CM

## 2025-07-16 DIAGNOSIS — R51.9 CHRONIC NONINTRACTABLE HEADACHE, UNSPECIFIED HEADACHE TYPE: ICD-10-CM

## 2025-07-16 DIAGNOSIS — R06.83 SNORING: ICD-10-CM

## 2025-07-16 DIAGNOSIS — R51.9 MORNING HEADACHE: ICD-10-CM

## 2025-07-16 DIAGNOSIS — R40.0 DAYTIME SLEEPINESS: ICD-10-CM

## 2025-07-16 PROCEDURE — 3044F HG A1C LEVEL LT 7.0%: CPT | Mod: CPTII,S$GLB,, | Performed by: PHYSICIAN ASSISTANT

## 2025-07-16 PROCEDURE — 1159F MED LIST DOCD IN RCRD: CPT | Mod: CPTII,S$GLB,, | Performed by: PHYSICIAN ASSISTANT

## 2025-07-16 PROCEDURE — 3008F BODY MASS INDEX DOCD: CPT | Mod: CPTII,S$GLB,, | Performed by: PHYSICIAN ASSISTANT

## 2025-07-16 PROCEDURE — 3078F DIAST BP <80 MM HG: CPT | Mod: CPTII,S$GLB,, | Performed by: PHYSICIAN ASSISTANT

## 2025-07-16 PROCEDURE — 3074F SYST BP LT 130 MM HG: CPT | Mod: CPTII,S$GLB,, | Performed by: PHYSICIAN ASSISTANT

## 2025-07-16 PROCEDURE — 99999 PR PBB SHADOW E&M-EST. PATIENT-LVL III: CPT | Mod: PBBFAC,,, | Performed by: PHYSICIAN ASSISTANT

## 2025-07-16 PROCEDURE — 99204 OFFICE O/P NEW MOD 45 MIN: CPT | Mod: S$GLB,,, | Performed by: PHYSICIAN ASSISTANT

## 2025-07-16 PROCEDURE — 1160F RVW MEDS BY RX/DR IN RCRD: CPT | Mod: CPTII,S$GLB,, | Performed by: PHYSICIAN ASSISTANT

## 2025-07-16 NOTE — PROGRESS NOTES
"Referred by Kelvin Pablo, *     NEW PATIENT VISIT    Hardik Jimenez  is a pleasant 49 y.o. male  with PMH significant for headaches, hx of nephrolithiasis, DDD, BMI 29+ who presents for sleep evaluation following referral from PCP      C/o snoring, witnessed apneas in the past, poor disrupted and un-refreshing sleep, and daytime sleepiness and fatigue. Denies drowsiness when driving. Denies sleep walking, does endorse occasional talking. Denies dream enactment behaviors. Denies sx of RLS. States he has been having frequent night time headaches and headaches first thing in the morning. These headaches occasionally wake him from sleep, with head throbbing. States his PCP recommended evaluation for KOFI, which is why he presents today.    SLEEP SCHEDULE   Environment    Bed Time 11PM-MN   Sleep Latency 30mins   Arousals 1-2   Nocturia 1   Back to sleep 5mins   Wake time 10-11AM summer  7AM otherwise   Naps 2hrs+   Work        Past Medical History:   Diagnosis Date    Arthritis     Asthma     Kidney stones     Renal disorder     Kidney stones    Sciatica      Problem List[1]  Current Medications[2]       Vitals:    25 1322   Weight: 99.6 kg (219 lb 9.3 oz)   Height: 6' 1" (1.854 m)     Physical Exam:    GEN:   Well-appearing  Psych:  Appropriate affect, demonstrates insight  SKIN:  No rash on the face or bridge of the nose      LABS:   Lab Results   Component Value Date    HGB 14.3 2025    CO2 26 2025         RECORDS REVIEWED:    No previous sleep study    ASSESSMENT    [place] Sleepiness Scale:  Sitting and readin  Watching TV:    0  Passenger in a car x 1 hr:  1  Sitting quietly after lunch:  1  Lying down to rest in PM:  3  Sitting, inactive in public:  0  Sitting+ talking to someone:  0  Stopped in traffic:   0  Total        PROBLEM DESCRIPTION/ Sx on Presentation  STATUS   Sx KOFI   + snoring, + witnessed apneas  + wakes feeling un-refreshed  + wakes with headaches/nighttime " headaches  Denies known family hx of KOFI  New   Daytime Sx   + sleepiness when inactive   Naps 2hrs+ when able  ESS 5/24 on intake  New   Insomnia   Trouble falling asleep: 30mins  Arousals:         1-2  Hard to get back to sleep?: 5mins    Prior pertinent medications:  Current pertinent medications:   New   Nocturia   x 1 per sleep period  New   Other issues:       PLAN      -recommend sleep testing   -HST ordered  -discussed trial therapy if KOFI present and the patient is open to a trial of CPAP therapy  -discussed KOFI and PAP with patient in detail, including possible complications of untreated KOFI like heart attack/stroke  -advised on strict driving precautions; advised never to drive drowsy     Advised on plan of care. Answered all patient questions. Patient verbalized understanding and voiced agreement with plan of care.     RTC if dx of KOFI made and CPAP ordered, will need follow up 31-90 days after receiving machine for compliance       The patient was given open opportunity to ask questions and/or express concerns about treatment plan. All questions/concerns were discussed.     Two patient identifiers used prior to evaluation.                  [1]   Patient Active Problem List  Diagnosis    Acute right-sided thoracic back pain    Abdominal pain    Nausea and vomiting    Left nephrolithiasis    Dehydration, mild    Abnormal CT of the abdomen    Left testicular pain    Left ureteral calculus    History of nephrolithiasis   [2]   Current Outpatient Medications:     albuterol (PROVENTIL/VENTOLIN HFA) 90 mcg/actuation inhaler, Inhale 1-2 puffs into the lungs every 6 (six) hours as needed for Wheezing. Rescue (Patient not taking: Reported on 4/29/2025), Disp: 8 g, Rfl: 0    ondansetron (ZOFRAN-ODT) 4 MG TbDL, Take 2 tablets (8 mg total) by mouth every 6 (six) hours as needed (nausea). (Patient not taking: Reported on 4/29/2025), Disp: 20 tablet, Rfl: 0    oxybutynin (DITROPAN) 5 MG Tab, Take 1 tablet (5 mg total)  by mouth 3 (three) times daily as needed (bladder spasms). (Patient not taking: Reported on 4/29/2025), Disp: 30 tablet, Rfl: 0    oxyCODONE-acetaminophen (PERCOCET) 5-325 mg per tablet, Take 1 tablet by mouth every 8 (eight) hours as needed for Pain. (Patient not taking: Reported on 4/29/2025), Disp: 9 tablet, Rfl: 0    promethazine (PHENERGAN) 6.25 mg/5 mL syrup, Take 20 mLs (25 mg total) by mouth every 6 (six) hours as needed for Nausea. (Patient not taking: Reported on 7/16/2025), Disp: 473 mL, Rfl: 2    tamsulosin (FLOMAX) 0.4 mg Cap, Take 1 capsule (0.4 mg total) by mouth once daily. (Patient not taking: Reported on 4/29/2025), Disp: 30 capsule, Rfl: 1

## 2025-07-21 ENCOUNTER — TELEPHONE (OUTPATIENT)
Dept: SLEEP MEDICINE | Facility: OTHER | Age: 50
End: 2025-07-21
Payer: COMMERCIAL

## 2025-07-24 ENCOUNTER — LAB VISIT (OUTPATIENT)
Dept: LAB | Facility: HOSPITAL | Age: 50
End: 2025-07-24
Attending: INTERNAL MEDICINE
Payer: COMMERCIAL

## 2025-07-24 ENCOUNTER — OFFICE VISIT (OUTPATIENT)
Dept: INTERNAL MEDICINE | Facility: CLINIC | Age: 50
End: 2025-07-24
Payer: COMMERCIAL

## 2025-07-24 VITALS
HEIGHT: 73 IN | DIASTOLIC BLOOD PRESSURE: 72 MMHG | OXYGEN SATURATION: 98 % | HEART RATE: 67 BPM | TEMPERATURE: 98 F | SYSTOLIC BLOOD PRESSURE: 108 MMHG | WEIGHT: 214.75 LBS | BODY MASS INDEX: 28.46 KG/M2

## 2025-07-24 DIAGNOSIS — S33.5XXA SPRAIN OF LOW BACK, INITIAL ENCOUNTER: Primary | ICD-10-CM

## 2025-07-24 DIAGNOSIS — S33.5XXA SPRAIN OF LOW BACK, INITIAL ENCOUNTER: ICD-10-CM

## 2025-07-24 LAB
BILIRUB UR QL STRIP.AUTO: NEGATIVE
CLARITY UR: CLEAR
COLOR UR AUTO: YELLOW
GLUCOSE UR QL STRIP: NEGATIVE
HGB UR QL STRIP: NEGATIVE
KETONES UR QL STRIP: NEGATIVE
LEUKOCYTE ESTERASE UR QL STRIP: NEGATIVE
MICROSCOPIC COMMENT: NORMAL
NITRITE UR QL STRIP: NEGATIVE
PH UR STRIP: 6 [PH]
PROT UR QL STRIP: NEGATIVE
RBC #/AREA URNS AUTO: <1 /HPF (ref 0–4)
SP GR UR STRIP: 1.02
UROBILINOGEN UR STRIP-ACNC: NEGATIVE EU/DL
WBC #/AREA URNS AUTO: 1 /HPF (ref 0–5)

## 2025-07-24 PROCEDURE — 81003 URINALYSIS AUTO W/O SCOPE: CPT

## 2025-07-24 PROCEDURE — 99999 PR PBB SHADOW E&M-EST. PATIENT-LVL III: CPT | Mod: PBBFAC,,, | Performed by: INTERNAL MEDICINE

## 2025-07-24 RX ORDER — CYCLOBENZAPRINE HCL 10 MG
10 TABLET ORAL 3 TIMES DAILY PRN
Qty: 60 TABLET | Refills: 1 | Status: SHIPPED | OUTPATIENT
Start: 2025-07-24

## 2025-07-24 RX ORDER — KETOROLAC TROMETHAMINE 30 MG/ML
60 INJECTION, SOLUTION INTRAMUSCULAR; INTRAVENOUS
Status: COMPLETED | OUTPATIENT
Start: 2025-07-24 | End: 2025-07-24

## 2025-07-24 RX ORDER — METHYLPREDNISOLONE 4 MG/1
TABLET ORAL
Qty: 1 EACH | Refills: 0 | Status: SHIPPED | OUTPATIENT
Start: 2025-07-24

## 2025-07-24 RX ADMIN — KETOROLAC TROMETHAMINE 60 MG: 30 INJECTION, SOLUTION INTRAMUSCULAR; INTRAVENOUS at 11:07

## 2025-07-24 NOTE — PROGRESS NOTES
Subjective     Patient ID: Hardik Jimenez is a 49 y.o. male.    Chief Complaint: Headache and Follow-up (Been having for a week)    HPI  Review of Systems       Objective     Physical Exam       Assessment and Plan     {There are no diagnoses linked to this encounter. (Refresh or delete this SmartLink)}    ***         No follow-ups on file.

## 2025-07-25 NOTE — PROGRESS NOTES
"Patient ID: Hardik Jimenez is a 49 y.o. male.    Chief Complaint: Headache and Follow-up (Been having for a week)    History of Present Illness    CHIEF COMPLAINT:  Hardik presents today for back pain that started two days ago.    BACK PAIN:  He reports acute back pain that started after washing his truck on Sunday. Pain is described as sharp and tight with pressure when walking, causing balance issues, rated 8/10. Pain radiates to the right leg with occasional numbness. He attempted stretching which provided momentary relief without resolving the pain. The condition interferes with his mobility.    SLEEP:  He reports significant sleep disruption due to both back pain and right shoulder pain, experiencing difficulty finding comfortable positions. He attempts to sleep with his arm elevated to alleviate shoulder discomfort.    MUSCULOSKELETAL:  He reports progressive hand arthritis with stiffness, reduced mobility, and sensation of hands "freezing up". He also notes new onset right shoulder pain in the rotator cuff area, which was exacerbated on Sunday. He has no prior history of shoulder problems.    LIFESTYLE CHANGES:  He reports significantly increased water intake over the past month, consuming more water than in the entire previous year.    PENDING STUDIES:  He has been referred for a sleep study and is awaiting equipment and further instructions.         Physical Exam    General: No acute distress. Well-developed. Well-nourished.  Eyes: EOMI. Sclerae anicteric.  HENT: Normocephalic. Atraumatic. Nares patent. Moist oral mucosa.  Ears: Bilateral TMs clear. Bilateral EACs clear.  Cardiovascular: Regular rate. Regular rhythm. No murmurs. No rubs. No gallops. Normal S1, S2.  Respiratory: Normal respiratory effort. Clear to auscultation bilaterally. No rales. No rhonchi. No wheezing.  Abdomen: Soft. Non-tender. Non-distended. Normoactive bowel sounds.  Musculoskeletal: No  obvious deformity.  Extremities: No lower " extremity edema.  Neurological: Alert & oriented x3. No slurred speech. Normal gait.  Psychiatric: Normal mood. Normal affect. Good insight. Good judgment.  Skin: Warm. Dry. No rash.  MSK: Spine - Lumbar: Tenderness to palpation of right lower paraspinal lumbar muscles. No bony tenderness.         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  - Hardik reports sharp, tight pressure in the right leg affecting balance.  - Physical exam reveals tenderness to palpation of right lower paraspinal lumbar muscles, with no bony tenderness.  - Ordered urinalysis and urine microscopic exam to rule out kidney stones, blood, or signs of infection, though kidney stones deemed less likely based on pain presentation.  - Administered Toradol 60 mg IM injection for pain relief and prescribed Medrol Dosepak for inflammation.  - Advised patient to take it easy this weekend with no heavy lifting.  - Discussed that recovery could take several weeks.  - Hardik should avoid lifting heavy objects or making motions that could re-irritate the back.  - Walking is permitted as tolerated, but patient should avoid motions that caused initial injury.    ## MUSCLE SPASMS:  - Hardik experiences muscle spasms at night, causing stiffness and difficulty sleeping.  - Prescribed Flexeril 10 mg to be taken up to 3 times daily as needed for muscle spasms.  - Advised patient can take half a pill during the day if needed and not engaging in activities requiring alertness.    ## RIGHT SHOULDER PAIN:  - Hardik reports pain in the right  shoulder, worsened by certain sleeping positions and possibly related to rotator cuff issues.    ## OSTEOARTHRITIS OF THE HANDS:  - Hardik reports hand freezing up, possibly due to arthritis.  - Advised patient to stay physically active as the best management for arthritis symptoms.    ## SLEEP DISORDER:  - Hardik reports difficulty sleeping due to pain and muscle spasms.  - Prescribed Flexeril for muscle spasms as a sleep aid.            This note was generated with the assistance of ambient listening technology. Verbal consent was obtained by the patient and accompanying visitor(s) for the recording of patient appointment to facilitate this note. I attest to having reviewed and edited the generated note for accuracy, though some syntax or spelling errors may persist. Please contact the author of this note for any clarification.

## 2025-08-10 RX ORDER — METHYLPREDNISOLONE ACETATE 40 MG/ML
40 INJECTION, SUSPENSION INTRA-ARTICULAR; INTRALESIONAL; INTRAMUSCULAR; SOFT TISSUE
Status: DISCONTINUED | OUTPATIENT
Start: 2025-07-09 | End: 2025-08-10 | Stop reason: HOSPADM

## (undated) DEVICE — GLOVE SENSICARE PI SURG 6.5

## (undated) DEVICE — Device

## (undated) DEVICE — DRESSING TRANS 2X2 TEGADERM

## (undated) DEVICE — TRAY DRY SKIN SCRUB PREP

## (undated) DEVICE — WIRE GUIDE 0.038OLD

## (undated) DEVICE — GLOVE SENSICARE PI ORTHO 7.0

## (undated) DEVICE — SOL IRR SOD CHL .9% POUR

## (undated) DEVICE — EVACUATOR BLADDER UROVAC ADPT

## (undated) DEVICE — SOL POVIDONE SCRUB IODINE 4 OZ

## (undated) DEVICE — SEAL UROLOGY

## (undated) DEVICE — CATH URTRL OPEN END STR TIP 5F

## (undated) DEVICE — SOL POVIDONE PREP IODINE 4 OZ

## (undated) DEVICE — GUIDE WIRE MOTION .035 X 150CM

## (undated) DEVICE — SET Y-TYPE TUR IRRIGATION 96IN

## (undated) DEVICE — FIBER QUANTA OPT SDT 272UM

## (undated) DEVICE — CATH IV CATHLON W/HUB14GAX